# Patient Record
Sex: FEMALE | Race: WHITE | NOT HISPANIC OR LATINO | URBAN - METROPOLITAN AREA
[De-identification: names, ages, dates, MRNs, and addresses within clinical notes are randomized per-mention and may not be internally consistent; named-entity substitution may affect disease eponyms.]

---

## 2018-10-03 PROBLEM — Z00.00 ENCOUNTER FOR PREVENTIVE HEALTH EXAMINATION: Status: ACTIVE | Noted: 2018-10-03

## 2019-07-01 ENCOUNTER — OUTPATIENT (OUTPATIENT)
Dept: OUTPATIENT SERVICES | Facility: HOSPITAL | Age: 79
LOS: 1 days | End: 2019-07-01

## 2020-07-01 ENCOUNTER — RESULT REVIEW (OUTPATIENT)
Age: 80
End: 2020-07-01

## 2020-07-01 ENCOUNTER — APPOINTMENT (OUTPATIENT)
Dept: ULTRASOUND IMAGING | Facility: CLINIC | Age: 80
End: 2020-07-01
Payer: MEDICARE

## 2020-07-01 PROCEDURE — 76700 US EXAM ABDOM COMPLETE: CPT

## 2020-07-23 ENCOUNTER — OUTPATIENT (OUTPATIENT)
Dept: OUTPATIENT SERVICES | Facility: HOSPITAL | Age: 80
LOS: 1 days | End: 2020-07-23

## 2021-01-20 ENCOUNTER — APPOINTMENT (OUTPATIENT)
Dept: RADIOLOGY | Facility: CLINIC | Age: 81
End: 2021-01-20
Payer: MEDICARE

## 2021-01-20 PROCEDURE — 71046 X-RAY EXAM CHEST 2 VIEWS: CPT

## 2021-02-05 ENCOUNTER — APPOINTMENT (OUTPATIENT)
Dept: CT IMAGING | Facility: CLINIC | Age: 81
End: 2021-02-05
Payer: MEDICARE

## 2021-02-05 PROCEDURE — 71250 CT THORAX DX C-: CPT | Mod: MH

## 2021-05-07 ENCOUNTER — INPATIENT (INPATIENT)
Facility: HOSPITAL | Age: 81
LOS: 4 days | Discharge: HOME | End: 2021-05-12
Attending: INTERNAL MEDICINE | Admitting: STUDENT IN AN ORGANIZED HEALTH CARE EDUCATION/TRAINING PROGRAM
Payer: MEDICARE

## 2021-05-07 ENCOUNTER — EMERGENCY (EMERGENCY)
Facility: HOSPITAL | Age: 81
LOS: 1 days | End: 2021-05-07
Admitting: EMERGENCY MEDICINE
Payer: MEDICARE

## 2021-05-07 ENCOUNTER — OUTPATIENT (OUTPATIENT)
Dept: OUTPATIENT SERVICES | Facility: HOSPITAL | Age: 81
LOS: 1 days | End: 2021-05-07

## 2021-05-07 PROCEDURE — 71045 X-RAY EXAM CHEST 1 VIEW: CPT | Mod: 26

## 2021-05-07 PROCEDURE — 93010 ELECTROCARDIOGRAM REPORT: CPT

## 2021-05-07 PROCEDURE — 99285 EMERGENCY DEPT VISIT HI MDM: CPT | Mod: CS

## 2021-05-07 PROCEDURE — 99284 EMERGENCY DEPT VISIT MOD MDM: CPT | Mod: CS

## 2021-05-07 PROCEDURE — 74177 CT ABD & PELVIS W/CONTRAST: CPT | Mod: 26

## 2021-05-08 ENCOUNTER — OUTPATIENT (OUTPATIENT)
Dept: OUTPATIENT SERVICES | Facility: HOSPITAL | Age: 81
LOS: 1 days | End: 2021-05-08

## 2021-05-08 PROCEDURE — 93010 ELECTROCARDIOGRAM REPORT: CPT

## 2021-05-09 ENCOUNTER — OUTPATIENT (OUTPATIENT)
Dept: OUTPATIENT SERVICES | Facility: HOSPITAL | Age: 81
LOS: 1 days | End: 2021-05-09

## 2021-05-10 ENCOUNTER — OUTPATIENT (OUTPATIENT)
Dept: OUTPATIENT SERVICES | Facility: HOSPITAL | Age: 81
LOS: 1 days | End: 2021-05-10

## 2021-05-10 PROCEDURE — 71250 CT THORAX DX C-: CPT | Mod: 26

## 2021-05-11 ENCOUNTER — OUTPATIENT (OUTPATIENT)
Dept: OUTPATIENT SERVICES | Facility: HOSPITAL | Age: 81
LOS: 1 days | End: 2021-05-11

## 2021-05-11 PROCEDURE — 93970 EXTREMITY STUDY: CPT | Mod: 26

## 2021-05-11 PROCEDURE — 71275 CT ANGIOGRAPHY CHEST: CPT | Mod: 26

## 2021-05-12 ENCOUNTER — OUTPATIENT (OUTPATIENT)
Dept: OUTPATIENT SERVICES | Facility: HOSPITAL | Age: 81
LOS: 1 days | End: 2021-05-12

## 2021-05-13 ENCOUNTER — OUTPATIENT (OUTPATIENT)
Dept: OUTPATIENT SERVICES | Facility: HOSPITAL | Age: 81
LOS: 1 days | End: 2021-05-13

## 2021-05-13 ENCOUNTER — INPATIENT (INPATIENT)
Facility: HOSPITAL | Age: 81
LOS: 4 days | Discharge: HOME | End: 2021-05-18
Payer: MEDICARE

## 2021-05-13 PROCEDURE — 71045 X-RAY EXAM CHEST 1 VIEW: CPT | Mod: 26

## 2021-05-13 PROCEDURE — 99285 EMERGENCY DEPT VISIT HI MDM: CPT | Mod: CS

## 2021-05-13 PROCEDURE — 93010 ELECTROCARDIOGRAM REPORT: CPT

## 2021-05-13 PROCEDURE — 71275 CT ANGIOGRAPHY CHEST: CPT | Mod: 26

## 2021-05-14 ENCOUNTER — OUTPATIENT (OUTPATIENT)
Dept: OUTPATIENT SERVICES | Facility: HOSPITAL | Age: 81
LOS: 1 days | End: 2021-05-14

## 2021-05-15 ENCOUNTER — OUTPATIENT (OUTPATIENT)
Dept: OUTPATIENT SERVICES | Facility: HOSPITAL | Age: 81
LOS: 1 days | End: 2021-05-15

## 2021-05-15 PROCEDURE — 71045 X-RAY EXAM CHEST 1 VIEW: CPT | Mod: 26

## 2021-05-16 ENCOUNTER — OUTPATIENT (OUTPATIENT)
Dept: OUTPATIENT SERVICES | Facility: HOSPITAL | Age: 81
LOS: 1 days | End: 2021-05-16

## 2021-05-17 ENCOUNTER — OUTPATIENT (OUTPATIENT)
Dept: OUTPATIENT SERVICES | Facility: HOSPITAL | Age: 81
LOS: 1 days | End: 2021-05-17

## 2021-05-17 PROCEDURE — 71046 X-RAY EXAM CHEST 2 VIEWS: CPT | Mod: 26

## 2021-05-18 ENCOUNTER — OUTPATIENT (OUTPATIENT)
Dept: OUTPATIENT SERVICES | Facility: HOSPITAL | Age: 81
LOS: 1 days | End: 2021-05-18

## 2021-08-10 ENCOUNTER — APPOINTMENT (OUTPATIENT)
Dept: MRI IMAGING | Facility: CLINIC | Age: 81
End: 2021-08-10
Payer: MEDICARE

## 2021-08-10 ENCOUNTER — APPOINTMENT (OUTPATIENT)
Dept: MRI IMAGING | Facility: CLINIC | Age: 81
End: 2021-08-10

## 2021-08-10 PROCEDURE — 70544 MR ANGIOGRAPHY HEAD W/O DYE: CPT | Mod: MH,59

## 2021-08-10 PROCEDURE — 70551 MRI BRAIN STEM W/O DYE: CPT | Mod: MH

## 2021-10-04 ENCOUNTER — EMERGENCY (EMERGENCY)
Facility: HOSPITAL | Age: 81
LOS: 1 days | End: 2021-10-04
Admitting: EMERGENCY MEDICINE
Payer: MEDICARE

## 2021-10-04 PROCEDURE — 99284 EMERGENCY DEPT VISIT MOD MDM: CPT | Mod: 25

## 2021-10-04 PROCEDURE — 99291 CRITICAL CARE FIRST HOUR: CPT | Mod: 25

## 2021-10-04 PROCEDURE — 30903 CONTROL OF NOSEBLEED: CPT

## 2021-10-14 ENCOUNTER — OUTPATIENT (OUTPATIENT)
Dept: OUTPATIENT SERVICES | Facility: HOSPITAL | Age: 81
LOS: 1 days | End: 2021-10-14

## 2021-10-15 ENCOUNTER — OUTPATIENT (OUTPATIENT)
Dept: OUTPATIENT SERVICES | Facility: HOSPITAL | Age: 81
LOS: 1 days | End: 2021-10-15

## 2021-10-19 ENCOUNTER — APPOINTMENT (OUTPATIENT)
Dept: RADIOLOGY | Facility: CLINIC | Age: 81
End: 2021-10-19
Payer: MEDICARE

## 2021-10-19 PROCEDURE — 71046 X-RAY EXAM CHEST 2 VIEWS: CPT

## 2022-03-03 ENCOUNTER — APPOINTMENT (OUTPATIENT)
Dept: CT IMAGING | Facility: CLINIC | Age: 82
End: 2022-03-03
Payer: MEDICARE

## 2022-03-03 ENCOUNTER — RESULT REVIEW (OUTPATIENT)
Age: 82
End: 2022-03-03

## 2022-03-03 PROCEDURE — 74176 CT ABD & PELVIS W/O CONTRAST: CPT | Mod: MH

## 2022-06-30 ENCOUNTER — APPOINTMENT (OUTPATIENT)
Dept: VASCULAR SURGERY | Facility: CLINIC | Age: 82
End: 2022-06-30

## 2022-06-30 VITALS
SYSTOLIC BLOOD PRESSURE: 131 MMHG | BODY MASS INDEX: 24.99 KG/M2 | TEMPERATURE: 96.8 F | DIASTOLIC BLOOD PRESSURE: 78 MMHG | HEART RATE: 75 BPM | HEIGHT: 65 IN | WEIGHT: 150 LBS

## 2022-06-30 PROCEDURE — 93926 LOWER EXTREMITY STUDY: CPT

## 2022-06-30 PROCEDURE — 99214 OFFICE O/P EST MOD 30 MIN: CPT

## 2022-06-30 RX ORDER — ATORVASTATIN CALCIUM 40 MG/1
40 TABLET, FILM COATED ORAL
Qty: 90 | Refills: 0 | Status: ACTIVE | COMMUNITY
Start: 2022-04-20

## 2022-06-30 RX ORDER — ANAGRELIDE HYDROCHLORIDE 0.5 MG/1
0.5 CAPSULE ORAL
Qty: 180 | Refills: 0 | Status: ACTIVE | COMMUNITY
Start: 2022-03-31

## 2022-06-30 RX ORDER — CLOPIDOGREL BISULFATE 75 MG/1
75 TABLET, FILM COATED ORAL
Qty: 90 | Refills: 0 | Status: ACTIVE | COMMUNITY
Start: 2022-03-07

## 2022-06-30 RX ORDER — METOPROLOL SUCCINATE 50 MG/1
50 TABLET, EXTENDED RELEASE ORAL
Qty: 90 | Refills: 0 | Status: ACTIVE | COMMUNITY
Start: 2022-01-28

## 2022-06-30 RX ORDER — HYDROCHLOROTHIAZIDE 12.5 MG/1
12.5 TABLET ORAL
Qty: 90 | Refills: 0 | Status: ACTIVE | COMMUNITY
Start: 2022-05-10

## 2022-06-30 NOTE — ASSESSMENT
[FreeTextEntry1] : 82 yo s/p right ilio-profunda with femo-above knee popliteal bypass\par \par doing well\par \par continue plavix\par \par RTC 6 month with arterial duplex

## 2022-06-30 NOTE — HISTORY OF PRESENT ILLNESS
[FreeTextEntry1] : pt is well known to me 82 yo female with PAD who underwent right ilio-profunda and jump femoro-above knee popliteal bypass in summer 2019. Her PMH is significant for COPD. She in on home O2 currently.\par \par Denies any leg pain, claudication. No rest pain.\par \par Doing well.\par \par

## 2022-06-30 NOTE — PHYSICAL EXAM
[2+] : left 2+ [Ankle Swelling (On Exam)] : not present [Varicose Veins Of Lower Extremities] : present [Varicose Veins Of The Right Leg] : of the right leg [] : not present [Abdomen Tenderness] : ~T ~M No abdominal tenderness [No Rash or Lesion] : No rash or lesion [Alert] : alert [Oriented to Person] : oriented to person [Oriented to Place] : oriented to place [de-identified] : breathing comfortably on room air  [de-identified] : AGUSTINR

## 2023-01-12 ENCOUNTER — APPOINTMENT (OUTPATIENT)
Dept: VASCULAR SURGERY | Facility: CLINIC | Age: 83
End: 2023-01-12
Payer: MEDICARE

## 2023-01-12 VITALS
SYSTOLIC BLOOD PRESSURE: 149 MMHG | WEIGHT: 150 LBS | HEART RATE: 56 BPM | BODY MASS INDEX: 24.99 KG/M2 | HEIGHT: 65 IN | DIASTOLIC BLOOD PRESSURE: 83 MMHG | TEMPERATURE: 98.2 F

## 2023-01-12 PROCEDURE — 99213 OFFICE O/P EST LOW 20 MIN: CPT

## 2023-01-12 PROCEDURE — 93926 LOWER EXTREMITY STUDY: CPT

## 2023-01-12 NOTE — DATA REVIEWED
[FreeTextEntry1] : Arterial duplex examination 1/12/2023 reveals that right iliac to femoral bypass is patent.  Right jump femoral to above-knee popliteal bypass is patent.  There is short segment occlusion in popliteal artery with single-vessel runoff through anterior tibialis.

## 2023-01-12 NOTE — HISTORY OF PRESENT ILLNESS
[FreeTextEntry1] : pt is well known to me 82 yo female with PAD who underwent right ilio-profunda and jump femoro-above knee popliteal bypass in summer 2018. Her PMH is significant for COPD. She in on home O2 currently.\par \par Denies any leg pain, claudication. No rest pain.\par \par Presented to for 6 months routine surveillance.  Denies any claudication.  Patient states that she has lost significant weight and underwent work-up.  This included CT and PET scan and was negative.\par \par

## 2023-01-12 NOTE — PHYSICAL EXAM
[0] : right 0 [2+] : left 2+ [Ankle Swelling (On Exam)] : not present [Varicose Veins Of Lower Extremities] : present [Varicose Veins Of The Right Leg] : of the right leg [] : not present [Abdomen Tenderness] : ~T ~M No abdominal tenderness [No Rash or Lesion] : No rash or lesion [Alert] : alert [Oriented to Person] : oriented to person [Oriented to Place] : oriented to place [de-identified] : Alert and oriented no acute distress [de-identified] : breathing comfortably on room air  [de-identified] : AGUSTINR

## 2023-01-12 NOTE — ASSESSMENT
[FreeTextEntry1] : 80 yo s/p right ilio-profunda with femo-above knee popliteal bypass\par \par There is a short segment occlusion in popliteal artery that developed over the last 6-month.  Question etiology embolic versus thrombotic.\par \par Given that the femoral to popliteal bypass has decreased velocities to 45 I think it is reasonable to perform right lower extremity angiogram to revascularize right popliteal artery.  We will perform this in Cath Lab with minimal sedation given that the patient has significant COPD.\par \par Plan\par \par Right lower extremity angiogram possible pedal approach for revascularization of right popliteal artery

## 2023-01-24 ENCOUNTER — APPOINTMENT (OUTPATIENT)
Dept: CT IMAGING | Facility: CLINIC | Age: 83
End: 2023-01-24
Payer: MEDICARE

## 2023-01-24 ENCOUNTER — RESULT REVIEW (OUTPATIENT)
Age: 83
End: 2023-01-24

## 2023-01-24 PROCEDURE — 75635 CT ANGIO ABDOMINAL ARTERIES: CPT | Mod: MH

## 2023-01-30 ENCOUNTER — APPOINTMENT (OUTPATIENT)
Dept: VASCULAR SURGERY | Facility: HOSPITAL | Age: 83
End: 2023-01-30

## 2023-03-08 ENCOUNTER — APPOINTMENT (OUTPATIENT)
Dept: OPHTHALMOLOGY | Facility: CLINIC | Age: 83
End: 2023-03-08
Payer: MEDICARE

## 2023-03-08 ENCOUNTER — NON-APPOINTMENT (OUTPATIENT)
Age: 83
End: 2023-03-08

## 2023-03-08 PROCEDURE — 92004 COMPRE OPH EXAM NEW PT 1/>: CPT

## 2023-03-08 PROCEDURE — 92134 CPTRZ OPH DX IMG PST SGM RTA: CPT

## 2023-04-06 ENCOUNTER — APPOINTMENT (OUTPATIENT)
Dept: VASCULAR SURGERY | Facility: CLINIC | Age: 83
End: 2023-04-06
Payer: MEDICARE

## 2023-04-06 VITALS
DIASTOLIC BLOOD PRESSURE: 79 MMHG | HEART RATE: 56 BPM | BODY MASS INDEX: 22.33 KG/M2 | HEIGHT: 65 IN | TEMPERATURE: 97.6 F | SYSTOLIC BLOOD PRESSURE: 161 MMHG | WEIGHT: 134 LBS

## 2023-04-06 PROCEDURE — 99213 OFFICE O/P EST LOW 20 MIN: CPT

## 2023-04-06 PROCEDURE — 93926 LOWER EXTREMITY STUDY: CPT

## 2023-04-07 ENCOUNTER — TRANSCRIPTION ENCOUNTER (OUTPATIENT)
Age: 83
End: 2023-04-07

## 2023-04-19 NOTE — HISTORY OF PRESENT ILLNESS
[FreeTextEntry1] : pt is well known to me 80 yo female with PAD who underwent right ilio-profunda and jump femoro-above knee popliteal bypass in summer 2018. Her PMH is significant for COPD. She in on home O2 currently.\par \par Denies any leg pain, claudication. No rest pain.\par \par Presented to for 6 months routine surveillance.  Denies any claudication.  Patient states that she has lost significant weight and underwent work-up.  This included CT and PET scan and was negative.\par \par \par 4/6/23: Patient presented today for follow-up.  She continues to lose weight.  She was found to have a lymph node in her mediastinum not amenable to biopsy due to proximity to aorta.  Denies any claudication.  Denies any pain at rest.\par

## 2023-04-19 NOTE — DATA REVIEWED
[FreeTextEntry1] : Arterial duplex examination 1/12/2023 reveals that right iliac to femoral bypass is patent.  Right jump femoral to above-knee popliteal bypass is patent.  There is short segment occlusion in popliteal artery with single-vessel runoff through anterior tibialis.\par \par 4/6/2023: Duplex examination today revealed that right iliac to femoral bypass patent.  Right femoral to popliteal bypass is patent with no velocities in 30s stable compared to last time.  There was single-vessel runoff via anterior tibialis

## 2023-04-19 NOTE — ASSESSMENT
[FreeTextEntry1] : 80 yo s/p right ilio-profunda with femo-above knee popliteal bypass\par \par There is a short segment occlusion in popliteal artery that developed over the last 6-month.  Question etiology embolic versus thrombotic.\par \par Patient was found not to be a candidate for surgical revascularization due to her complex medical history including recent weight loss, severe COPD.\par \par I discussed the case with the patient primary care.  Patient also has history of intracranial bleeding.  We will continue antiplatelet therapy and low-dose anticoagulation with Eliquis 2.5 twice daily.

## 2023-04-19 NOTE — PHYSICAL EXAM
[2+] : left 2+ [Varicose Veins Of Lower Extremities] : present [Varicose Veins Of The Right Leg] : of the right leg [No Rash or Lesion] : No rash or lesion [Alert] : alert [Oriented to Person] : oriented to person [Oriented to Place] : oriented to place [Ankle Swelling (On Exam)] : not present [] : not present [Abdomen Tenderness] : ~T ~M No abdominal tenderness [de-identified] : Alert and oriented no acute distress [de-identified] : breathing comfortably on room air  [de-identified] : AGUSTINR [FreeTextEntry1] : Bilateral feet bluish demarcation.  Right lower extremity varicosities.\par Diminished pedal pulses.  Doppler signals present in right DP and PT

## 2023-05-11 ENCOUNTER — APPOINTMENT (OUTPATIENT)
Dept: OPHTHALMOLOGY | Facility: CLINIC | Age: 83
End: 2023-05-11
Payer: MEDICARE

## 2023-05-11 ENCOUNTER — NON-APPOINTMENT (OUTPATIENT)
Age: 83
End: 2023-05-11

## 2023-05-11 PROCEDURE — 92083 EXTENDED VISUAL FIELD XM: CPT

## 2023-05-11 PROCEDURE — 92133 CPTRZD OPH DX IMG PST SGM ON: CPT

## 2023-09-07 ENCOUNTER — NON-APPOINTMENT (OUTPATIENT)
Age: 83
End: 2023-09-07

## 2023-09-07 ENCOUNTER — APPOINTMENT (OUTPATIENT)
Dept: OPHTHALMOLOGY | Facility: CLINIC | Age: 83
End: 2023-09-07
Payer: MEDICARE

## 2023-09-07 PROCEDURE — 76514 ECHO EXAM OF EYE THICKNESS: CPT

## 2023-09-07 PROCEDURE — 99213 OFFICE O/P EST LOW 20 MIN: CPT

## 2023-10-12 ENCOUNTER — APPOINTMENT (OUTPATIENT)
Dept: VASCULAR SURGERY | Facility: CLINIC | Age: 83
End: 2023-10-12
Payer: MEDICARE

## 2023-10-12 VITALS
TEMPERATURE: 98.2 F | SYSTOLIC BLOOD PRESSURE: 147 MMHG | WEIGHT: 235 LBS | DIASTOLIC BLOOD PRESSURE: 78 MMHG | HEIGHT: 65 IN | BODY MASS INDEX: 39.15 KG/M2 | HEART RATE: 60 BPM

## 2023-10-12 DIAGNOSIS — Z80.9 FAMILY HISTORY OF MALIGNANT NEOPLASM, UNSPECIFIED: ICD-10-CM

## 2023-10-12 DIAGNOSIS — Z78.9 OTHER SPECIFIED HEALTH STATUS: ICD-10-CM

## 2023-10-12 DIAGNOSIS — Z86.79 PERSONAL HISTORY OF OTHER DISEASES OF THE CIRCULATORY SYSTEM: ICD-10-CM

## 2023-10-12 PROCEDURE — 99214 OFFICE O/P EST MOD 30 MIN: CPT

## 2023-10-12 PROCEDURE — 93926 LOWER EXTREMITY STUDY: CPT

## 2023-10-12 RX ORDER — FUROSEMIDE 80 MG/1
TABLET ORAL
Refills: 0 | Status: ACTIVE | COMMUNITY

## 2023-11-30 ENCOUNTER — APPOINTMENT (OUTPATIENT)
Dept: RADIOLOGY | Facility: CLINIC | Age: 83
End: 2023-11-30
Payer: MEDICARE

## 2023-11-30 PROCEDURE — 71046 X-RAY EXAM CHEST 2 VIEWS: CPT

## 2024-01-18 ENCOUNTER — APPOINTMENT (OUTPATIENT)
Dept: VASCULAR SURGERY | Facility: CLINIC | Age: 84
End: 2024-01-18
Payer: MEDICARE

## 2024-01-18 VITALS
TEMPERATURE: 97.5 F | WEIGHT: 126 LBS | HEART RATE: 58 BPM | BODY MASS INDEX: 20.99 KG/M2 | DIASTOLIC BLOOD PRESSURE: 68 MMHG | HEIGHT: 65 IN | SYSTOLIC BLOOD PRESSURE: 110 MMHG

## 2024-01-18 PROCEDURE — 93926 LOWER EXTREMITY STUDY: CPT

## 2024-01-18 PROCEDURE — 99214 OFFICE O/P EST MOD 30 MIN: CPT

## 2024-01-23 NOTE — PHYSICAL EXAM
[2+] : left 2+ [Ankle Swelling (On Exam)] : not present [Varicose Veins Of Lower Extremities] : present [Varicose Veins Of The Right Leg] : of the right leg [] : not present [Abdomen Tenderness] : ~T ~M No abdominal tenderness [No Rash or Lesion] : No rash or lesion [Alert] : alert [Oriented to Person] : oriented to person [Oriented to Place] : oriented to place [de-identified] : Alert and oriented no acute distress [de-identified] : breathing comfortably on room air  [de-identified] : AGUSTINR [FreeTextEntry1] : Bilateral feet bluish demarcation.  Right lower extremity varicosities.\par  Diminished pedal pulses.  Doppler signals present in right DP and PT

## 2024-01-23 NOTE — HISTORY OF PRESENT ILLNESS
[FreeTextEntry1] : pt is well known to me 84 yo female with PAD who underwent right ilio-profunda and jump femoro-above knee popliteal bypass in summer 2018. Her PMH is significant for COPD. She in on home O2 currently.  Denies any leg pain, claudication. No rest pain.  Presented to for 6 months routine surveillance.  Denies any claudication.  Patient states that she has lost significant weight and underwent work-up.  This included CT and PET scan and was negative.   4/6/23: Patient presented today for follow-up.  She continues to lose weight.  She was found to have a lymph node in her mediastinum not amenable to biopsy due to proximity to aorta.  Denies any claudication.  Denies any pain at rest.  10/19/23: Patient presented for follow-up.  Doing well.  No recent change in health.  Denies claudication.  Complains of varicose vein on the right side with pain.  Denies any pain at rest.    1/18/24: Patient presented for follow-up.  Over [place] and new year she was hospitalized with respiratory issues and also was found to have A-fib and was started on anticoagulation.  No lower extremity complaint at this point.

## 2024-01-23 NOTE — DATA REVIEWED
[FreeTextEntry1] : Arterial duplex examination 1/12/2023 reveals that right iliac to femoral bypass is patent.  Right jump femoral to above-knee popliteal bypass is patent.  There is short segment occlusion in popliteal artery with single-vessel runoff through anterior tibialis.  4/6/2023: Duplex examination today revealed that right iliac to femoral bypass patent.  Right femoral to popliteal bypass is patent with no velocities in 30s stable compared to last time.  There was single-vessel runoff via anterior tibialis  1/18/2024 duplex examination is a stable with patent right femoropopliteal bypass and occluded popliteal artery.  The velocities and femoropopliteal bypass status is stable  7/12/23: Duplex exam right lower extremity showed similar resolved with right iliac to femoral bypass patent and right femoral to popliteal bypass patent with velocities in 30s.  Popliteal artery occluded.  There is a collateral that feed anterior tibialis the only runoff

## 2024-01-23 NOTE — ASSESSMENT
[FreeTextEntry1] : 82 yo s/p right ilio-profunda with femo-above knee popliteal bypass There is a short segment occlusion in popliteal artery Femoropopliteal bypass still patent  Patient is currently on Eliquis 2.5 mg twice daily  Will continue follow-up routinely in 3 months

## 2024-03-07 ENCOUNTER — APPOINTMENT (OUTPATIENT)
Dept: OPHTHALMOLOGY | Facility: CLINIC | Age: 84
End: 2024-03-07
Payer: MEDICARE

## 2024-03-07 ENCOUNTER — NON-APPOINTMENT (OUTPATIENT)
Age: 84
End: 2024-03-07

## 2024-03-07 PROCEDURE — 92134 CPTRZ OPH DX IMG PST SGM RTA: CPT

## 2024-03-07 PROCEDURE — 92014 COMPRE OPH EXAM EST PT 1/>: CPT

## 2024-04-18 ENCOUNTER — APPOINTMENT (OUTPATIENT)
Dept: VASCULAR SURGERY | Facility: CLINIC | Age: 84
End: 2024-04-18
Payer: MEDICARE

## 2024-04-18 VITALS
SYSTOLIC BLOOD PRESSURE: 130 MMHG | WEIGHT: 126 LBS | DIASTOLIC BLOOD PRESSURE: 78 MMHG | HEART RATE: 57 BPM | BODY MASS INDEX: 20.99 KG/M2 | TEMPERATURE: 97.5 F | HEIGHT: 65 IN

## 2024-04-18 DIAGNOSIS — I77.9 DISORDER OF ARTERIES AND ARTERIOLES, UNSPECIFIED: ICD-10-CM

## 2024-04-18 DIAGNOSIS — I87.2 VENOUS INSUFFICIENCY (CHRONIC) (PERIPHERAL): ICD-10-CM

## 2024-04-18 DIAGNOSIS — Z95.828 PRESENCE OF OTHER VASCULAR IMPLANTS AND GRAFTS: ICD-10-CM

## 2024-04-18 PROCEDURE — 99213 OFFICE O/P EST LOW 20 MIN: CPT

## 2024-04-18 PROCEDURE — 93926 LOWER EXTREMITY STUDY: CPT

## 2024-04-18 NOTE — ASSESSMENT
[FreeTextEntry1] : 84 yo s/p right ilio-profunda with femo-above knee popliteal bypass There is a short segment occlusion in popliteal artery Femoropopliteal bypass still patent  Patient is currently on Eliquis 2.5 mg twice daily  Will continue follow-up routinely in 3 months  Patient right lower extremity varicose veins and hyperpigmentation worsening.  Will perform initial duplex examination next visit and potentially stab phlebectomy in future

## 2024-04-18 NOTE — PHYSICAL EXAM
[2+] : left 2+ [Ankle Swelling (On Exam)] : not present [Varicose Veins Of Lower Extremities] : bilaterally [] : not present [Abdomen Tenderness] : ~T ~M No abdominal tenderness [No Rash or Lesion] : No rash or lesion [Alert] : alert [Oriented to Person] : oriented to person [Oriented to Place] : oriented to place [de-identified] : Alert and oriented no acute distress [de-identified] : breathing comfortably on room air  [de-identified] : AGUSTINR [FreeTextEntry1] : Bilateral feet bluish demarcation.  Right lower extremity varicosities. Diminished pedal pulses.  Doppler signals present in right DP and PT  Bilateral lower extremity varicosities present no venous ulcer.

## 2024-04-18 NOTE — HISTORY OF PRESENT ILLNESS
[FreeTextEntry1] : pt is well known to me 84 yo female with PAD who underwent right ilio-profunda and jump femoro-above knee popliteal bypass in summer 2018. Her PMH is significant for COPD. She in on home O2 currently.  Denies any leg pain, claudication. No rest pain.  Presented to for 6 months routine surveillance.  Denies any claudication.  Patient states that she has lost significant weight and underwent work-up.  This included CT and PET scan and was negative.   4/6/23: Patient presented today for follow-up.  She continues to lose weight.  She was found to have a lymph node in her mediastinum not amenable to biopsy due to proximity to aorta.  Denies any claudication.  Denies any pain at rest.  10/19/23: Patient presented for follow-up.  Doing well.  No recent change in health.  Denies claudication.  Complains of varicose vein on the right side with pain.  Denies any pain at rest.    1/18/24: Patient presented for follow-up.  Over [place] and new year she was hospitalized with respiratory issues and also was found to have A-fib and was started on anticoagulation.  No lower extremity complaint at this point.    4/18/24: Patient present for follow-up.  Doing very well.  No recent hospitalization.  No claudication.

## 2024-05-16 ENCOUNTER — INPATIENT (INPATIENT)
Facility: HOSPITAL | Age: 84
LOS: 11 days | Discharge: NURSING, REHAB OR LONG-TERM CARE | DRG: 303 | End: 2024-05-28
Attending: SURGERY | Admitting: SURGERY
Payer: MEDICARE

## 2024-05-16 VITALS
RESPIRATION RATE: 20 BRPM | DIASTOLIC BLOOD PRESSURE: 90 MMHG | HEART RATE: 66 BPM | OXYGEN SATURATION: 92 % | TEMPERATURE: 98 F | SYSTOLIC BLOOD PRESSURE: 169 MMHG

## 2024-05-16 DIAGNOSIS — I99.8 OTHER DISORDER OF CIRCULATORY SYSTEM: ICD-10-CM

## 2024-05-16 LAB
ALBUMIN SERPL ELPH-MCNC: 3.5 G/DL — SIGNIFICANT CHANGE UP (ref 3.3–5)
ALP SERPL-CCNC: 93 U/L — SIGNIFICANT CHANGE UP (ref 40–120)
ALT FLD-CCNC: 9 U/L — LOW (ref 10–45)
ANION GAP SERPL CALC-SCNC: 14 MMOL/L — SIGNIFICANT CHANGE UP (ref 5–17)
APTT BLD: 32.5 SEC — SIGNIFICANT CHANGE UP (ref 24.5–35.6)
AST SERPL-CCNC: 14 U/L — SIGNIFICANT CHANGE UP (ref 10–40)
BASOPHILS # BLD AUTO: 0.06 K/UL — SIGNIFICANT CHANGE UP (ref 0–0.2)
BASOPHILS NFR BLD AUTO: 0.6 % — SIGNIFICANT CHANGE UP (ref 0–2)
BILIRUB SERPL-MCNC: 0.7 MG/DL — SIGNIFICANT CHANGE UP (ref 0.2–1.2)
BLD GP AB SCN SERPL QL: NEGATIVE — SIGNIFICANT CHANGE UP
BUN SERPL-MCNC: 32 MG/DL — HIGH (ref 7–23)
CALCIUM SERPL-MCNC: 8.8 MG/DL — SIGNIFICANT CHANGE UP (ref 8.4–10.5)
CHLORIDE SERPL-SCNC: 104 MMOL/L — SIGNIFICANT CHANGE UP (ref 96–108)
CO2 SERPL-SCNC: 22 MMOL/L — SIGNIFICANT CHANGE UP (ref 22–31)
CREAT SERPL-MCNC: 1.34 MG/DL — HIGH (ref 0.5–1.3)
EGFR: 40 ML/MIN/1.73M2 — LOW
EOSINOPHIL # BLD AUTO: 0.12 K/UL — SIGNIFICANT CHANGE UP (ref 0–0.5)
EOSINOPHIL NFR BLD AUTO: 1.1 % — SIGNIFICANT CHANGE UP (ref 0–6)
GLUCOSE SERPL-MCNC: 97 MG/DL — SIGNIFICANT CHANGE UP (ref 70–99)
HCT VFR BLD CALC: 47.2 % — HIGH (ref 34.5–45)
HGB BLD-MCNC: 14.3 G/DL — SIGNIFICANT CHANGE UP (ref 11.5–15.5)
IMM GRANULOCYTES NFR BLD AUTO: 1.1 % — HIGH (ref 0–0.9)
INR BLD: 1.4 RATIO — HIGH (ref 0.85–1.18)
LYMPHOCYTES # BLD AUTO: 0.8 K/UL — LOW (ref 1–3.3)
LYMPHOCYTES # BLD AUTO: 7.6 % — LOW (ref 13–44)
MCHC RBC-ENTMCNC: 23.1 PG — LOW (ref 27–34)
MCHC RBC-ENTMCNC: 30.3 GM/DL — LOW (ref 32–36)
MCV RBC AUTO: 76.3 FL — LOW (ref 80–100)
MONOCYTES # BLD AUTO: 0.37 K/UL — SIGNIFICANT CHANGE UP (ref 0–0.9)
MONOCYTES NFR BLD AUTO: 3.5 % — SIGNIFICANT CHANGE UP (ref 2–14)
NEUTROPHILS # BLD AUTO: 9.11 K/UL — HIGH (ref 1.8–7.4)
NEUTROPHILS NFR BLD AUTO: 86.1 % — HIGH (ref 43–77)
NRBC # BLD: 0 /100 WBCS — SIGNIFICANT CHANGE UP (ref 0–0)
NT-PROBNP SERPL-SCNC: 1669 PG/ML — HIGH (ref 0–300)
PLATELET # BLD AUTO: 232 K/UL — SIGNIFICANT CHANGE UP (ref 150–400)
POTASSIUM SERPL-MCNC: 3.7 MMOL/L — SIGNIFICANT CHANGE UP (ref 3.5–5.3)
POTASSIUM SERPL-SCNC: 3.7 MMOL/L — SIGNIFICANT CHANGE UP (ref 3.5–5.3)
PROT SERPL-MCNC: 8.5 G/DL — HIGH (ref 6–8.3)
PROTHROM AB SERPL-ACNC: 14.6 SEC — HIGH (ref 9.5–13)
RBC # BLD: 6.19 M/UL — HIGH (ref 3.8–5.2)
RBC # FLD: 21.1 % — HIGH (ref 10.3–14.5)
RH IG SCN BLD-IMP: POSITIVE — SIGNIFICANT CHANGE UP
SODIUM SERPL-SCNC: 140 MMOL/L — SIGNIFICANT CHANGE UP (ref 135–145)
TROPONIN T, HIGH SENSITIVITY RESULT: 34 NG/L — SIGNIFICANT CHANGE UP (ref 0–51)
WBC # BLD: 10.58 K/UL — HIGH (ref 3.8–10.5)
WBC # FLD AUTO: 10.58 K/UL — HIGH (ref 3.8–10.5)

## 2024-05-16 PROCEDURE — 75635 CT ANGIO ABDOMINAL ARTERIES: CPT | Mod: 26,MC

## 2024-05-16 PROCEDURE — 99291 CRITICAL CARE FIRST HOUR: CPT | Mod: GC

## 2024-05-16 PROCEDURE — 99222 1ST HOSP IP/OBS MODERATE 55: CPT | Mod: FS

## 2024-05-16 RX ORDER — HYDROMORPHONE HYDROCHLORIDE 2 MG/ML
0.5 INJECTION INTRAMUSCULAR; INTRAVENOUS; SUBCUTANEOUS EVERY 4 HOURS
Refills: 0 | Status: DISCONTINUED | OUTPATIENT
Start: 2024-05-16 | End: 2024-05-18

## 2024-05-16 RX ORDER — TIOTROPIUM BROMIDE 18 UG/1
2 CAPSULE ORAL; RESPIRATORY (INHALATION) DAILY
Refills: 0 | Status: DISCONTINUED | OUTPATIENT
Start: 2024-05-16 | End: 2024-05-28

## 2024-05-16 RX ORDER — AMIODARONE HYDROCHLORIDE 400 MG/1
200 TABLET ORAL DAILY
Refills: 0 | Status: DISCONTINUED | OUTPATIENT
Start: 2024-05-16 | End: 2024-05-28

## 2024-05-16 RX ORDER — HEPARIN SODIUM 5000 [USP'U]/ML
5000 INJECTION INTRAVENOUS; SUBCUTANEOUS ONCE
Refills: 0 | Status: COMPLETED | OUTPATIENT
Start: 2024-05-16 | End: 2024-05-16

## 2024-05-16 RX ORDER — SODIUM CHLORIDE 9 MG/ML
1000 INJECTION, SOLUTION INTRAVENOUS
Refills: 0 | Status: DISCONTINUED | OUTPATIENT
Start: 2024-05-16 | End: 2024-05-18

## 2024-05-16 RX ORDER — ALBUTEROL 90 UG/1
1 AEROSOL, METERED ORAL
Refills: 0 | Status: DISCONTINUED | OUTPATIENT
Start: 2024-05-16 | End: 2024-05-18

## 2024-05-16 RX ORDER — ACETAMINOPHEN 500 MG
1000 TABLET ORAL EVERY 6 HOURS
Refills: 0 | Status: COMPLETED | OUTPATIENT
Start: 2024-05-16 | End: 2024-05-17

## 2024-05-16 RX ORDER — HEPARIN SODIUM 5000 [USP'U]/ML
5000 INJECTION INTRAVENOUS; SUBCUTANEOUS EVERY 6 HOURS
Refills: 0 | Status: DISCONTINUED | OUTPATIENT
Start: 2024-05-16 | End: 2024-05-18

## 2024-05-16 RX ORDER — CLOPIDOGREL BISULFATE 75 MG/1
75 TABLET, FILM COATED ORAL DAILY
Refills: 0 | Status: DISCONTINUED | OUTPATIENT
Start: 2024-05-16 | End: 2024-05-20

## 2024-05-16 RX ORDER — ANAGRELIDE HCL 0.5 MG
0.5 CAPSULE ORAL DAILY
Refills: 0 | Status: DISCONTINUED | OUTPATIENT
Start: 2024-05-16 | End: 2024-05-18

## 2024-05-16 RX ORDER — ATORVASTATIN CALCIUM 80 MG/1
40 TABLET, FILM COATED ORAL AT BEDTIME
Refills: 0 | Status: DISCONTINUED | OUTPATIENT
Start: 2024-05-16 | End: 2024-05-28

## 2024-05-16 RX ORDER — HYDROMORPHONE HYDROCHLORIDE 2 MG/ML
0.2 INJECTION INTRAMUSCULAR; INTRAVENOUS; SUBCUTANEOUS EVERY 4 HOURS
Refills: 0 | Status: DISCONTINUED | OUTPATIENT
Start: 2024-05-16 | End: 2024-05-18

## 2024-05-16 RX ORDER — AMLODIPINE BESYLATE 2.5 MG/1
5 TABLET ORAL DAILY
Refills: 0 | Status: DISCONTINUED | OUTPATIENT
Start: 2024-05-16 | End: 2024-05-28

## 2024-05-16 RX ORDER — HEPARIN SODIUM 5000 [USP'U]/ML
INJECTION INTRAVENOUS; SUBCUTANEOUS
Qty: 25000 | Refills: 0 | Status: DISCONTINUED | OUTPATIENT
Start: 2024-05-16 | End: 2024-05-18

## 2024-05-16 RX ORDER — HEPARIN SODIUM 5000 [USP'U]/ML
2500 INJECTION INTRAVENOUS; SUBCUTANEOUS EVERY 6 HOURS
Refills: 0 | Status: DISCONTINUED | OUTPATIENT
Start: 2024-05-16 | End: 2024-05-18

## 2024-05-16 RX ORDER — METOPROLOL TARTRATE 50 MG
25 TABLET ORAL DAILY
Refills: 0 | Status: DISCONTINUED | OUTPATIENT
Start: 2024-05-16 | End: 2024-05-28

## 2024-05-16 RX ADMIN — SODIUM CHLORIDE 75 MILLILITER(S): 9 INJECTION, SOLUTION INTRAVENOUS at 21:51

## 2024-05-16 RX ADMIN — HEPARIN SODIUM 1100 UNIT(S)/HR: 5000 INJECTION INTRAVENOUS; SUBCUTANEOUS at 19:33

## 2024-05-16 RX ADMIN — HEPARIN SODIUM 5000 UNIT(S): 5000 INJECTION INTRAVENOUS; SUBCUTANEOUS at 19:31

## 2024-05-16 NOTE — H&P ADULT - ATTENDING COMMENTS
82 yo female with complex PMH including severe PAD, COPD s/p right ileo-femoral and fem-pop in the past presented with thrombosis of right ileofemoral and fem pop graft  patient has severe rest pain and foot discoloration  after a long conversation with the patient and her daughter regarding risks and benefits of redo revascularization (ax-redo prufonda with jump bypass to the AT versus primary amputation) the patient and her family elected to undergo primary above knee amputation    all risks , benefits, and alternatives were discussed

## 2024-05-16 NOTE — H&P ADULT - HISTORY OF PRESENT ILLNESS
Patient is a 83y old  Female who presents with a chief complaint of R leg pain    HPI: 83F h/o CAD (1 stent placed), COPD, HTN, HLD, PAD s/p  right ilio-profunda and jump femoro-above knee popliteal bypass in summer 2018, brain aneurysm p/w 3d of RLE pain, numbness. States sxs started as b/l pain in soles of feet Monday night. Went to [facility] who dc'd with dx of plantar fasciitis. Today, R foot became numb and more painful. Also noted purple discoloration. Endorses rest pain, no wounds.         PAST MEDICAL & SURGICAL HISTORY:  CAD (coronary artery disease)      History of COPD      PVD (peripheral vascular disease)        FAMILY HISTORY:    [X] Family history not pertinent as reviewed with the patient and family    SOCIAL HISTORY:      ALLERGIES: No Known Allergies      ROS: 10-system review is otherwise negative except HPI above.      T(C): 36.9 (05-16-24 @ 17:20), Max: 36.9 (05-16-24 @ 17:20)  HR: 66 (05-16-24 @ 17:20) (66 - 66)  BP: 169/90 (05-16-24 @ 17:20) (169/90 - 169/90)  RR: 20 (05-16-24 @ 17:20) (20 - 20)  SpO2: 92% (05-16-24 @ 17:20) (92% - 92%)    PHYSICAL EXAM  GENERAL: NAD, lying in bed comfortably  CHEST: nonlabored, no increased WOB  ABDOMEN: Soft, Nontender, Nondistended.  EXTREMITIES: RLE discoloration. absent sensation, decreased motor. Rest pain. Nonpalpable pulses in fem, pop, PT, cooler than LLE.   NERVOUS SYSTEM:  Alert & Oriented X3      OBJECTIVE  No Known Allergies    I&O's Summary    I&O's Detail    LABS                        14.3   10.58 )-----------( 232      ( 16 May 2024 18:35 )             47.2     05-16    140  |  104  |  32<H>  ----------------------------<  97  3.7   |  22  |  1.34<H>    Ca    8.8      16 May 2024 18:35    TPro  8.5<H>  /  Alb  3.5  /  TBili  0.7  /  DBili  x   /  AST  14  /  ALT  9<L>  /  AlkPhos  93  05-16    PT/INR - ( 16 May 2024 18:35 )   PT: 14.6 sec;   INR: 1.40 ratio         PTT - ( 16 May 2024 18:35 )  PTT:32.5 sec  Urinalysis Basic - ( 16 May 2024 18:35 )    Color: x / Appearance: x / SG: x / pH: x  Gluc: 97 mg/dL / Ketone: x  / Bili: x / Urobili: x   Blood: x / Protein: x / Nitrite: x   Leuk Esterase: x / RBC: x / WBC x   Sq Epi: x / Non Sq Epi: x / Bacteria: x          IMAGING   Patient is a 83y old  Female who presents with a chief complaint of R leg pain    HPI: 83F h/o CAD (1 stent placed), COPD, HTN, HLD, PAD s/p  right ilio-profunda and jump femoro-above knee popliteal bypass in summer 2018, brain aneurysm p/w 3d of RLE pain, numbness. States sxs started as b/l pain in soles of feet Monday night. Went to [facility] who dc'd with dx of plantar fasciitis. Today, R foot became numb and more painful. Also noted purple discoloration. Endorses rest pain, no wounds.         PAST MEDICAL & SURGICAL HISTORY:  CAD (coronary artery disease)      History of COPD      PVD (peripheral vascular disease)        FAMILY HISTORY:    [X] Family history not pertinent as reviewed with the patient and family    SOCIAL HISTORY:      ALLERGIES: No Known Allergies      ROS: 10-system review is otherwise negative except HPI above.      T(C): 36.9 (05-16-24 @ 17:20), Max: 36.9 (05-16-24 @ 17:20)  HR: 66 (05-16-24 @ 17:20) (66 - 66)  BP: 169/90 (05-16-24 @ 17:20) (169/90 - 169/90)  RR: 20 (05-16-24 @ 17:20) (20 - 20)  SpO2: 92% (05-16-24 @ 17:20) (92% - 92%)    PHYSICAL EXAM  GENERAL: NAD, lying in bed comfortably  CHEST: nonlabored, no increased WOB  ABDOMEN: Soft, Nontender, Nondistended.  EXTREMITIES: RLE discoloration, motor intact, decreased sensation. Dopplerable triphasic femoral signal and monophasic popliteal on RLE.   NERVOUS SYSTEM:  Alert & Oriented X3      OBJECTIVE  No Known Allergies    I&O's Summary    I&O's Detail    LABS                        14.3   10.58 )-----------( 232      ( 16 May 2024 18:35 )             47.2     05-16    140  |  104  |  32<H>  ----------------------------<  97  3.7   |  22  |  1.34<H>    Ca    8.8      16 May 2024 18:35    TPro  8.5<H>  /  Alb  3.5  /  TBili  0.7  /  DBili  x   /  AST  14  /  ALT  9<L>  /  AlkPhos  93  05-16    PT/INR - ( 16 May 2024 18:35 )   PT: 14.6 sec;   INR: 1.40 ratio         PTT - ( 16 May 2024 18:35 )  PTT:32.5 sec  Urinalysis Basic - ( 16 May 2024 18:35 )    Color: x / Appearance: x / SG: x / pH: x  Gluc: 97 mg/dL / Ketone: x  / Bili: x / Urobili: x   Blood: x / Protein: x / Nitrite: x   Leuk Esterase: x / RBC: x / WBC x   Sq Epi: x / Non Sq Epi: x / Bacteria: x          IMAGING

## 2024-05-16 NOTE — ED ADULT NURSE REASSESSMENT NOTE - NS ED NURSE REASSESS COMMENT FT1
Pt explained the function of the purewick device and risks/benefits and pt verbalized understanding and consented to application of purewick. ED RN cleaned pt prior to application, no skin breakdown noted prior to application.

## 2024-05-16 NOTE — ED PROVIDER NOTE - NSICDXPASTMEDICALHX_GEN_ALL_CORE_FT
PAST MEDICAL HISTORY:  CAD (coronary artery disease)     History of COPD     PVD (peripheral vascular disease)

## 2024-05-16 NOTE — ED ADULT NURSE REASSESSMENT NOTE - NS ED NURSE REASSESS COMMENT FT1
Report received from MEHNAZ Santos. Pt received A&Ox4, vitals retaken and documented. Heparin started at 11ml/hr, rate confirmed and flowing. Pt resting in bed denying any pain ot discomfort. Bed locked and in lowest position, side rails raised, call bell within reach. Currently pending CT results.

## 2024-05-16 NOTE — H&P ADULT - ASSESSMENT
ASSESSMENT: 82yo W pmhx of prior RLE bypass graft presenting with Zac 2 acute limb ischemia.     PLAN:   - Admit to Dr. Sarah  - Heparin gtt + bolus  - Pt to be added on, consented for OR for emergent revascularization and embolectomy  - pt to be consented  - pre-op labs  - NPO/IVF    Plan discussed with surgical attending, Dr. Sarah      Vascular Surgery [id number]  ASSESSMENT: 82yo W pmhx of prior RLE bypass graft presenting with Zac 2 acute limb ischemia.     PLAN:   - Admit to Dr. Sarah  - Heparin gtt + bolus  - Reg diet, NPO at midnight  - restart home meds    Pt seen and plan discussed with surgical attending, Dr. Sarah      Vascular Surgery [id number]

## 2024-05-16 NOTE — ED ADULT TRIAGE NOTE - BP NONINVASIVE DIASTOLIC (MM HG)
[FreeTextEntry1] : I DISCUSSED SURGERY-RESECTION OF FIBROMAS WITH FASCIECTOMY. \par ALL QUESTIONS WERE ASKED AND ANSWERED BY ME REGARDING OPERATION AND POST OP CARE.\par NO DRIVING FOR 1 WEEK TO TEN DAYS. \par NO SMOKE.\par 
90

## 2024-05-16 NOTE — ED ADULT TRIAGE NOTE - CHIEF COMPLAINT QUOTE
bypass surgery on right leg 5 years ago, numbness in the right foot and cold for a few days, [facility] yesterday and dc home dx with plantar fascitis, went to chiropractor today and started to have numbness in the right foot at 1pm, right foot toes are cold to touch in triage  on 2L NC at baseline on home bypass surgery on right leg 5 years ago, numbness in the right foot and cold for a few days, [facility] yesterday and dc home dx with plantar fascitis, went to chiropractor today and started to have numbness in the right foot at 1pm, right foot toes are cold to touch in triage and slightly discolored with decreased sensation mobile RN aware and to slot patient in main ER  on 2L NC at baseline on home bypass surgery on right leg 5 years ago, numbness in the right foot and cold. seen at [facility] yesterday for pain in the right foot and dc home dx with plantar fascitis, went to chiropractor today and started to have numbness in the right foot at 1pm, right foot toes are cold to touch in triage and slightly discolored with decreased sensation mobile RN aware and to slot patient in main ER  on 2L NC at baseline on home

## 2024-05-16 NOTE — ED ADULT NURSE NOTE - NSFALLUNIVINTERV_ED_ALL_ED
Bed/Stretcher in lowest position, wheels locked, appropriate side rails in place/Call bell, personal items and telephone in reach/Instruct patient to call for assistance before getting out of bed/chair/stretcher/Non-slip footwear applied when patient is off stretcher/[place] to call system/Physically safe environment - no spills, clutter or unnecessary equipment/Purposeful proactive rounding/Room/bathroom lighting operational, light cord in reach

## 2024-05-16 NOTE — ED ADULT NURSE NOTE - OBJECTIVE STATEMENT
82 y/o F with PMHx CAD X1 STENT, COPD,HDL,  HTN and PVD s/p graft RLE presenting to ED with c/o numbness, swelling and discoloration to RLE. Pt states on monday she started to have pain to bilateral LE however has noticed increased swelling and numbness, discoloration to RLE. Upon assessment pt has edema to bilateral LE more on the right LE also appears purple in color. pt is  A&Ox4 speaking coherently in full sentences. Pt is breathing unlabored and equal BL in no apparent distress. Denies HA, dizziness, blurry vision. Denies SOB, dyspnea, cough, CP, palpitations. Denies abd pain, N/V/D/C. Abd soft NT/ND. Denies urinary symptoms. Denies fever, chills. No sick contacts. Skin intact, warm, dry, normal for race.

## 2024-05-16 NOTE — ED PROVIDER NOTE - PHYSICAL EXAMINATION
PHYSICAL EXAM:  GENERAL: NAD, lying in bed comfortably  HEAD:  Atraumatic, Normocephalic  EYES: EOMI, conjunctiva and sclera clear  ENT: Moist mucous membranes  NECK: Supple  CHEST/LUNG: Clear to auscultation bilaterally; No rales, rhonchi, wheezing, or rubs. Unlabored respirations  HEART: Regular rate and rhythm; No murmurs, rubs, or gallops  ABDOMEN: Bowel sounds present; Soft, Nontender, Nondistended. No hepatomegally  EXTREMITIES:  RLE cold to touch, purplish discoloration, no DP pulse felt  NERVOUS SYSTEM:  Alert & Oriented X3, speech clear. No deficits   MSK: FROM all 4 extremities, strength of R foot limited 2/2 pain  SKIN: No rashes or lesions

## 2024-05-16 NOTE — ED PROVIDER NOTE - OBJECTIVE STATEMENT
83F h/o CAD (1 stent placed), COPD, HTN, HLD, PVD, s/p bypass graft in RLE, brain aneurysm p/w 3d of RLE pain, numbness. States sxs started as b/l pain in soles of feet Monday night. Went to [facility] who dc'd with dx of plantar fasciitis. Today, R foot became numb and more painful. Also noted purple discoloration. Prompted visit to ED.

## 2024-05-16 NOTE — ED ADULT NURSE NOTE - CHIEF COMPLAINT QUOTE
bypass surgery on right leg 5 years ago, numbness in the right foot and cold. seen at [facility] yesterday for pain in the right foot and dc home dx with plantar fascitis, went to chiropractor today and started to have numbness in the right foot at 1pm, right foot toes are cold to touch in triage and slightly discolored with decreased sensation mobile RN aware and to slot patient in main ER  on 2L NC at baseline on home

## 2024-05-16 NOTE — ED PROVIDER NOTE - ATTENDING CONTRIBUTION TO CARE
83yr F hx of prior cardiac stents COPD htn pvd w RLE graft p/w rt foot pain and discoloration. pt was seen in the ED prior 2 days and discharged however pt had worsening pain since 1pm. called her vascular surgeon and was brought in.  on exam rle appears purple and difficult to palpate pulse. discussed with Dr Sarah at bedside w plan to start heparin, CTA w run offs and likely admit for threatened limb.

## 2024-05-17 ENCOUNTER — RESULT REVIEW (OUTPATIENT)
Age: 84
End: 2024-05-17

## 2024-05-17 LAB
ANION GAP SERPL CALC-SCNC: 13 MMOL/L — SIGNIFICANT CHANGE UP (ref 5–17)
APTT BLD: 59.6 SEC — HIGH (ref 24.5–35.6)
APTT BLD: 77.9 SEC — HIGH (ref 24.5–35.6)
BASOPHILS # BLD AUTO: 0.07 K/UL — SIGNIFICANT CHANGE UP (ref 0–0.2)
BASOPHILS NFR BLD AUTO: 0.7 % — SIGNIFICANT CHANGE UP (ref 0–2)
BUN SERPL-MCNC: 24 MG/DL — HIGH (ref 7–23)
CALCIUM SERPL-MCNC: 8.3 MG/DL — LOW (ref 8.4–10.5)
CHLORIDE SERPL-SCNC: 103 MMOL/L — SIGNIFICANT CHANGE UP (ref 96–108)
CO2 SERPL-SCNC: 23 MMOL/L — SIGNIFICANT CHANGE UP (ref 22–31)
CREAT SERPL-MCNC: 1.07 MG/DL — SIGNIFICANT CHANGE UP (ref 0.5–1.3)
EGFR: 52 ML/MIN/1.73M2 — LOW
EOSINOPHIL # BLD AUTO: 0.11 K/UL — SIGNIFICANT CHANGE UP (ref 0–0.5)
EOSINOPHIL NFR BLD AUTO: 1.1 % — SIGNIFICANT CHANGE UP (ref 0–6)
GLUCOSE SERPL-MCNC: 89 MG/DL — SIGNIFICANT CHANGE UP (ref 70–99)
HCT VFR BLD CALC: 42.9 % — SIGNIFICANT CHANGE UP (ref 34.5–45)
HCT VFR BLD CALC: 44.8 % — SIGNIFICANT CHANGE UP (ref 34.5–45)
HGB BLD-MCNC: 13 G/DL — SIGNIFICANT CHANGE UP (ref 11.5–15.5)
HGB BLD-MCNC: 13.6 G/DL — SIGNIFICANT CHANGE UP (ref 11.5–15.5)
IMM GRANULOCYTES NFR BLD AUTO: 0.8 % — SIGNIFICANT CHANGE UP (ref 0–0.9)
LYMPHOCYTES # BLD AUTO: 0.83 K/UL — LOW (ref 1–3.3)
LYMPHOCYTES # BLD AUTO: 8.4 % — LOW (ref 13–44)
MAGNESIUM SERPL-MCNC: 1.8 MG/DL — SIGNIFICANT CHANGE UP (ref 1.6–2.6)
MCHC RBC-ENTMCNC: 23.1 PG — LOW (ref 27–34)
MCHC RBC-ENTMCNC: 23.2 PG — LOW (ref 27–34)
MCHC RBC-ENTMCNC: 30.3 GM/DL — LOW (ref 32–36)
MCHC RBC-ENTMCNC: 30.4 GM/DL — LOW (ref 32–36)
MCV RBC AUTO: 76.2 FL — LOW (ref 80–100)
MCV RBC AUTO: 76.5 FL — LOW (ref 80–100)
MONOCYTES # BLD AUTO: 0.36 K/UL — SIGNIFICANT CHANGE UP (ref 0–0.9)
MONOCYTES NFR BLD AUTO: 3.7 % — SIGNIFICANT CHANGE UP (ref 2–14)
NEUTROPHILS # BLD AUTO: 8.41 K/UL — HIGH (ref 1.8–7.4)
NEUTROPHILS NFR BLD AUTO: 85.3 % — HIGH (ref 43–77)
NRBC # BLD: 0 /100 WBCS — SIGNIFICANT CHANGE UP (ref 0–0)
NRBC # BLD: 0 /100 WBCS — SIGNIFICANT CHANGE UP (ref 0–0)
PHOSPHATE SERPL-MCNC: 3 MG/DL — SIGNIFICANT CHANGE UP (ref 2.5–4.5)
PLATELET # BLD AUTO: 207 K/UL — SIGNIFICANT CHANGE UP (ref 150–400)
PLATELET # BLD AUTO: 218 K/UL — SIGNIFICANT CHANGE UP (ref 150–400)
POTASSIUM SERPL-MCNC: 3.2 MMOL/L — LOW (ref 3.5–5.3)
POTASSIUM SERPL-SCNC: 3.2 MMOL/L — LOW (ref 3.5–5.3)
RBC # BLD: 5.61 M/UL — HIGH (ref 3.8–5.2)
RBC # BLD: 5.88 M/UL — HIGH (ref 3.8–5.2)
RBC # FLD: 20.4 % — HIGH (ref 10.3–14.5)
RBC # FLD: 20.7 % — HIGH (ref 10.3–14.5)
SODIUM SERPL-SCNC: 139 MMOL/L — SIGNIFICANT CHANGE UP (ref 135–145)
WBC # BLD: 9.25 K/UL — SIGNIFICANT CHANGE UP (ref 3.8–10.5)
WBC # BLD: 9.86 K/UL — SIGNIFICANT CHANGE UP (ref 3.8–10.5)
WBC # FLD AUTO: 9.25 K/UL — SIGNIFICANT CHANGE UP (ref 3.8–10.5)
WBC # FLD AUTO: 9.86 K/UL — SIGNIFICANT CHANGE UP (ref 3.8–10.5)

## 2024-05-17 PROCEDURE — 93306 TTE W/DOPPLER COMPLETE: CPT | Mod: 26

## 2024-05-17 PROCEDURE — 93356 MYOCRD STRAIN IMG SPCKL TRCK: CPT

## 2024-05-17 PROCEDURE — 93010 ELECTROCARDIOGRAM REPORT: CPT

## 2024-05-17 PROCEDURE — 99232 SBSQ HOSP IP/OBS MODERATE 35: CPT | Mod: FS

## 2024-05-17 RX ORDER — PANTOPRAZOLE SODIUM 20 MG/1
40 TABLET, DELAYED RELEASE ORAL
Refills: 0 | Status: DISCONTINUED | OUTPATIENT
Start: 2024-05-17 | End: 2024-05-28

## 2024-05-17 RX ADMIN — Medication 20 MILLIGRAM(S): at 05:14

## 2024-05-17 RX ADMIN — Medication 400 MILLIGRAM(S): at 17:32

## 2024-05-17 RX ADMIN — HYDROMORPHONE HYDROCHLORIDE 0.5 MILLIGRAM(S): 2 INJECTION INTRAMUSCULAR; INTRAVENOUS; SUBCUTANEOUS at 22:34

## 2024-05-17 RX ADMIN — Medication 25 MILLIGRAM(S): at 05:13

## 2024-05-17 RX ADMIN — HEPARIN SODIUM 1100 UNIT(S)/HR: 5000 INJECTION INTRAVENOUS; SUBCUTANEOUS at 14:51

## 2024-05-17 RX ADMIN — CLOPIDOGREL BISULFATE 75 MILLIGRAM(S): 75 TABLET, FILM COATED ORAL at 12:14

## 2024-05-17 RX ADMIN — TIOTROPIUM BROMIDE 2 PUFF(S): 18 CAPSULE ORAL; RESPIRATORY (INHALATION) at 12:13

## 2024-05-17 RX ADMIN — Medication 400 MILLIGRAM(S): at 05:13

## 2024-05-17 RX ADMIN — ATORVASTATIN CALCIUM 40 MILLIGRAM(S): 80 TABLET, FILM COATED ORAL at 21:50

## 2024-05-17 RX ADMIN — Medication 0.5 MILLIGRAM(S): at 17:53

## 2024-05-17 RX ADMIN — HYDROMORPHONE HYDROCHLORIDE 0.5 MILLIGRAM(S): 2 INJECTION INTRAMUSCULAR; INTRAVENOUS; SUBCUTANEOUS at 23:00

## 2024-05-17 RX ADMIN — Medication 400 MILLIGRAM(S): at 12:13

## 2024-05-17 RX ADMIN — HEPARIN SODIUM 1100 UNIT(S)/HR: 5000 INJECTION INTRAVENOUS; SUBCUTANEOUS at 07:51

## 2024-05-17 RX ADMIN — Medication 1000 MILLIGRAM(S): at 18:02

## 2024-05-17 RX ADMIN — Medication 1000 MILLIGRAM(S): at 05:43

## 2024-05-17 RX ADMIN — HEPARIN SODIUM 1100 UNIT(S)/HR: 5000 INJECTION INTRAVENOUS; SUBCUTANEOUS at 19:44

## 2024-05-17 RX ADMIN — AMLODIPINE BESYLATE 5 MILLIGRAM(S): 2.5 TABLET ORAL at 05:13

## 2024-05-17 RX ADMIN — Medication 1000 MILLIGRAM(S): at 12:43

## 2024-05-17 RX ADMIN — AMIODARONE HYDROCHLORIDE 200 MILLIGRAM(S): 400 TABLET ORAL at 05:13

## 2024-05-17 NOTE — CONSULT NOTE ADULT - SUBJECTIVE AND OBJECTIVE BOX
CHIEF COMPLAINT:Patient is a 83y old  Female who presents with a chief complaint of     HISTORY OF PRESENT ILLNESS:    83F h/o CAD (1 stent placed), COPD, HTN, HLD, PAF on amio and a/c , PAD s/p  right ilio-profunda and jump femoro-above knee popliteal bypass in summer 2018, brain aneurysm p/w 3d of RLE pain, numbness. States sxs started as b/l pain in soles of feet Monday night. Went to [facility] who dc'd with dx of plantar fasciitis. Today, R foot became numb and more painful. Also noted purple discoloration. Endorses rest pain, no wounds.   now planned for amputation   cardiology is called for Pre-Operative Cardiac Risk Stratification and Optimization   pt denies any cp , dizziness, syncope   has baseline MARIANO due to emphysema / copd on home O2       PAST MEDICAL & SURGICAL HISTORY:  CAD (coronary artery disease)      History of COPD      PVD (peripheral vascular disease)              MEDICATIONS:  aMIOdarone    Tablet 200 milliGRAM(s) Oral daily  amLODIPine   Tablet 5 milliGRAM(s) Oral daily  anagrelide 0.5 milliGRAM(s) Oral daily  clopidogrel Tablet 75 milliGRAM(s) Oral daily  heparin   Injectable 5000 Unit(s) IV Push every 6 hours PRN  heparin   Injectable 2500 Unit(s) IV Push every 6 hours PRN  heparin  Infusion.  Unit(s)/Hr IV Continuous <Continuous>  metoprolol succinate ER 25 milliGRAM(s) Oral daily  torsemide 20 milliGRAM(s) Oral daily      albuterol    90 MICROgram(s) HFA Inhaler 1 Puff(s) Inhalation two times a day PRN  tiotropium 2.5 MICROgram(s) Inhaler 2 Puff(s) Inhalation daily    acetaminophen   IVPB .. 1000 milliGRAM(s) IV Intermittent every 6 hours  HYDROmorphone  Injectable 0.2 milliGRAM(s) IV Push every 4 hours PRN  HYDROmorphone  Injectable 0.5 milliGRAM(s) IV Push every 4 hours PRN    pantoprazole    Tablet 40 milliGRAM(s) Oral before breakfast    atorvastatin 40 milliGRAM(s) Oral at bedtime    lactated ringers. 1000 milliLiter(s) IV Continuous <Continuous>      FAMILY HISTORY:      Non-contributory    SOCIAL HISTORY:    No tobacco, drugs or etoh    Allergies    No Known Allergies    Intolerances    	    REVIEW OF SYSTEMS:  as above  The rest of the 14 points ROS reviewed and except above they are unremarkable.        PHYSICAL EXAM:  T(C): 36.7 (05-17-24 @ 14:33), Max: 36.9 (05-16-24 @ 17:20)  HR: 59 (05-17-24 @ 11:01) (59 - 66)  BP: 134/72 (05-17-24 @ 14:33) (133/69 - 181/90)  RR: 18 (05-17-24 @ 14:33) (18 - 20)  SpO2: 92% (05-17-24 @ 14:33) (92% - 93%)  Wt(kg): --  I&O's Summary    16 May 2024 07:01  -  17 May 2024 07:00  --------------------------------------------------------  IN: 713 mL / OUT: 400 mL / NET: 313 mL    17 May 2024 07:01  -  17 May 2024 14:47  --------------------------------------------------------  IN: 465 mL / OUT: 600 mL / NET: -135 mL        JVP: Normal  Neck: supple  Lung: clear   CV: S1 S2 , Murmur:  Abd: soft  Ext: No edema  neuro: Awake / alert  Psych: flat affect        LABS/DATA:    TELEMETRY: 	    ECG:  	   	  CARDIAC MARKERS:                        34 <<== 05-16-24 @ 18:35                              13.6   9.86  )-----------( 207      ( 17 May 2024 07:08 )             44.8     05-17    139  |  103  |  24<H>  ----------------------------<  89  3.2<L>   |  23  |  1.07    Ca    8.3<L>      17 May 2024 07:08  Phos  3.0     05-17  Mg     1.8     05-17    TPro  8.5<H>  /  Alb  3.5  /  TBili  0.7  /  DBili  x   /  AST  14  /  ALT  9<L>  /  AlkPhos  93  05-16    proBNP:   Lipid Profile:   HgA1c:   TSH:

## 2024-05-17 NOTE — PATIENT PROFILE ADULT - FALL HARM RISK - HARM RISK INTERVENTIONS
Assistance with ambulation/Assistance OOB with selected safe patient handling equipment/Communicate Risk of Fall with Harm to all staff/Discuss with provider need for PT consult/Monitor gait and stability/Reinforce activity limits and safety measures with patient and family/Tailored Fall Risk Interventions/Visual Cue: Yellow wristband and red socks/Bed in lowest position, wheels locked, appropriate side rails in place/Call bell, personal items and telephone in reach/Instruct patient to call for assistance before getting out of bed or chair/Non-slip footwear when patient is out of bed/[place] to call system/Physically safe environment - no spills, clutter or unnecessary equipment/Purposeful Proactive Rounding/Room/bathroom lighting operational, light cord in reach

## 2024-05-17 NOTE — PROGRESS NOTE ADULT - ATTENDING COMMENTS
84 yo female with complex PMH including severe PAD, COPD s/p right ileo-femoral and fem-pop in the past presented with thrombosis of right ileofemoral and fem pop graft  patient has severe rest pain and foot discoloration  after a long conversation with the patient and her daughter regarding risks and benefits of redo revascularization (ax-redo prufonda with jump bypass to the AT versus primary amputation) the patient and her family elected to undergo primary above knee amputation    all risks , benefits, and alternatives were discussed .

## 2024-05-17 NOTE — PROGRESS NOTE ADULT - SUBJECTIVE AND OBJECTIVE BOX
Surgery Progress Note:    OVERNIGHT EVENTS: NAEO    SUBJECTIVE: Pt seen and examined at bedside. Patient comfortable and in no-apparent distress. Pain is controlled.     MEDICATIONS  (STANDING):  acetaminophen   IVPB .. 1000 milliGRAM(s) IV Intermittent every 6 hours  aMIOdarone    Tablet 200 milliGRAM(s) Oral daily  amLODIPine   Tablet 5 milliGRAM(s) Oral daily  anagrelide 0.5 milliGRAM(s) Oral daily  atorvastatin 40 milliGRAM(s) Oral at bedtime  clopidogrel Tablet 75 milliGRAM(s) Oral daily  heparin  Infusion.  Unit(s)/Hr (11 mL/Hr) IV Continuous <Continuous>  lactated ringers. 1000 milliLiter(s) (75 mL/Hr) IV Continuous <Continuous>  metoprolol succinate ER 25 milliGRAM(s) Oral daily  tiotropium 2.5 MICROgram(s) Inhaler 2 Puff(s) Inhalation daily  torsemide 20 milliGRAM(s) Oral daily    MEDICATIONS  (PRN):  albuterol    90 MICROgram(s) HFA Inhaler 1 Puff(s) Inhalation two times a day PRN for shortness of breath and/or wheezing  heparin   Injectable 5000 Unit(s) IV Push every 6 hours PRN For aPTT less than 40  heparin   Injectable 2500 Unit(s) IV Push every 6 hours PRN For aPTT between 40 - 57  HYDROmorphone  Injectable 0.2 milliGRAM(s) IV Push every 4 hours PRN Moderate Pain (4 - 6)  HYDROmorphone  Injectable 0.5 milliGRAM(s) IV Push every 4 hours PRN Severe Pain (7 - 10)    T(C): 36.8 (05-17-24 @ 05:00), Max: 36.9 (05-16-24 @ 17:20)  HR: 63 (05-17-24 @ 05:00) (63 - 66)  BP: 181/90 (05-17-24 @ 05:00) (169/90 - 181/90)  RR: 19 (05-17-24 @ 05:00) (18 - 20)  SpO2: 92% (05-17-24 @ 05:00) (92% - 93%)    05-16-24 @ 07:01  -  05-17-24 @ 07:00  --------------------------------------------------------  IN: 0 mL / OUT: 400 mL / NET: -400 mL      LABS:                        13.6   9.86  )-----------( 207      ( 17 May 2024 07:08 )             44.8     05-17    139  |  103  |  24<H>  ----------------------------<  89  3.2<L>   |  23  |  1.07    Ca    8.3<L>      17 May 2024 07:08  Phos  3.0     05-17  Mg     1.8     05-17    TPro  8.5<H>  /  Alb  3.5  /  TBili  0.7  /  DBili  x   /  AST  14  /  ALT  9<L>  /  AlkPhos  93  05-16    PT/INR - ( 16 May 2024 18:35 )   PT: 14.6 sec;   INR: 1.40 ratio         PTT - ( 17 May 2024 07:10 )  PTT:59.6 sec  Urinalysis Basic - ( 17 May 2024 07:08 )    Color: x / Appearance: x / SG: x / pH: x  Gluc: 89 mg/dL / Ketone: x  / Bili: x / Urobili: x   Blood: x / Protein: x / Nitrite: x   Leuk Esterase: x / RBC: x / WBC x   Sq Epi: x / Non Sq Epi: x / Bacteria: x    PHYSICAL EXAM  GENERAL: NAD, lying in bed comfortably  CHEST: nonlabored, no increased WOB  ABDOMEN: Soft, Nontender, Nondistended.  EXTREMITIES: RLE discoloration, motor intact, decreased sensation. Dopplerable triphasic femoral signal and monophasic popliteal on RLE.   NERVOUS SYSTEM:  Alert & Oriented X3   Surgery Progress Note:    OVERNIGHT EVENTS: NAEO    SUBJECTIVE: Pt seen and examined at bedside. Patient comfortable and in no-apparent distress. Pain is controlled.     MEDICATIONS  (STANDING):  acetaminophen   IVPB .. 1000 milliGRAM(s) IV Intermittent every 6 hours  aMIOdarone    Tablet 200 milliGRAM(s) Oral daily  amLODIPine   Tablet 5 milliGRAM(s) Oral daily  anagrelide 0.5 milliGRAM(s) Oral daily  atorvastatin 40 milliGRAM(s) Oral at bedtime  clopidogrel Tablet 75 milliGRAM(s) Oral daily  heparin  Infusion.  Unit(s)/Hr (11 mL/Hr) IV Continuous <Continuous>  lactated ringers. 1000 milliLiter(s) (75 mL/Hr) IV Continuous <Continuous>  metoprolol succinate ER 25 milliGRAM(s) Oral daily  tiotropium 2.5 MICROgram(s) Inhaler 2 Puff(s) Inhalation daily  torsemide 20 milliGRAM(s) Oral daily    MEDICATIONS  (PRN):  albuterol    90 MICROgram(s) HFA Inhaler 1 Puff(s) Inhalation two times a day PRN for shortness of breath and/or wheezing  heparin   Injectable 5000 Unit(s) IV Push every 6 hours PRN For aPTT less than 40  heparin   Injectable 2500 Unit(s) IV Push every 6 hours PRN For aPTT between 40 - 57  HYDROmorphone  Injectable 0.2 milliGRAM(s) IV Push every 4 hours PRN Moderate Pain (4 - 6)  HYDROmorphone  Injectable 0.5 milliGRAM(s) IV Push every 4 hours PRN Severe Pain (7 - 10)    T(C): 36.8 (05-17-24 @ 05:00), Max: 36.9 (05-16-24 @ 17:20)  HR: 63 (05-17-24 @ 05:00) (63 - 66)  BP: 181/90 (05-17-24 @ 05:00) (169/90 - 181/90)  RR: 19 (05-17-24 @ 05:00) (18 - 20)  SpO2: 92% (05-17-24 @ 05:00) (92% - 93%)    05-16-24 @ 07:01  -  05-17-24 @ 07:00  --------------------------------------------------------  IN: 0 mL / OUT: 400 mL / NET: -400 mL      LABS:                        13.6   9.86  )-----------( 207      ( 17 May 2024 07:08 )             44.8     05-17    139  |  103  |  24<H>  ----------------------------<  89  3.2<L>   |  23  |  1.07    Ca    8.3<L>      17 May 2024 07:08  Phos  3.0     05-17  Mg     1.8     05-17    TPro  8.5<H>  /  Alb  3.5  /  TBili  0.7  /  DBili  x   /  AST  14  /  ALT  9<L>  /  AlkPhos  93  05-16    PT/INR - ( 16 May 2024 18:35 )   PT: 14.6 sec;   INR: 1.40 ratio         PTT - ( 17 May 2024 07:10 )  PTT:59.6 sec  Urinalysis Basic - ( 17 May 2024 07:08 )    Color: x / Appearance: x / SG: x / pH: x  Gluc: 89 mg/dL / Ketone: x  / Bili: x / Urobili: x   Blood: x / Protein: x / Nitrite: x   Leuk Esterase: x / RBC: x / WBC x   Sq Epi: x / Non Sq Epi: x / Bacteria: x    PHYSICAL EXAM  GENERAL: NAD, lying in bed comfortably  CHEST: nonlabored, no increased WOB  ABDOMEN: Soft, Nontender, Nondistended.  EXTREMITIES: RLE discoloration, motor intact, decreased sensation. Dopplerable triphasic femoral signal and monophasic popliteal on RLE.

## 2024-05-17 NOTE — PATIENT PROFILE ADULT - NSPROIMPLANTSMEDDEV_GEN_A_NUR
Update History & Physical    The patient's History and Physical of April 20, 2023 was reviewed with the patient and I examined the patient. There was no change. The surgical site was confirmed by the patient and me. Plan: The risks, benefits, expected outcome, and alternative to the recommended procedure have been discussed with the patient. Patient understands and wants to proceed with the procedure.      Electronically signed by Raul Damon MD on 5/2/2023 at 10:49 AM
None

## 2024-05-17 NOTE — PROGRESS NOTE ADULT - ASSESSMENT
ASSESSMENT: 82yo W pmhx of prior RLE bypass graft presenting with Zac 2 acute limb ischemia.     PLAN:   - Heparin gtt  - Reg diet  - home meds  - ctm vascular exam    Vascular Surgery ASSESSMENT: 83F pmhx of prior RLE bypass graft presenting with Zac 2 acute limb ischemia.     PLAN:   - Heparin gtt  - Reg diet  - home meds  - ctm vascular exam    Vascular Surgery ASSESSMENT: 83F pmhx of prior RLE bypass graft presenting with Zac 2 acute limb ischemia.     PLAN:   - Reg diet  - home meds  - Heparin gtt, plavix, agrylin  - ctm vascular exam    Vascular Surgery

## 2024-05-18 DIAGNOSIS — J44.9 CHRONIC OBSTRUCTIVE PULMONARY DISEASE, UNSPECIFIED: ICD-10-CM

## 2024-05-18 DIAGNOSIS — I73.9 PERIPHERAL VASCULAR DISEASE, UNSPECIFIED: ICD-10-CM

## 2024-05-18 LAB
ANION GAP SERPL CALC-SCNC: 16 MMOL/L — SIGNIFICANT CHANGE UP (ref 5–17)
APTT BLD: 62.9 SEC — HIGH (ref 24.5–35.6)
BASOPHILS # BLD AUTO: 0.06 K/UL — SIGNIFICANT CHANGE UP (ref 0–0.2)
BASOPHILS NFR BLD AUTO: 0.8 % — SIGNIFICANT CHANGE UP (ref 0–2)
BUN SERPL-MCNC: 32 MG/DL — HIGH (ref 7–23)
CALCIUM SERPL-MCNC: 8.8 MG/DL — SIGNIFICANT CHANGE UP (ref 8.4–10.5)
CHLORIDE SERPL-SCNC: 105 MMOL/L — SIGNIFICANT CHANGE UP (ref 96–108)
CO2 SERPL-SCNC: 23 MMOL/L — SIGNIFICANT CHANGE UP (ref 22–31)
CREAT SERPL-MCNC: 1.42 MG/DL — HIGH (ref 0.5–1.3)
EGFR: 37 ML/MIN/1.73M2 — LOW
EOSINOPHIL # BLD AUTO: 0.15 K/UL — SIGNIFICANT CHANGE UP (ref 0–0.5)
EOSINOPHIL NFR BLD AUTO: 1.9 % — SIGNIFICANT CHANGE UP (ref 0–6)
GLUCOSE SERPL-MCNC: 78 MG/DL — SIGNIFICANT CHANGE UP (ref 70–99)
HCT VFR BLD CALC: 44 % — SIGNIFICANT CHANGE UP (ref 34.5–45)
HGB BLD-MCNC: 13.2 G/DL — SIGNIFICANT CHANGE UP (ref 11.5–15.5)
IMM GRANULOCYTES NFR BLD AUTO: 0.8 % — SIGNIFICANT CHANGE UP (ref 0–0.9)
LYMPHOCYTES # BLD AUTO: 1.17 K/UL — SIGNIFICANT CHANGE UP (ref 1–3.3)
LYMPHOCYTES # BLD AUTO: 15.1 % — SIGNIFICANT CHANGE UP (ref 13–44)
MAGNESIUM SERPL-MCNC: 2.2 MG/DL — SIGNIFICANT CHANGE UP (ref 1.6–2.6)
MCHC RBC-ENTMCNC: 23.4 PG — LOW (ref 27–34)
MCHC RBC-ENTMCNC: 30 GM/DL — LOW (ref 32–36)
MCV RBC AUTO: 78 FL — LOW (ref 80–100)
MONOCYTES # BLD AUTO: 0.28 K/UL — SIGNIFICANT CHANGE UP (ref 0–0.9)
MONOCYTES NFR BLD AUTO: 3.6 % — SIGNIFICANT CHANGE UP (ref 2–14)
NEUTROPHILS # BLD AUTO: 6.02 K/UL — SIGNIFICANT CHANGE UP (ref 1.8–7.4)
NEUTROPHILS NFR BLD AUTO: 77.8 % — HIGH (ref 43–77)
NRBC # BLD: 0 /100 WBCS — SIGNIFICANT CHANGE UP (ref 0–0)
PHOSPHATE SERPL-MCNC: 4.3 MG/DL — SIGNIFICANT CHANGE UP (ref 2.5–4.5)
PLATELET # BLD AUTO: 227 K/UL — SIGNIFICANT CHANGE UP (ref 150–400)
POTASSIUM SERPL-MCNC: 3.6 MMOL/L — SIGNIFICANT CHANGE UP (ref 3.5–5.3)
POTASSIUM SERPL-SCNC: 3.6 MMOL/L — SIGNIFICANT CHANGE UP (ref 3.5–5.3)
RBC # BLD: 5.64 M/UL — HIGH (ref 3.8–5.2)
RBC # FLD: 20.6 % — HIGH (ref 10.3–14.5)
SODIUM SERPL-SCNC: 144 MMOL/L — SIGNIFICANT CHANGE UP (ref 135–145)
WBC # BLD: 7.74 K/UL — SIGNIFICANT CHANGE UP (ref 3.8–10.5)
WBC # FLD AUTO: 7.74 K/UL — SIGNIFICANT CHANGE UP (ref 3.8–10.5)

## 2024-05-18 PROCEDURE — 99232 SBSQ HOSP IP/OBS MODERATE 35: CPT

## 2024-05-18 RX ORDER — ACETAMINOPHEN 500 MG
975 TABLET ORAL EVERY 6 HOURS
Refills: 0 | Status: DISCONTINUED | OUTPATIENT
Start: 2024-05-18 | End: 2024-05-24

## 2024-05-18 RX ORDER — ANAGRELIDE HCL 0.5 MG
1 CAPSULE ORAL
Refills: 0 | Status: DISCONTINUED | OUTPATIENT
Start: 2024-05-18 | End: 2024-05-20

## 2024-05-18 RX ORDER — HEPARIN SODIUM 5000 [USP'U]/ML
1100 INJECTION INTRAVENOUS; SUBCUTANEOUS
Qty: 25000 | Refills: 0 | Status: DISCONTINUED | OUTPATIENT
Start: 2024-05-18 | End: 2024-05-20

## 2024-05-18 RX ORDER — HYDROMORPHONE HYDROCHLORIDE 2 MG/ML
0.5 INJECTION INTRAMUSCULAR; INTRAVENOUS; SUBCUTANEOUS ONCE
Refills: 0 | Status: DISCONTINUED | OUTPATIENT
Start: 2024-05-18 | End: 2024-05-18

## 2024-05-18 RX ORDER — ANAGRELIDE HCL 0.5 MG
1 CAPSULE ORAL DAILY
Refills: 0 | Status: DISCONTINUED | OUTPATIENT
Start: 2024-05-18 | End: 2024-05-18

## 2024-05-18 RX ORDER — IPRATROPIUM/ALBUTEROL SULFATE 18-103MCG
3 AEROSOL WITH ADAPTER (GRAM) INHALATION EVERY 6 HOURS
Refills: 0 | Status: DISCONTINUED | OUTPATIENT
Start: 2024-05-18 | End: 2024-05-28

## 2024-05-18 RX ORDER — BUDESONIDE AND FORMOTEROL FUMARATE DIHYDRATE 160; 4.5 UG/1; UG/1
2 AEROSOL RESPIRATORY (INHALATION)
Refills: 0 | Status: DISCONTINUED | OUTPATIENT
Start: 2024-05-18 | End: 2024-05-28

## 2024-05-18 RX ORDER — HYDROMORPHONE HYDROCHLORIDE 2 MG/ML
2 INJECTION INTRAMUSCULAR; INTRAVENOUS; SUBCUTANEOUS ONCE
Refills: 0 | Status: DISCONTINUED | OUTPATIENT
Start: 2024-05-18 | End: 2024-05-18

## 2024-05-18 RX ADMIN — HEPARIN SODIUM 1100 UNIT(S)/HR: 5000 INJECTION INTRAVENOUS; SUBCUTANEOUS at 07:14

## 2024-05-18 RX ADMIN — Medication 975 MILLIGRAM(S): at 12:34

## 2024-05-18 RX ADMIN — HYDROMORPHONE HYDROCHLORIDE 0.5 MILLIGRAM(S): 2 INJECTION INTRAMUSCULAR; INTRAVENOUS; SUBCUTANEOUS at 06:30

## 2024-05-18 RX ADMIN — PANTOPRAZOLE SODIUM 40 MILLIGRAM(S): 20 TABLET, DELAYED RELEASE ORAL at 05:37

## 2024-05-18 RX ADMIN — Medication 25 MILLIGRAM(S): at 05:36

## 2024-05-18 RX ADMIN — CLOPIDOGREL BISULFATE 75 MILLIGRAM(S): 75 TABLET, FILM COATED ORAL at 11:34

## 2024-05-18 RX ADMIN — Medication 975 MILLIGRAM(S): at 11:34

## 2024-05-18 RX ADMIN — HEPARIN SODIUM 11 UNIT(S)/HR: 5000 INJECTION INTRAVENOUS; SUBCUTANEOUS at 20:19

## 2024-05-18 RX ADMIN — Medication 975 MILLIGRAM(S): at 01:28

## 2024-05-18 RX ADMIN — Medication 975 MILLIGRAM(S): at 00:29

## 2024-05-18 RX ADMIN — Medication 3 MILLILITER(S): at 17:17

## 2024-05-18 RX ADMIN — Medication 3 MILLILITER(S): at 23:52

## 2024-05-18 RX ADMIN — Medication 1 MILLIGRAM(S): at 20:05

## 2024-05-18 RX ADMIN — AMLODIPINE BESYLATE 5 MILLIGRAM(S): 2.5 TABLET ORAL at 05:36

## 2024-05-18 RX ADMIN — Medication 20 MILLIGRAM(S): at 05:36

## 2024-05-18 RX ADMIN — HEPARIN SODIUM 11 UNIT(S)/HR: 5000 INJECTION INTRAVENOUS; SUBCUTANEOUS at 11:35

## 2024-05-18 RX ADMIN — TIOTROPIUM BROMIDE 2 PUFF(S): 18 CAPSULE ORAL; RESPIRATORY (INHALATION) at 11:34

## 2024-05-18 RX ADMIN — Medication 975 MILLIGRAM(S): at 23:06

## 2024-05-18 RX ADMIN — Medication 0.5 MILLIGRAM(S): at 11:34

## 2024-05-18 RX ADMIN — HYDROMORPHONE HYDROCHLORIDE 0.5 MILLIGRAM(S): 2 INJECTION INTRAMUSCULAR; INTRAVENOUS; SUBCUTANEOUS at 05:34

## 2024-05-18 RX ADMIN — BUDESONIDE AND FORMOTEROL FUMARATE DIHYDRATE 2 PUFF(S): 160; 4.5 AEROSOL RESPIRATORY (INHALATION) at 17:17

## 2024-05-18 RX ADMIN — AMIODARONE HYDROCHLORIDE 200 MILLIGRAM(S): 400 TABLET ORAL at 05:36

## 2024-05-18 RX ADMIN — ATORVASTATIN CALCIUM 40 MILLIGRAM(S): 80 TABLET, FILM COATED ORAL at 22:07

## 2024-05-18 RX ADMIN — HYDROMORPHONE HYDROCHLORIDE 0.5 MILLIGRAM(S): 2 INJECTION INTRAMUSCULAR; INTRAVENOUS; SUBCUTANEOUS at 23:29

## 2024-05-18 RX ADMIN — Medication 975 MILLIGRAM(S): at 22:06

## 2024-05-18 RX ADMIN — HYDROMORPHONE HYDROCHLORIDE 2 MILLIGRAM(S): 2 INJECTION INTRAMUSCULAR; INTRAVENOUS; SUBCUTANEOUS at 15:52

## 2024-05-18 RX ADMIN — HYDROMORPHONE HYDROCHLORIDE 2 MILLIGRAM(S): 2 INJECTION INTRAMUSCULAR; INTRAVENOUS; SUBCUTANEOUS at 14:52

## 2024-05-18 NOTE — PROGRESS NOTE ADULT - SUBJECTIVE AND OBJECTIVE BOX
Surgery Progress Note:    OVERNIGHT EVENTS: NAEO    SUBJECTIVE: Pt seen and examined at bedside. Patient comfortable and in no-apparent distress. Pain is controlled.     MEDICATIONS  (STANDING):  aMIOdarone    Tablet 200 milliGRAM(s) Oral daily  amLODIPine   Tablet 5 milliGRAM(s) Oral daily  anagrelide 0.5 milliGRAM(s) Oral daily  atorvastatin 40 milliGRAM(s) Oral at bedtime  clopidogrel Tablet 75 milliGRAM(s) Oral daily  heparin  Infusion.  Unit(s)/Hr (11 mL/Hr) IV Continuous <Continuous>  metoprolol succinate ER 25 milliGRAM(s) Oral daily  pantoprazole    Tablet 40 milliGRAM(s) Oral before breakfast  tiotropium 2.5 MICROgram(s) Inhaler 2 Puff(s) Inhalation daily  torsemide 20 milliGRAM(s) Oral daily    MEDICATIONS  (PRN):  acetaminophen     Tablet .. 975 milliGRAM(s) Oral every 6 hours PRN Mild Pain (1 - 3)  albuterol    90 MICROgram(s) HFA Inhaler 1 Puff(s) Inhalation two times a day PRN for shortness of breath and/or wheezing  heparin   Injectable 2500 Unit(s) IV Push every 6 hours PRN For aPTT between 40 - 57  heparin   Injectable 5000 Unit(s) IV Push every 6 hours PRN For aPTT less than 40    T(C): 36.8 (05-18-24 @ 10:07), Max: 37.1 (05-17-24 @ 17:05)  HR: 56 (05-18-24 @ 10:07) (53 - 67)  BP: 165/63 (05-18-24 @ 10:07) (133/69 - 165/63)  RR: 20 (05-18-24 @ 10:07) (18 - 22)  SpO2: 91% (05-18-24 @ 10:07) (91% - 94%)    05-17-24 @ 07:01  -  05-18-24 @ 07:00  --------------------------------------------------------  IN: 785 mL / OUT: 600 mL / NET: 185 mL    05-18-24 @ 07:01  -  05-18-24 @ 10:35  --------------------------------------------------------  IN: 0 mL / OUT: 600 mL / NET: -600 mL      LABS:                        13.2   7.74  )-----------( 227      ( 18 May 2024 07:48 )             44.0     05-18    144  |  105  |  32<H>  ----------------------------<  78  3.6   |  23  |  1.42<H>    Ca    8.8      18 May 2024 07:48  Phos  4.3     05-18  Mg     2.2     05-18    TPro  8.5<H>  /  Alb  3.5  /  TBili  0.7  /  DBili  x   /  AST  14  /  ALT  9<L>  /  AlkPhos  93  05-16    PT/INR - ( 16 May 2024 18:35 )   PT: 14.6 sec;   INR: 1.40 ratio         PTT - ( 18 May 2024 07:48 )  PTT:62.9 sec  Urinalysis Basic - ( 18 May 2024 07:48 )    Color: x / Appearance: x / SG: x / pH: x  Gluc: 78 mg/dL / Ketone: x  / Bili: x / Urobili: x   Blood: x / Protein: x / Nitrite: x   Leuk Esterase: x / RBC: x / WBC x   Sq Epi: x / Non Sq Epi: x / Bacteria: x    PHYSICAL EXAM  GENERAL: NAD, lying in bed comfortably  CHEST: nonlabored, no increased WOB  ABDOMEN: Soft, Nontender, Nondistended.  EXTREMITIES: RLE discoloration, motor intact, decreased sensation. Dopplerable triphasic femoral signal and monophasic popliteal on RLE.

## 2024-05-18 NOTE — CONSULT NOTE ADULT - SUBJECTIVE AND OBJECTIVE BOX
PULMONARY CONSULT    HPI: 84 y/o F with PMH of CAD (s/p stent), COPD, HTN, HLD, PAD s/p  right ilio-profunda and jump femoro-above knee popliteal bypass in summer 2018, brain aneurysm p/w 3d of RLE pain, numbness. States sxs started as b/l pain in soles of feet Monday night. [facility] who dc'd with dx of plantar fasciitis. Today, R foot became numb and more painful. Also noted purple discoloration. Endorses rest pain, no wounds.         PAST MEDICAL & SURGICAL HISTORY:  CAD (coronary artery disease)      History of COPD      PVD (peripheral vascular disease)        FAMILY HISTORY:    [X] Family history not pertinent as reviewed with the patient and family    SOCIAL HISTORY:      ALLERGIES: No Known Allergies      ROS: 10-system review is otherwise negative except HPI above.      T(C): 36.9 (05-16-24 @ 17:20), Max: 36.9 (05-16-24 @ 17:20)  HR: 66 (05-16-24 @ 17:20) (66 - 66)  BP: 169/90 (05-16-24 @ 17:20) (169/90 - 169/90)  RR: 20 (05-16-24 @ 17:20) (20 - 20)  SpO2: 92% (05-16-24 @ 17:20) (92% - 92%)    PHYSICAL EXAM  GENERAL: NAD, lying in bed comfortably  CHEST: nonlabored, no increased WOB  ABDOMEN: Soft, Nontender, Nondistended.  EXTREMITIES: RLE discoloration, motor intact, decreased sensation. Dopplerable triphasic femoral signal and monophasic popliteal on RLE.   NERVOUS SYSTEM:  Alert & Oriented X3      OBJECTIVE  No Known Allergies    I&O's Summary    I&O's Detail    LABS                        14.3   10.58 )-----------( 232      ( 16 May 2024 18:35 )             47.2     05-16    140  |  104  |  32<H>  ----------------------------<  97  3.7   |  22  |  1.34<H>    Ca    8.8      16 May 2024 18:35    TPro  8.5<H>  /  Alb  3.5  /  TBili  0.7  /  DBili  x   /  AST  14  /  ALT  9<L>  /  AlkPhos  93  05-16    PT/INR - ( 16 May 2024 18:35 )   PT: 14.6 sec;   INR: 1.40 ratio         PTT - ( 16 May 2024 18:35 )  PTT:32.5 sec  Urinalysis Basic - ( 16 May 2024 18:35 )    Color: x / Appearance: x / SG: x / pH: x  Gluc: 97 mg/dL / Ketone: x  / Bili: x / Urobili: x   Blood: x / Protein: x / Nitrite: x   Leuk Esterase: x / RBC: x / WBC x   Sq Epi: x / Non Sq Epi: x / Bacteria: x          IMAGING   (16 May 2024 19:56)      BRIEF HOSPITAL COURSE: ***    PAST MEDICAL & SURGICAL HISTORY:  CAD (coronary artery disease)      History of COPD      PVD (peripheral vascular disease)        Allergies    No Known Allergies    Intolerances      FAMILY HISTORY:    Social history:     Review of Systems:  CONSTITUTIONAL: No fever, chills, or fatigue  EYES: No eye pain, visual disturbances, or discharge  ENMT:  No difficulty hearing, tinnitus, vertigo; No sinus or throat pain  NECK: No pain or stiffness  RESPIRATORY: Per above  CARDIOVASCULAR: No chest pain, palpitations, dizziness, or leg swelling  GASTROINTESTINAL: No abdominal or epigastric pain. No nausea, vomiting, or hematemesis; No diarrhea or constipation. No melena or hematochezia.  GENITOURINARY: No dysuria, frequency, hematuria, or incontinence  NEUROLOGICAL: No headaches, memory loss, loss of strength, numbness, or tremors  SKIN: No itching, burning, rashes, or lesions   MUSCULOSKELETAL: No joint pain or swelling; No muscle, back, or extremity pain  PSYCHIATRIC: No depression, anxiety, mood swings, or difficulty sleeping      Medications:  MEDICATIONS  (STANDING):  aMIOdarone    Tablet 200 milliGRAM(s) Oral daily  amLODIPine   Tablet 5 milliGRAM(s) Oral daily  anagrelide 0.5 milliGRAM(s) Oral daily  atorvastatin 40 milliGRAM(s) Oral at bedtime  clopidogrel Tablet 75 milliGRAM(s) Oral daily  heparin  Infusion 1100 Unit(s)/Hr (11 mL/Hr) IV Continuous <Continuous>  metoprolol succinate ER 25 milliGRAM(s) Oral daily  pantoprazole    Tablet 40 milliGRAM(s) Oral before breakfast  tiotropium 2.5 MICROgram(s) Inhaler 2 Puff(s) Inhalation daily  torsemide 20 milliGRAM(s) Oral daily    MEDICATIONS  (PRN):  acetaminophen     Tablet .. 975 milliGRAM(s) Oral every 6 hours PRN Mild Pain (1 - 3)  albuterol    90 MICROgram(s) HFA Inhaler 1 Puff(s) Inhalation two times a day PRN for shortness of breath and/or wheezing            Vital Signs Last 24 Hrs  T(C): 36.8 (18 May 2024 10:07), Max: 37.1 (17 May 2024 17:05)  T(F): 98.2 (18 May 2024 10:07), Max: 98.8 (17 May 2024 17:05)  HR: 56 (18 May 2024 10:07) (53 - 67)  BP: 165/63 (18 May 2024 10:07) (134/72 - 165/63)  BP(mean): --  RR: 20 (18 May 2024 10:07) (18 - 22)  SpO2: 91% (18 May 2024 10:07) (91% - 94%)    Parameters below as of 18 May 2024 10:07  Patient On (Oxygen Delivery Method): nasal cannula  O2 Flow (L/min): 3              05-17 @ 07:01  -  05-18 @ 07:00  --------------------------------------------------------  IN: 785 mL / OUT: 600 mL / NET: 185 mL          LABS:                        13.2   7.74  )-----------( 227      ( 18 May 2024 07:48 )             44.0     05-18    144  |  105  |  32<H>  ----------------------------<  78  3.6   |  23  |  1.42<H>    Ca    8.8      18 May 2024 07:48  Phos  4.3     05-18  Mg     2.2     05-18    TPro  8.5<H>  /  Alb  3.5  /  TBili  0.7  /  DBili  x   /  AST  14  /  ALT  9<L>  /  AlkPhos  93  05-16          CAPILLARY BLOOD GLUCOSE        PT/INR - ( 16 May 2024 18:35 )   PT: 14.6 sec;   INR: 1.40 ratio         PTT - ( 18 May 2024 07:48 )  PTT:62.9 sec  Urinalysis Basic - ( 18 May 2024 07:48 )    Color: x / Appearance: x / SG: x / pH: x  Gluc: 78 mg/dL / Ketone: x  / Bili: x / Urobili: x   Blood: x / Protein: x / Nitrite: x   Leuk Esterase: x / RBC: x / WBC x   Sq Epi: x / Non Sq Epi: x / Bacteria: x                    CULTURES: (if applicable)                    Physical Examination:    General: No acute distress.      HEENT: Pupils equal, reactive to light.  Symmetric.    PULM: Clear to auscultation bilaterally, no significant sputum production    CVS: S1, S2    ABD: Soft, nondistended, nontender, normoactive bowel sounds, no masses    EXT: No edema, nontender    SKIN: Warm and well perfused, no rashes noted.    NEURO: Alert, oriented, interactive, nonfocal    RADIOLOGY REVIEWED  CXR:    CT chest:    TTE:   PULMONARY CONSULT    HPI: 82 y/o F with PMH of CAD (s/p stent), COPD on home o2, HTN, HLD, PAD, brain aneurysm. Presents with RLE pain, numbness. Plan for vascular intervention, possible amputation. Pulmonary called to consult for COPD. Former smoker (at least 40 pack years, quit 7 years ago). Follows with outpt pulm in [place]. On home O2 x few years, baseline 2LNC qHS and PRN. Not on chronic steroids. Home meds Anoro Ellipta. Reports hx of lymphadenopathy had PET/CT as an outpt 1 month ago - reports no change since prior. Denies CP, SOB, cough, sputum production. O2 sats 92% on 3LNC.       PAST MEDICAL & SURGICAL HISTORY:  CAD (coronary artery disease)  History of COPD  PVD (peripheral vascular disease)    No Known Allergies    FAMILY HISTORY: non contributory     Social history: former smoker     Review of Systems:  CONSTITUTIONAL: No fever, chills, or fatigue  EYES: No eye pain, visual disturbances, or discharge  ENMT:  No difficulty hearing, tinnitus, vertigo; No sinus or throat pain  NECK: No pain or stiffness  RESPIRATORY: Per above  CARDIOVASCULAR: No chest pain, palpitations, dizziness, or leg swelling  GASTROINTESTINAL: No abdominal or epigastric pain. No nausea, vomiting, or hematemesis; No diarrhea or constipation. No melena or hematochezia.  GENITOURINARY: No dysuria, frequency, hematuria, or incontinence  NEUROLOGICAL: No headaches, memory loss, loss of strength, numbness, or tremors  SKIN: per above   MUSCULOSKELETAL: per above   PSYCHIATRIC: No depression, anxiety, mood swings, or difficulty sleeping      Medications:  MEDICATIONS  (STANDING):  aMIOdarone    Tablet 200 milliGRAM(s) Oral daily  amLODIPine   Tablet 5 milliGRAM(s) Oral daily  anagrelide 0.5 milliGRAM(s) Oral daily  atorvastatin 40 milliGRAM(s) Oral at bedtime  clopidogrel Tablet 75 milliGRAM(s) Oral daily  heparin  Infusion 1100 Unit(s)/Hr (11 mL/Hr) IV Continuous <Continuous>  metoprolol succinate ER 25 milliGRAM(s) Oral daily  pantoprazole    Tablet 40 milliGRAM(s) Oral before breakfast  tiotropium 2.5 MICROgram(s) Inhaler 2 Puff(s) Inhalation daily  torsemide 20 milliGRAM(s) Oral daily    MEDICATIONS  (PRN):  acetaminophen     Tablet .. 975 milliGRAM(s) Oral every 6 hours PRN Mild Pain (1 - 3)  albuterol    90 MICROgram(s) HFA Inhaler 1 Puff(s) Inhalation two times a day PRN for shortness of breath and/or wheezing    Vital Signs Last 24 Hrs  T(C): 36.8 (18 May 2024 10:07), Max: 37.1 (17 May 2024 17:05)  T(F): 98.2 (18 May 2024 10:07), Max: 98.8 (17 May 2024 17:05)  HR: 56 (18 May 2024 10:07) (53 - 67)  BP: 165/63 (18 May 2024 10:07) (134/72 - 165/63)  BP(mean): --  RR: 20 (18 May 2024 10:07) (18 - 22)  SpO2: 91% (18 May 2024 10:07) (91% - 94%)    Parameters below as of 18 May 2024 10:07  Patient On (Oxygen Delivery Method): nasal cannula  O2 Flow (L/min): 3              05-17 @ 07:01  -  05-18 @ 07:00  --------------------------------------------------------  IN: 785 mL / OUT: 600 mL / NET: 185 mL          LABS:                        13.2   7.74  )-----------( 227      ( 18 May 2024 07:48 )             44.0     05-18    144  |  105  |  32<H>  ----------------------------<  78  3.6   |  23  |  1.42<H>    Ca    8.8      18 May 2024 07:48  Phos  4.3     05-18  Mg     2.2     05-18    TPro  8.5<H>  /  Alb  3.5  /  TBili  0.7  /  DBili  x   /  AST  14  /  ALT  9<L>  /  AlkPhos  93  05-16          CAPILLARY BLOOD GLUCOSE        PT/INR - ( 16 May 2024 18:35 )   PT: 14.6 sec;   INR: 1.40 ratio         PTT - ( 18 May 2024 07:48 )  PTT:62.9 sec  Urinalysis Basic - ( 18 May 2024 07:48 )    Color: x / Appearance: x / SG: x / pH: x  Gluc: 78 mg/dL / Ketone: x  / Bili: x / Urobili: x   Blood: x / Protein: x / Nitrite: x   Leuk Esterase: x / RBC: x / WBC x   Sq Epi: x / Non Sq Epi: x / Bacteria: x    Physical Examination:    General: No acute distress.      HEENT: Pupils equal, reactive to light.  Symmetric.    PULM: Decreased BS     CVS: S1, S2    ABD: Soft, nondistended, nontender, normoactive bowel sounds, no masses    EXT: No edema, nontender    SKIN: Warm and well perfused, no rashes noted.    NEURO: Alert, oriented, interactive, nonfocal

## 2024-05-18 NOTE — PROGRESS NOTE ADULT - ASSESSMENT
ASSESSMENT: 83F pmhx of prior RLE bypass graft presenting with [place] 2 acute limb ischemia.     PLAN:   - Reg diet  - home meds  - Heparin gtt, plavix, agrylin  - ctm vascular exam  - possible amputation vs continue w/ ac    Vascular Surgery ASSESSMENT: 83F pmhx of prior RLE bypass graft presenting with [place] 2 acute limb ischemia.     PLAN:   - Reg diet  - home meds  - Heparin gtt, plavix, agrylin  - ctm vascular exam  - tentative planning for AKA 5/20     Vascular Surgery ASSESSMENT: 83F pmhx of prior RLE bypass graft presenting with [place] 2 acute limb ischemia.     PLAN:   - Reg diet  - home meds  - Heparin gtt, plavix, agrylin    - ctm vascular exam  - tentative planning for AKA 5/20   - consent signed in chart; preop 5/19    Vascular Surgery

## 2024-05-18 NOTE — CONSULT NOTE ADULT - PROBLEM SELECTOR RECOMMENDATION 9
Cpt Code ([address]1992, [address] [place] [address]): 15954 Cpt Code: 80078 -On home O2 baseline 2LNC qHS and PRN  -Suggest Duoneb q6h  -Suggest Symbicort 160/4.5 mcg 2 puffs BID  -Continue Spiriva  -Keep sats >90% with O2  -Reports PET/CT done 1 month ago due to lymphadenopathy in upper chest  -CT chest non-contrast ordered  -VBG ordered. -On home O2 baseline 2LNC qHS and PRN  -Suggest Duoneb q6h  -Suggest Symbicort 160/4.5 mcg 2 puffs BID  -Continue Spiriva  -Keep sats >90% with O2  -Reports PET/CT done 1 month ago due to ?lymphadenopathy in upper chest, states was negative  -CT chest non-contrast ordered  -VBG ordered.

## 2024-05-18 NOTE — PROGRESS NOTE ADULT - SUBJECTIVE AND OBJECTIVE BOX
Subjective: Patient seen and examined. No new events except as noted.     SUBJECTIVE/ROS:  No chest pain, dyspnea, palpitation, or dizziness.       MEDICATIONS:  MEDICATIONS  (STANDING):  albuterol/ipratropium for Nebulization 3 milliLiter(s) Nebulizer every 6 hours  aMIOdarone    Tablet 200 milliGRAM(s) Oral daily  amLODIPine   Tablet 5 milliGRAM(s) Oral daily  anagrelide 0.5 milliGRAM(s) Oral daily  atorvastatin 40 milliGRAM(s) Oral at bedtime  budesonide 160 MICROgram(s)/formoterol 4.5 MICROgram(s) Inhaler 2 Puff(s) Inhalation two times a day  clopidogrel Tablet 75 milliGRAM(s) Oral daily  heparin  Infusion 1100 Unit(s)/Hr (11 mL/Hr) IV Continuous <Continuous>  HYDROmorphone   Tablet 2 milliGRAM(s) Oral once  metoprolol succinate ER 25 milliGRAM(s) Oral daily  pantoprazole    Tablet 40 milliGRAM(s) Oral before breakfast  tiotropium 2.5 MICROgram(s) Inhaler 2 Puff(s) Inhalation daily  torsemide 20 milliGRAM(s) Oral daily      PHYSICAL EXAM:  T(C): 36.7 (05-18-24 @ 13:14), Max: 37.1 (05-17-24 @ 17:05)  HR: 60 (05-18-24 @ 13:14) (54 - 67)  BP: 153/95 (05-18-24 @ 13:14) (145/64 - 165/63)  RR: 20 (05-18-24 @ 13:14) (18 - 22)  SpO2: 91% (05-18-24 @ 13:14) (91% - 94%)  Wt(kg): --  I&O's Summary    17 May 2024 07:01  -  18 May 2024 07:00  --------------------------------------------------------  IN: 785 mL / OUT: 600 mL / NET: 185 mL    18 May 2024 07:01  -  18 May 2024 14:48  --------------------------------------------------------  IN: 600 mL / OUT: 1000 mL / NET: -400 mL            JVP: Normal  Neck: supple  Lung: clear   CV: S1 S2 , Murmur:  Abd: soft  Ext: No edema  neuro: Awake / alert  Psych: flat affect  Skin: normal``    LABS/DATA:    CARDIAC MARKERS:                                13.2   7.74  )-----------( 227      ( 18 May 2024 07:48 )             44.0     05-18    144  |  105  |  32<H>  ----------------------------<  78  3.6   |  23  |  1.42<H>    Ca    8.8      18 May 2024 07:48  Phos  4.3     05-18  Mg     2.2     05-18    TPro  8.5<H>  /  Alb  3.5  /  TBili  0.7  /  DBili  x   /  AST  14  /  ALT  9<L>  /  AlkPhos  93  05-16    proBNP:   Lipid Profile:   HgA1c:   TSH:     TELE:  EKG:        < from: TTE W or WO Ultrasound Enhancing Agent (05.17.24 @ 13:32) >  ___________________________________     CONCLUSIONS:      1. Left ventricular cavity is normal in size. Left ventricular wall thickness is normal. Left ventricular systolic function is normal with an ejection fraction of 63 % by Boyer's method of disks. There are no regional wall motion abnormalities seen.   2. There is moderate (grade 2) left ventricular diastolic dysfunction, with elevated filling pressure.   3. Normal right ventricular cavity size, with normal wall thickness, and normal systolic function.   4. The left atrium is severely dilated.   5. The right atrium is moderately dilated.   6. Normal left and right atrial size.   7. No significant valvular disease.   8. No pericardial effusion seen.   9. Left ventricular global longitudinal strain is -24.0 % which is normal (< -18%). Images were acquired on a [employer] ultrasound system and processed using [employer] strain analysis software with a heart rate of 53 bpm and a blood pressure of 133/69 mmHg.  10. Systolic anterior motion of the mitral valve with an increased left ventricular outflow tract gradient of 23 mmHg indicative of mild left ventricular outflow tract obstruction.    < end of copied text >

## 2024-05-18 NOTE — CONSULT NOTE ADULT - NS ATTEND AMEND GEN_ALL_CORE FT
suggest add symbicort 160/4.5 two puffs bid  add duoneb q6h  continue o2 and keep sat>90%  CT chest wo contrast  ac as per vasc

## 2024-05-18 NOTE — PROGRESS NOTE ADULT - ASSESSMENT
PAD  plan for amputation   fu with vascular surgery     CAD  s/p PCI   recent cath jan 2024 with patent stent  cont plavix  on statin   on BB    history of cardiomyopathy   cont BB  echo     PAF  on amio  avoid qt prolonging drugs  on a/c for now with heparin     Pre-Operative Cardiac Risk Stratification and Optimization   Based on patient history and physical exam, the patient is considered to have high risk   on optimal meds  recent cath jan 2024 without need for intervention   echo shows normal LV /RV function   appreciate pulm input  can proceed to this time sensitive surgery with acceptable elevated risk     Thank you for allowing me to participate in the care of this patient.   Fidencio Roman MD, [facility]  [address] [address]  [phone]        Advanced care planning was discussed with patient and family.  Risks, benefits and alternatives of the cardiac treatments and medical therapy including procedures were discussed in detail and all questions were answered. Importance of compliance with medical therapy and lifestyle modification to improve cardiovascular health were addressed. Appropriate forms and patient educational materials were reviewed. 30 minutes face to face spent.

## 2024-05-19 LAB
ANION GAP SERPL CALC-SCNC: 12 MMOL/L — SIGNIFICANT CHANGE UP (ref 5–17)
APTT BLD: 94.8 SEC — HIGH (ref 24.5–35.6)
BASE EXCESS BLDV CALC-SCNC: 2.4 MMOL/L — SIGNIFICANT CHANGE UP (ref -2–3)
BUN SERPL-MCNC: 38 MG/DL — HIGH (ref 7–23)
CALCIUM SERPL-MCNC: 8.9 MG/DL — SIGNIFICANT CHANGE UP (ref 8.4–10.5)
CHLORIDE SERPL-SCNC: 105 MMOL/L — SIGNIFICANT CHANGE UP (ref 96–108)
CO2 BLDV-SCNC: 30 MMOL/L — HIGH (ref 22–26)
CO2 SERPL-SCNC: 24 MMOL/L — SIGNIFICANT CHANGE UP (ref 22–31)
CREAT SERPL-MCNC: 1.78 MG/DL — HIGH (ref 0.5–1.3)
EGFR: 28 ML/MIN/1.73M2 — LOW
GAS PNL BLDV: SIGNIFICANT CHANGE UP
GLUCOSE SERPL-MCNC: 86 MG/DL — SIGNIFICANT CHANGE UP (ref 70–99)
HCO3 BLDV-SCNC: 29 MMOL/L — SIGNIFICANT CHANGE UP (ref 22–29)
HCT VFR BLD CALC: 43.4 % — SIGNIFICANT CHANGE UP (ref 34.5–45)
HCT VFR BLD CALC: 43.7 % — SIGNIFICANT CHANGE UP (ref 34.5–45)
HGB BLD-MCNC: 12.8 G/DL — SIGNIFICANT CHANGE UP (ref 11.5–15.5)
HGB BLD-MCNC: 12.9 G/DL — SIGNIFICANT CHANGE UP (ref 11.5–15.5)
MAGNESIUM SERPL-MCNC: 2.1 MG/DL — SIGNIFICANT CHANGE UP (ref 1.6–2.6)
MCHC RBC-ENTMCNC: 23.1 PG — LOW (ref 27–34)
MCHC RBC-ENTMCNC: 23.2 PG — LOW (ref 27–34)
MCHC RBC-ENTMCNC: 29.3 GM/DL — LOW (ref 32–36)
MCHC RBC-ENTMCNC: 29.7 GM/DL — LOW (ref 32–36)
MCV RBC AUTO: 78.2 FL — LOW (ref 80–100)
MCV RBC AUTO: 79 FL — LOW (ref 80–100)
NRBC # BLD: 0 /100 WBCS — SIGNIFICANT CHANGE UP (ref 0–0)
NRBC # BLD: 0 /100 WBCS — SIGNIFICANT CHANGE UP (ref 0–0)
PCO2 BLDV: 51 MMHG — HIGH (ref 39–42)
PH BLDV: 7.36 — SIGNIFICANT CHANGE UP (ref 7.32–7.43)
PHOSPHATE SERPL-MCNC: 4.3 MG/DL — SIGNIFICANT CHANGE UP (ref 2.5–4.5)
PLATELET # BLD AUTO: 222 K/UL — SIGNIFICANT CHANGE UP (ref 150–400)
PLATELET # BLD AUTO: 227 K/UL — SIGNIFICANT CHANGE UP (ref 150–400)
PO2 BLDV: 131 MMHG — HIGH (ref 25–45)
POTASSIUM SERPL-MCNC: 3.8 MMOL/L — SIGNIFICANT CHANGE UP (ref 3.5–5.3)
POTASSIUM SERPL-SCNC: 3.8 MMOL/L — SIGNIFICANT CHANGE UP (ref 3.5–5.3)
RBC # BLD: 5.53 M/UL — HIGH (ref 3.8–5.2)
RBC # BLD: 5.55 M/UL — HIGH (ref 3.8–5.2)
RBC # FLD: 20.5 % — HIGH (ref 10.3–14.5)
RBC # FLD: 20.5 % — HIGH (ref 10.3–14.5)
SAO2 % BLDV: 99.1 % — HIGH (ref 67–88)
SODIUM SERPL-SCNC: 141 MMOL/L — SIGNIFICANT CHANGE UP (ref 135–145)
WBC # BLD: 7.8 K/UL — SIGNIFICANT CHANGE UP (ref 3.8–10.5)
WBC # BLD: 7.84 K/UL — SIGNIFICANT CHANGE UP (ref 3.8–10.5)
WBC # FLD AUTO: 7.8 K/UL — SIGNIFICANT CHANGE UP (ref 3.8–10.5)
WBC # FLD AUTO: 7.84 K/UL — SIGNIFICANT CHANGE UP (ref 3.8–10.5)

## 2024-05-19 PROCEDURE — 99232 SBSQ HOSP IP/OBS MODERATE 35: CPT | Mod: 57

## 2024-05-19 PROCEDURE — 71250 CT THORAX DX C-: CPT | Mod: 26

## 2024-05-19 RX ORDER — LANOLIN ALCOHOL/MO/W.PET/CERES
3 CREAM (GRAM) TOPICAL AT BEDTIME
Refills: 0 | Status: DISCONTINUED | OUTPATIENT
Start: 2024-05-19 | End: 2024-05-28

## 2024-05-19 RX ORDER — OXYCODONE HYDROCHLORIDE 5 MG/1
5 TABLET ORAL EVERY 4 HOURS
Refills: 0 | Status: DISCONTINUED | OUTPATIENT
Start: 2024-05-19 | End: 2024-05-20

## 2024-05-19 RX ORDER — OXYCODONE HYDROCHLORIDE 5 MG/1
2.5 TABLET ORAL EVERY 4 HOURS
Refills: 0 | Status: DISCONTINUED | OUTPATIENT
Start: 2024-05-19 | End: 2024-05-20

## 2024-05-19 RX ADMIN — Medication 975 MILLIGRAM(S): at 14:24

## 2024-05-19 RX ADMIN — Medication 975 MILLIGRAM(S): at 13:24

## 2024-05-19 RX ADMIN — OXYCODONE HYDROCHLORIDE 5 MILLIGRAM(S): 5 TABLET ORAL at 23:25

## 2024-05-19 RX ADMIN — OXYCODONE HYDROCHLORIDE 5 MILLIGRAM(S): 5 TABLET ORAL at 19:32

## 2024-05-19 RX ADMIN — Medication 25 MILLIGRAM(S): at 06:16

## 2024-05-19 RX ADMIN — Medication 3 MILLILITER(S): at 12:17

## 2024-05-19 RX ADMIN — AMIODARONE HYDROCHLORIDE 200 MILLIGRAM(S): 400 TABLET ORAL at 06:14

## 2024-05-19 RX ADMIN — OXYCODONE HYDROCHLORIDE 5 MILLIGRAM(S): 5 TABLET ORAL at 15:37

## 2024-05-19 RX ADMIN — Medication 975 MILLIGRAM(S): at 05:14

## 2024-05-19 RX ADMIN — TIOTROPIUM BROMIDE 2 PUFF(S): 18 CAPSULE ORAL; RESPIRATORY (INHALATION) at 12:17

## 2024-05-19 RX ADMIN — Medication 3 MILLIGRAM(S): at 23:22

## 2024-05-19 RX ADMIN — OXYCODONE HYDROCHLORIDE 5 MILLIGRAM(S): 5 TABLET ORAL at 18:32

## 2024-05-19 RX ADMIN — Medication 1 MILLIGRAM(S): at 08:10

## 2024-05-19 RX ADMIN — HEPARIN SODIUM 11 UNIT(S)/HR: 5000 INJECTION INTRAVENOUS; SUBCUTANEOUS at 19:46

## 2024-05-19 RX ADMIN — AMLODIPINE BESYLATE 5 MILLIGRAM(S): 2.5 TABLET ORAL at 06:15

## 2024-05-19 RX ADMIN — Medication 3 MILLILITER(S): at 17:17

## 2024-05-19 RX ADMIN — BUDESONIDE AND FORMOTEROL FUMARATE DIHYDRATE 2 PUFF(S): 160; 4.5 AEROSOL RESPIRATORY (INHALATION) at 06:16

## 2024-05-19 RX ADMIN — Medication 975 MILLIGRAM(S): at 04:14

## 2024-05-19 RX ADMIN — BUDESONIDE AND FORMOTEROL FUMARATE DIHYDRATE 2 PUFF(S): 160; 4.5 AEROSOL RESPIRATORY (INHALATION) at 17:18

## 2024-05-19 RX ADMIN — OXYCODONE HYDROCHLORIDE 5 MILLIGRAM(S): 5 TABLET ORAL at 22:55

## 2024-05-19 RX ADMIN — Medication 20 MILLIGRAM(S): at 06:14

## 2024-05-19 RX ADMIN — HEPARIN SODIUM 11 UNIT(S)/HR: 5000 INJECTION INTRAVENOUS; SUBCUTANEOUS at 07:25

## 2024-05-19 RX ADMIN — HYDROMORPHONE HYDROCHLORIDE 0.5 MILLIGRAM(S): 2 INJECTION INTRAMUSCULAR; INTRAVENOUS; SUBCUTANEOUS at 00:29

## 2024-05-19 RX ADMIN — ATORVASTATIN CALCIUM 40 MILLIGRAM(S): 80 TABLET, FILM COATED ORAL at 21:12

## 2024-05-19 RX ADMIN — CLOPIDOGREL BISULFATE 75 MILLIGRAM(S): 75 TABLET, FILM COATED ORAL at 12:16

## 2024-05-19 RX ADMIN — PANTOPRAZOLE SODIUM 40 MILLIGRAM(S): 20 TABLET, DELAYED RELEASE ORAL at 06:15

## 2024-05-19 RX ADMIN — Medication 3 MILLILITER(S): at 23:22

## 2024-05-19 RX ADMIN — Medication 1 MILLIGRAM(S): at 19:59

## 2024-05-19 RX ADMIN — Medication 3 MILLILITER(S): at 06:14

## 2024-05-19 RX ADMIN — OXYCODONE HYDROCHLORIDE 5 MILLIGRAM(S): 5 TABLET ORAL at 14:37

## 2024-05-19 NOTE — PROVIDER CONTACT NOTE (OTHER) - ASSESSMENT
pt aox4, vs stable, on Heparin drip, and reporting pain moderately controlled. Pt had small bloody nose for around 2 minutes. Pt states she is prone to bloody noses and that the oxygen she is on is making nose dry. Pt showing no other signs/symptoms of bleeding

## 2024-05-19 NOTE — PROGRESS NOTE ADULT - ASSESSMENT
PAD  plan for amputation   fu with vascular surgery     CAD  s/p PCI   recent cath jan 2024 with patent stent  cont plavix  on statin   on BB    PAF  on amio  avoid qt prolonging drugs  on a/c for now with heparin     Pre-Operative Cardiac Risk Stratification and Optimization   Based on patient history and physical exam, the patient is considered to have high risk   on optimal meds  recent cath jan 2024 without need for intervention   echo shows normal LV /RV function   appreciate pulm input  can proceed to this time sensitive surgery with acceptable elevated risk

## 2024-05-19 NOTE — PROGRESS NOTE ADULT - SUBJECTIVE AND OBJECTIVE BOX
Vascular Surgery Progress Note    SUBJECTIVE  The patient was seen and examined. No acute events overnight. Pain controlled, afebrile w/ stable vitals.     OBJECTIVE  ___________________________________________________  VITAL SIGNS / I&O's   Vital Signs Last 24 Hrs  T(C): 36.6 (19 May 2024 10:08), Max: 37.1 (19 May 2024 01:04)  T(F): 97.8 (19 May 2024 10:08), Max: 98.7 (19 May 2024 01:04)  HR: 61 (19 May 2024 10:08) (53 - 65)  BP: 102/49 (19 May 2024 10:08) (102/49 - 161/71)  BP(mean): --  RR: 20 (19 May 2024 10:08) (20 - 20)  SpO2: 92% (19 May 2024 10:08) (89% - 93%)    Parameters below as of 19 May 2024 10:08  Patient On (Oxygen Delivery Method): nasal cannula  O2 Flow (L/min): 3        18 May 2024 07:01  -  19 May 2024 07:00  --------------------------------------------------------  IN:    Heparin: 264 mL    Oral Fluid: 1080 mL  Total IN: 1344 mL    OUT:    Voided (mL): 1400 mL  Total OUT: 1400 mL    Total NET: -56 mL        ___________________________________________________  PHYSICAL EXAM    GENERAL: NAD, lying in bed comfortably  CHEST: nonlabored, no increased WOB  ABDOMEN: Soft, Nontender, Nondistended.  EXTREMITIES: RLE discoloration, motor intact, decreased sensation. No dopplerable DP/PT signals  ___________________________________________________  LABS                        12.8   7.80  )-----------( 222      ( 19 May 2024 07:20 )             43.7     19 May 2024 07:20    141    |  105    |  38     ----------------------------<  86     3.8     |  24     |  1.78     Ca    8.9        19 May 2024 07:20  Phos  4.3       19 May 2024 07:21  Mg     2.1       19 May 2024 07:21      PTT - ( 19 May 2024 07:20 )  PTT:94.8 sec  CAPILLARY BLOOD GLUCOSE            Urinalysis Basic - ( 19 May 2024 07:20 )    Color: x / Appearance: x / SG: x / pH: x  Gluc: 86 mg/dL / Ketone: x  / Bili: x / Urobili: x   Blood: x / Protein: x / Nitrite: x   Leuk Esterase: x / RBC: x / WBC x   Sq Epi: x / Non Sq Epi: x / Bacteria: x      ___________________________________________________  MICRO  Recent Cultures:    ___________________________________________________  MEDICATIONS  (STANDING):  albuterol/ipratropium for Nebulization 3 milliLiter(s) Nebulizer every 6 hours  aMIOdarone    Tablet 200 milliGRAM(s) Oral daily  amLODIPine   Tablet 5 milliGRAM(s) Oral daily  anagrelide 1 milliGRAM(s) Oral <User Schedule>  atorvastatin 40 milliGRAM(s) Oral at bedtime  budesonide 160 MICROgram(s)/formoterol 4.5 MICROgram(s) Inhaler 2 Puff(s) Inhalation two times a day  clopidogrel Tablet 75 milliGRAM(s) Oral daily  heparin  Infusion 1100 Unit(s)/Hr (11 mL/Hr) IV Continuous <Continuous>  metoprolol succinate ER 25 milliGRAM(s) Oral daily  pantoprazole    Tablet 40 milliGRAM(s) Oral before breakfast  tiotropium 2.5 MICROgram(s) Inhaler 2 Puff(s) Inhalation daily  torsemide 20 milliGRAM(s) Oral daily    MEDICATIONS  (PRN):  acetaminophen     Tablet .. 975 milliGRAM(s) Oral every 6 hours PRN Mild Pain (1 - 3)

## 2024-05-19 NOTE — PROGRESS NOTE ADULT - ASSESSMENT
83F h/o CAD (1 stent placed), COPD, HTN, HLD, PAF on amio and a/c , PAD s/p  right ilio-profunda and jump femoro-above knee popliteal bypass in summer 2018, brain aneurysm p/w 3d of RLE pain, numbness. States sxs started as b/l pain in soles of feet Monday night. [facility] to [facility] who [facility]'d with dx of plantar fasciitis. Today, R foot became numb and more painful. Also noted purple discoloration. Endorses rest pain, no wounds.       # PAD  R LE   plan for amputation  vascular care appreciated   on heparin drip  patient is medically optimized for OR, elevated risk     # CAD   Heparin full dose  plavix  statin  BP control   Echo noted for grade 2 diastolic  card follow up     # A fib  on amio  rate control  AC with heparin   Crad follow up     # COPD  O2 supplement  pulm connor up  83F h/o CAD (1 stent placed), COPD, HTN, HLD, PAF on amio and a/c , PAD s/p  right ilio-profunda and jump femoro-above knee popliteal bypass in summer 2018, brain aneurysm p/w 3d of RLE pain, numbness. States sxs started as b/l pain in soles of feet Monday night. Went to [facility] who dc'd with dx of plantar fasciitis. Today, R foot became numb and more painful. Also noted purple discoloration. Endorses rest pain, no wounds.       # PAD  R LE   plan for amputation  vascular care appreciated   on heparin drip  patient is medically optimized for OR, elevated risk     # CAD   Heparin full dose  plavix  statin  BP control   Echo noted for grade 2 diastolic  card follow up     # TOBIAS  likely SCHUYLER  monitor urine output   check bladder scan  avoid nephrotoxic medication  oral hydration     # A fib  on amio  rate control  AC with heparin   Crad follow up     # COPD  O2 supplement  pulm connor up

## 2024-05-19 NOTE — PROGRESS NOTE ADULT - SUBJECTIVE AND OBJECTIVE BOX
Subjective: Patient seen and examined. No new events except as noted.     SUBJECTIVE/ROS:  nad      MEDICATIONS:  MEDICATIONS  (STANDING):  albuterol/ipratropium for Nebulization 3 milliLiter(s) Nebulizer every 6 hours  aMIOdarone    Tablet 200 milliGRAM(s) Oral daily  amLODIPine   Tablet 5 milliGRAM(s) Oral daily  anagrelide 1 milliGRAM(s) Oral <User Schedule>  atorvastatin 40 milliGRAM(s) Oral at bedtime  budesonide 160 MICROgram(s)/formoterol 4.5 MICROgram(s) Inhaler 2 Puff(s) Inhalation two times a day  clopidogrel Tablet 75 milliGRAM(s) Oral daily  heparin  Infusion 1100 Unit(s)/Hr (11 mL/Hr) IV Continuous <Continuous>  metoprolol succinate ER 25 milliGRAM(s) Oral daily  pantoprazole    Tablet 40 milliGRAM(s) Oral before breakfast  tiotropium 2.5 MICROgram(s) Inhaler 2 Puff(s) Inhalation daily  torsemide 20 milliGRAM(s) Oral daily      PHYSICAL EXAM:  T(C): 36.9 (05-19-24 @ 05:00), Max: 37.1 (05-19-24 @ 01:04)  HR: 65 (05-19-24 @ 05:00) (53 - 65)  BP: 129/67 (05-19-24 @ 05:00) (129/67 - 165/63)  RR: 20 (05-19-24 @ 05:00) (20 - 20)  SpO2: 93% (05-19-24 @ 05:00) (89% - 93%)  Wt(kg): --  I&O's Summary    18 May 2024 07:01  -  19 May 2024 07:00  --------------------------------------------------------  IN: 1344 mL / OUT: 1400 mL / NET: -56 mL            JVP: Normal  Neck: supple  Lung: clear   CV: S1 S2 , Murmur:  Abd: soft  Ext: No edema  neuro: Awake / alert  Psych: flat affect  Skin: normal``    LABS/DATA:    CARDIAC MARKERS:                                12.8   7.80  )-----------( 222      ( 19 May 2024 07:20 )             43.7     05-18    144  |  105  |  32<H>  ----------------------------<  78  3.6   |  23  |  1.42<H>    Ca    8.8      18 May 2024 07:48  Phos  4.3     05-18  Mg     2.2     05-18      proBNP:   Lipid Profile:   HgA1c:   TSH:     TELE:  EKG:

## 2024-05-19 NOTE — PROGRESS NOTE ADULT - SUBJECTIVE AND OBJECTIVE BOX
Name of Patient : ANGIE LOPES  MRN: [id number]      Subjective: Patient seen and examined. No new events except as noted.   LE pain  on heparin  pain control     REVIEW OF SYSTEMS:    CONSTITUTIONAL: No weakness, fevers or chills  EYES/ENT: No visual changes;  No vertigo or throat pain   NECK: No pain or stiffness  RESPIRATORY: No cough, wheezing, hemoptysis; No shortness of breath  CARDIOVASCULAR: No chest pain or palpitations  GASTROINTESTINAL: No abdominal or epigastric pain. No nausea, vomiting, or hematemesis; No diarrhea or constipation. No melena or hematochezia.  GENITOURINARY: No dysuria, frequency or hematuria  NEUROLOGICAL: No numbness or weakness  SKIN: No itching, burning, rashes, or lesions   All other review of systems is negative unless indicated above.    MEDICATIONS:  MEDICATIONS  (STANDING):  albuterol/ipratropium for Nebulization 3 milliLiter(s) Nebulizer every 6 hours  aMIOdarone    Tablet 200 milliGRAM(s) Oral daily  amLODIPine   Tablet 5 milliGRAM(s) Oral daily  anagrelide 1 milliGRAM(s) Oral <User Schedule>  atorvastatin 40 milliGRAM(s) Oral at bedtime  budesonide 160 MICROgram(s)/formoterol 4.5 MICROgram(s) Inhaler 2 Puff(s) Inhalation two times a day  clopidogrel Tablet 75 milliGRAM(s) Oral daily  heparin  Infusion 1100 Unit(s)/Hr (11 mL/Hr) IV Continuous <Continuous>  melatonin 3 milliGRAM(s) Oral at bedtime  metoprolol succinate ER 25 milliGRAM(s) Oral daily  pantoprazole    Tablet 40 milliGRAM(s) Oral before breakfast  tiotropium 2.5 MICROgram(s) Inhaler 2 Puff(s) Inhalation daily  torsemide 20 milliGRAM(s) Oral daily      PHYSICAL EXAM:  T(C): 36.9 (05-19-24 @ 21:51), Max: 37.1 (05-19-24 @ 01:04)  HR: 68 (05-19-24 @ 22:16) (56 - 68)  BP: 154/78 (05-19-24 @ 22:16) (102/49 - 167/75)  RR: 19 (05-19-24 @ 22:16) (19 - 20)  SpO2: 91% (05-19-24 @ 22:16) (89% - 96%)  Wt(kg): --  I&O's Summary    18 May 2024 07:01  -  19 May 2024 07:00  --------------------------------------------------------  IN: 1344 mL / OUT: 1400 mL / NET: -56 mL    19 May 2024 07:01  -  20 May 2024 00:03  --------------------------------------------------------  IN: 852 mL / OUT: 0 mL / NET: 852 mL          Appearance: Normal	  HEENT:  PERRLA   Lymphatic: No lymphadenopathy   Cardiovascular: Normal S1 S2, no JVD  Respiratory: normal effort , clear  Gastrointestinal:  Soft, Non-tender  Skin: No rashes,  warm to touch  Psychiatry:  Mood & affect appropriate  Musculuskeletal: R STEVEN capellan     recent labs, Imaging and EKGs personally reviewed             05-18-24 @ 07:01  -  05-19-24 @ 07:00  --------------------------------------------------------  IN: 1344 mL / OUT: 1400 mL / NET: -56 mL    05-19-24 @ 07:01  -  05-20-24 @ 00:03  --------------------------------------------------------  IN: 852 mL / OUT: 0 mL / NET: 852 mL                            12.8   7.80  )-----------( 222      ( 19 May 2024 07:20 )             43.7               05-19    141  |  105  |  38<H>  ----------------------------<  86  3.8   |  24  |  1.78<H>    Ca    8.9      19 May 2024 07:20  Phos  4.3     05-19  Mg     2.1     05-19      PTT - ( 19 May 2024 07:20 )  PTT:94.8 sec                   Urinalysis Basic - ( 19 May 2024 07:20 )    Color: x / Appearance: x / SG: x / pH: x  Gluc: 86 mg/dL / Ketone: x  / Bili: x / Urobili: x   Blood: x / Protein: x / Nitrite: x   Leuk Esterase: x / RBC: x / WBC x   Sq Epi: x / Non Sq Epi: x / Bacteria: x

## 2024-05-19 NOTE — PROGRESS NOTE ADULT - ASSESSMENT
ASSESSMENT: 83F pmhx of prior RLE bypass graft presenting with [place] 2 acute limb ischemia.     PLAN:   - NPO at midnight for R AKA  - 4AM labs  - home meds restarted  - Heparin gtt, plavix, agrylin    - ctm vascular exam  - planning for AKA 5/20   - consent signed in chart; preop 5/19    Vascular Surgery

## 2024-05-20 LAB
ALBUMIN SERPL ELPH-MCNC: 3.4 G/DL — SIGNIFICANT CHANGE UP (ref 3.3–5)
ALP SERPL-CCNC: 83 U/L — SIGNIFICANT CHANGE UP (ref 40–120)
ALT FLD-CCNC: 8 U/L — LOW (ref 10–45)
ANION GAP SERPL CALC-SCNC: 11 MMOL/L — SIGNIFICANT CHANGE UP (ref 5–17)
APTT BLD: 72.7 SEC — HIGH (ref 24.5–35.6)
APTT BLD: 80.9 SEC — HIGH (ref 24.5–35.6)
AST SERPL-CCNC: 11 U/L — SIGNIFICANT CHANGE UP (ref 10–40)
BILIRUB SERPL-MCNC: 0.5 MG/DL — SIGNIFICANT CHANGE UP (ref 0.2–1.2)
BLD GP AB SCN SERPL QL: NEGATIVE — SIGNIFICANT CHANGE UP
BUN SERPL-MCNC: 33 MG/DL — HIGH (ref 7–23)
CALCIUM SERPL-MCNC: 8.7 MG/DL — SIGNIFICANT CHANGE UP (ref 8.4–10.5)
CHLORIDE SERPL-SCNC: 103 MMOL/L — SIGNIFICANT CHANGE UP (ref 96–108)
CO2 SERPL-SCNC: 26 MMOL/L — SIGNIFICANT CHANGE UP (ref 22–31)
CREAT SERPL-MCNC: 1.38 MG/DL — HIGH (ref 0.5–1.3)
EGFR: 38 ML/MIN/1.73M2 — LOW
GLUCOSE SERPL-MCNC: 106 MG/DL — HIGH (ref 70–99)
HCT VFR BLD CALC: 42.2 % — SIGNIFICANT CHANGE UP (ref 34.5–45)
HCT VFR BLD CALC: 45.8 % — HIGH (ref 34.5–45)
HGB BLD-MCNC: 12.9 G/DL — SIGNIFICANT CHANGE UP (ref 11.5–15.5)
HGB BLD-MCNC: 14 G/DL — SIGNIFICANT CHANGE UP (ref 11.5–15.5)
INR BLD: 1.12 RATIO — SIGNIFICANT CHANGE UP (ref 0.85–1.18)
MAGNESIUM SERPL-MCNC: 2 MG/DL — SIGNIFICANT CHANGE UP (ref 1.6–2.6)
MCHC RBC-ENTMCNC: 23.5 PG — LOW (ref 27–34)
MCHC RBC-ENTMCNC: 23.7 PG — LOW (ref 27–34)
MCHC RBC-ENTMCNC: 30.6 GM/DL — LOW (ref 32–36)
MCHC RBC-ENTMCNC: 30.6 GM/DL — LOW (ref 32–36)
MCV RBC AUTO: 77 FL — LOW (ref 80–100)
MCV RBC AUTO: 77.6 FL — LOW (ref 80–100)
NRBC # BLD: 0 /100 WBCS — SIGNIFICANT CHANGE UP (ref 0–0)
NRBC # BLD: 0 /100 WBCS — SIGNIFICANT CHANGE UP (ref 0–0)
PHOSPHATE SERPL-MCNC: 3.6 MG/DL — SIGNIFICANT CHANGE UP (ref 2.5–4.5)
PLATELET # BLD AUTO: 190 K/UL — SIGNIFICANT CHANGE UP (ref 150–400)
PLATELET # BLD AUTO: 205 K/UL — SIGNIFICANT CHANGE UP (ref 150–400)
POTASSIUM SERPL-MCNC: 3.5 MMOL/L — SIGNIFICANT CHANGE UP (ref 3.5–5.3)
POTASSIUM SERPL-SCNC: 3.5 MMOL/L — SIGNIFICANT CHANGE UP (ref 3.5–5.3)
PROT SERPL-MCNC: 7.6 G/DL — SIGNIFICANT CHANGE UP (ref 6–8.3)
PROTHROM AB SERPL-ACNC: 12.3 SEC — SIGNIFICANT CHANGE UP (ref 9.5–13)
RBC # BLD: 5.48 M/UL — HIGH (ref 3.8–5.2)
RBC # BLD: 5.9 M/UL — HIGH (ref 3.8–5.2)
RBC # FLD: 20.2 % — HIGH (ref 10.3–14.5)
RBC # FLD: 20.9 % — HIGH (ref 10.3–14.5)
RH IG SCN BLD-IMP: POSITIVE — SIGNIFICANT CHANGE UP
SODIUM SERPL-SCNC: 140 MMOL/L — SIGNIFICANT CHANGE UP (ref 135–145)
WBC # BLD: 8.18 K/UL — SIGNIFICANT CHANGE UP (ref 3.8–10.5)
WBC # BLD: 9.75 K/UL — SIGNIFICANT CHANGE UP (ref 3.8–10.5)
WBC # FLD AUTO: 8.18 K/UL — SIGNIFICANT CHANGE UP (ref 3.8–10.5)
WBC # FLD AUTO: 9.75 K/UL — SIGNIFICANT CHANGE UP (ref 3.8–10.5)

## 2024-05-20 PROCEDURE — 99232 SBSQ HOSP IP/OBS MODERATE 35: CPT | Mod: FS

## 2024-05-20 RX ORDER — HEPARIN SODIUM 5000 [USP'U]/ML
5000 INJECTION INTRAVENOUS; SUBCUTANEOUS ONCE
Refills: 0 | Status: COMPLETED | OUTPATIENT
Start: 2024-05-20 | End: 2024-05-20

## 2024-05-20 RX ORDER — POLYETHYLENE GLYCOL 3350 17 G/17G
17 POWDER, FOR SOLUTION ORAL DAILY
Refills: 0 | Status: DISCONTINUED | OUTPATIENT
Start: 2024-05-20 | End: 2024-05-23

## 2024-05-20 RX ORDER — SENNA PLUS 8.6 MG/1
2 TABLET ORAL AT BEDTIME
Refills: 0 | Status: DISCONTINUED | OUTPATIENT
Start: 2024-05-20 | End: 2024-05-28

## 2024-05-20 RX ORDER — OXYCODONE HYDROCHLORIDE 5 MG/1
10 TABLET ORAL EVERY 4 HOURS
Refills: 0 | Status: DISCONTINUED | OUTPATIENT
Start: 2024-05-20 | End: 2024-05-21

## 2024-05-20 RX ORDER — OXYCODONE HYDROCHLORIDE 5 MG/1
5 TABLET ORAL EVERY 4 HOURS
Refills: 0 | Status: DISCONTINUED | OUTPATIENT
Start: 2024-05-20 | End: 2024-05-26

## 2024-05-20 RX ORDER — HYDROMORPHONE HYDROCHLORIDE 2 MG/ML
0.5 INJECTION INTRAMUSCULAR; INTRAVENOUS; SUBCUTANEOUS EVERY 4 HOURS
Refills: 0 | Status: DISCONTINUED | OUTPATIENT
Start: 2024-05-20 | End: 2024-05-24

## 2024-05-20 RX ORDER — HEPARIN SODIUM 5000 [USP'U]/ML
INJECTION INTRAVENOUS; SUBCUTANEOUS
Qty: 25000 | Refills: 0 | Status: DISCONTINUED | OUTPATIENT
Start: 2024-05-20 | End: 2024-05-21

## 2024-05-20 RX ORDER — OXYCODONE HYDROCHLORIDE 5 MG/1
10 TABLET ORAL EVERY 4 HOURS
Refills: 0 | Status: DISCONTINUED | OUTPATIENT
Start: 2024-05-20 | End: 2024-05-24

## 2024-05-20 RX ORDER — ASPIRIN/CALCIUM CARB/MAGNESIUM 324 MG
81 TABLET ORAL DAILY
Refills: 0 | Status: DISCONTINUED | OUTPATIENT
Start: 2024-05-20 | End: 2024-05-24

## 2024-05-20 RX ADMIN — BUDESONIDE AND FORMOTEROL FUMARATE DIHYDRATE 2 PUFF(S): 160; 4.5 AEROSOL RESPIRATORY (INHALATION) at 05:11

## 2024-05-20 RX ADMIN — Medication 20 MILLIGRAM(S): at 05:12

## 2024-05-20 RX ADMIN — HYDROMORPHONE HYDROCHLORIDE 0.5 MILLIGRAM(S): 2 INJECTION INTRAMUSCULAR; INTRAVENOUS; SUBCUTANEOUS at 18:00

## 2024-05-20 RX ADMIN — ATORVASTATIN CALCIUM 40 MILLIGRAM(S): 80 TABLET, FILM COATED ORAL at 21:14

## 2024-05-20 RX ADMIN — PANTOPRAZOLE SODIUM 40 MILLIGRAM(S): 20 TABLET, DELAYED RELEASE ORAL at 05:12

## 2024-05-20 RX ADMIN — OXYCODONE HYDROCHLORIDE 10 MILLIGRAM(S): 5 TABLET ORAL at 19:23

## 2024-05-20 RX ADMIN — Medication 3 MILLILITER(S): at 05:11

## 2024-05-20 RX ADMIN — Medication 1 MILLIGRAM(S): at 10:20

## 2024-05-20 RX ADMIN — Medication 3 MILLILITER(S): at 23:06

## 2024-05-20 RX ADMIN — Medication 25 MILLIGRAM(S): at 05:12

## 2024-05-20 RX ADMIN — SENNA PLUS 2 TABLET(S): 8.6 TABLET ORAL at 21:14

## 2024-05-20 RX ADMIN — OXYCODONE HYDROCHLORIDE 5 MILLIGRAM(S): 5 TABLET ORAL at 15:20

## 2024-05-20 RX ADMIN — Medication 3 MILLILITER(S): at 14:43

## 2024-05-20 RX ADMIN — TIOTROPIUM BROMIDE 2 PUFF(S): 18 CAPSULE ORAL; RESPIRATORY (INHALATION) at 14:43

## 2024-05-20 RX ADMIN — OXYCODONE HYDROCHLORIDE 10 MILLIGRAM(S): 5 TABLET ORAL at 19:53

## 2024-05-20 RX ADMIN — AMLODIPINE BESYLATE 5 MILLIGRAM(S): 2.5 TABLET ORAL at 05:12

## 2024-05-20 RX ADMIN — OXYCODONE HYDROCHLORIDE 10 MILLIGRAM(S): 5 TABLET ORAL at 23:21

## 2024-05-20 RX ADMIN — POLYETHYLENE GLYCOL 3350 17 GRAM(S): 17 POWDER, FOR SOLUTION ORAL at 17:23

## 2024-05-20 RX ADMIN — HYDROMORPHONE HYDROCHLORIDE 0.5 MILLIGRAM(S): 2 INJECTION INTRAMUSCULAR; INTRAVENOUS; SUBCUTANEOUS at 17:18

## 2024-05-20 RX ADMIN — Medication 3 MILLIGRAM(S): at 23:07

## 2024-05-20 RX ADMIN — Medication 3 MILLILITER(S): at 17:19

## 2024-05-20 RX ADMIN — HEPARIN SODIUM 1100 UNIT(S)/HR: 5000 INJECTION INTRAVENOUS; SUBCUTANEOUS at 16:13

## 2024-05-20 RX ADMIN — HEPARIN SODIUM 11 UNIT(S)/HR: 5000 INJECTION INTRAVENOUS; SUBCUTANEOUS at 07:39

## 2024-05-20 RX ADMIN — HEPARIN SODIUM 5000 UNIT(S): 5000 INJECTION INTRAVENOUS; SUBCUTANEOUS at 16:29

## 2024-05-20 RX ADMIN — HEPARIN SODIUM 1100 UNIT(S)/HR: 5000 INJECTION INTRAVENOUS; SUBCUTANEOUS at 19:43

## 2024-05-20 RX ADMIN — BUDESONIDE AND FORMOTEROL FUMARATE DIHYDRATE 2 PUFF(S): 160; 4.5 AEROSOL RESPIRATORY (INHALATION) at 17:19

## 2024-05-20 RX ADMIN — CLOPIDOGREL BISULFATE 75 MILLIGRAM(S): 75 TABLET, FILM COATED ORAL at 14:44

## 2024-05-20 RX ADMIN — OXYCODONE HYDROCHLORIDE 5 MILLIGRAM(S): 5 TABLET ORAL at 14:42

## 2024-05-20 RX ADMIN — AMIODARONE HYDROCHLORIDE 200 MILLIGRAM(S): 400 TABLET ORAL at 05:12

## 2024-05-20 RX ADMIN — OXYCODONE HYDROCHLORIDE 10 MILLIGRAM(S): 5 TABLET ORAL at 23:51

## 2024-05-20 NOTE — PROGRESS NOTE ADULT - ATTENDING COMMENTS
given pt respiratory status will attempt to perform the procedure with spinal  will reschedule amputation  stop plavix in preparation for regional anesthesia  all above was discussed with the patient and her family

## 2024-05-20 NOTE — PRE-ANESTHESIA EVALUATION ADULT - SPO2 (%)
96
96
This is an established visit conducted via telemedicine. The patient has been instructed that this meets HIPAA criteria and acknowledges and agrees to this method of visitation. [place], [place] 
22/30/14 
7:18 AM 
Chief Complaint Patient presents with  Sleep Problem  
  requesting refill for [employer]  Urinary Odor  
  cloudy urine x 1 month  Cholesterol Problem 6 month follow up
92

## 2024-05-20 NOTE — PROGRESS NOTE ADULT - ASSESSMENT
82 y/o F with PMH of CAD (s/p stent), COPD on home o2, HTN, HLD, PAD, brain aneurysm. Presents with RLE pain, numbness. Plan for vascular intervention, possible amputation. Pulmonary called to consult for COPD.

## 2024-05-20 NOTE — PROGRESS NOTE ADULT - ASSESSMENT
83F h/o CAD (1 stent placed), COPD, HTN, HLD, PAF on amio and a/c , PAD s/p  right ilio-profunda and jump femoro-above knee popliteal bypass in summer 2018, brain aneurysm p/w 3d of RLE pain, numbness. States sxs started as b/l pain in soles of feet Monday night. Went to [facility] who dc'd with dx of plantar fasciitis. Today, R foot became numb and more painful. Also noted purple discoloration. Endorses rest pain, no wounds.       # PAD  - Pt with PAD to her RLE  - Planned for R AKA  - Pain control   - On Heparin gtt; Monitor Ptt and adjust as per normogram, monitor H/H  - Patient is medically optimized for OR. Patient is deemed to be an elevated risk candidate for surgical intervention.   - Per Vascular surgery     # CAD   - On Heparin gtt; Monitor Ptt and adjust as per normogram, monitor H/H  - On Plavix and Statin therapies  - BP control  - TTE as noted with Grade II DD   - Monitor on tele  - Cardio follow up     # TOBIAS  - Likely SCHUYLER  - Monitor urine output   - Check bladder scan  - Avoid nephrotoxic medication  - PO/Oral hydration   - Cr down-trending, continue to monitor and trend    # A fib  - On Amiodarone  - Rate control - monitor on tele; monitor electrolytes    - On AC with heparin gtt  - Cardio follow up     # COPD  - On Bronchodilators  - On O2 supplementation; Maintain O2 > 88%    - Pulm eval appreciated; F/u recs    Abnormal CT Chest  - Pt with reported PET/CT outpatient due to enlarged lymphadenopathy  - CT C here with severe upper lobe centrilobar emphysema, enlarged Mediastinal LN, subcarinal node, 1cm supraclavicular LN, Splenomegaly   - Consider in versus outpatient biopsy to rule out lymphoma.  Follow up closely with Pulmonology      PPX      Discussed with Attending and Pulm

## 2024-05-20 NOTE — PROGRESS NOTE ADULT - ASSESSMENT
83F pmhx of prior RLE bypass graft presenting with [place] 2 acute limb ischemia.     PLAN:   - NPO since midnight for R AKA  - 4AM labs labs including coags, T&S  - home meds restarted  - Hold hep gtt at 10am  - ctm vascular exam  - planned for AKA today  - consent signed in chart; preop 5/19    Vascular Surgery [id number]

## 2024-05-20 NOTE — PROGRESS NOTE ADULT - SUBJECTIVE AND OBJECTIVE BOX
Follow-up Pulm Progress Note    No new respiratory events overnight.  Denies SOB/CP.   Seen on 4LNC - sats 96% (pulse ox probe changed to earlobe)  o2 lowered to 3LNC     Medications:  MEDICATIONS  (STANDING):  albuterol/ipratropium for Nebulization 3 milliLiter(s) Nebulizer every 6 hours  aMIOdarone    Tablet 200 milliGRAM(s) Oral daily  amLODIPine   Tablet 5 milliGRAM(s) Oral daily  anagrelide 1 milliGRAM(s) Oral <User Schedule>  atorvastatin 40 milliGRAM(s) Oral at bedtime  budesonide 160 MICROgram(s)/formoterol 4.5 MICROgram(s) Inhaler 2 Puff(s) Inhalation two times a day  clopidogrel Tablet 75 milliGRAM(s) Oral daily  heparin  Infusion 1100 Unit(s)/Hr (11 mL/Hr) IV Continuous <Continuous>  melatonin 3 milliGRAM(s) Oral at bedtime  metoprolol succinate ER 25 milliGRAM(s) Oral daily  pantoprazole    Tablet 40 milliGRAM(s) Oral before breakfast  tiotropium 2.5 MICROgram(s) Inhaler 2 Puff(s) Inhalation daily  torsemide 20 milliGRAM(s) Oral daily    MEDICATIONS  (PRN):  acetaminophen     Tablet .. 975 milliGRAM(s) Oral every 6 hours PRN Mild Pain (1 - 3)  oxyCODONE    IR 2.5 milliGRAM(s) Oral every 4 hours PRN Moderate Pain (4 - 6)  oxyCODONE    IR 5 milliGRAM(s) Oral every 4 hours PRN Severe Pain (7 - 10)          Vital Signs Last 24 Hrs  T(C): 37.1 (20 May 2024 09:35), Max: 37.2 (20 May 2024 01:27)  T(F): 98.7 (20 May 2024 09:21), Max: 98.9 (20 May 2024 01:27)  HR: 60 (20 May 2024 09:35) (56 - 97)  BP: 156/70 (20 May 2024 09:35) (102/49 - 167/75)  BP(mean): 111 (20 May 2024 09:35) (111 - 111)  RR: 18 (20 May 2024 09:35) (18 - 20)  SpO2: 96% (20 May 2024 09:35) (89% - 98%)    Parameters below as of 20 May 2024 09:35    O2 Flow (L/min): 3        VBG pH 7.36 05-19 @ 07:25    VBG pCO2 51 05-19 @ 07:25    VBG O2 sat 99.1 05-19 @ 07:25    VBG lactate -- 05-19 @ 07:25      05-19 @ 07:01  -  05-20 @ 07:00  --------------------------------------------------------  IN: 984 mL / OUT: 350 mL / NET: 634 mL          LABS:                        12.9   8.18  )-----------( 205      ( 20 May 2024 04:47 )             42.2     05-20    140  |  103  |  33<H>  ----------------------------<  106<H>  3.5   |  26  |  1.38<H>    Ca    8.7      20 May 2024 04:47  Phos  3.6     05-20  Mg     2.0     05-20    TPro  7.6  /  Alb  3.4  /  TBili  0.5  /  DBili  x   /  AST  11  /  ALT  8<L>  /  AlkPhos  83  05-20            PT/INR - ( 20 May 2024 04:47 )   PT: 12.3 sec;   INR: 1.12 ratio         PTT - ( 20 May 2024 04:47 )  PTT:72.7 sec  Urinalysis Basic - ( 20 May 2024 04:47 )    Color: x / Appearance: x / SG: x / pH: x  Gluc: 106 mg/dL / Ketone: x  / Bili: x / Urobili: x   Blood: x / Protein: x / Nitrite: x   Leuk Esterase: x / RBC: x / WBC x   Sq Epi: x / Non Sq Epi: x / Bacteria: x              Physical Examination:  PULM: decreased BS no wheezing   CVS: S1, S2 heard    RADIOLOGY REVIEWED    CT chest:  < from: CT Chest No Cont (05.19.24 @ 12:08) >  FINDINGS:    LUNGS AND AIRWAYS: Patent central airways.  Severe upper lobe predominant   centrilobular emphysema. Bibasilar dependent atelectasis.  PLEURA: Small left pleural effusion.  MEDIASTINUM AND MAIN: Enlarged mediastinal lymph nodes including a   subcarinal lymph node measures 2.5 cm short axis in AP window lymph node   measures up to 1.3 cm short axis.  VESSELS: Coronary artery and aortic atherosclerosis.4 cm pulmonary artery   calcium seen in pulmonary hypertension.  HEART: Heart is enlarged No pericardial effusion. Aortic valve   calcifications.  CHEST WALL AND LOWER NECK: 1 cm left supraclavicular lymph node.  VISUALIZED UPPER ABDOMEN: Similar splenomegaly. Cholelithiasis.  BONES: Within normal limits.    IMPRESSION:  Indeterminate mediastinal and supraclavicular adenopathy    < end of copied text >

## 2024-05-20 NOTE — PROGRESS NOTE ADULT - SUBJECTIVE AND OBJECTIVE BOX
Subjective: Patient seen and examined. No new events except as noted.     SUBJECTIVE/ROS:  no new events       MEDICATIONS:  MEDICATIONS  (STANDING):  albuterol/ipratropium for Nebulization 3 milliLiter(s) Nebulizer every 6 hours  aMIOdarone    Tablet 200 milliGRAM(s) Oral daily  amLODIPine   Tablet 5 milliGRAM(s) Oral daily  anagrelide 1 milliGRAM(s) Oral <User Schedule>  atorvastatin 40 milliGRAM(s) Oral at bedtime  budesonide 160 MICROgram(s)/formoterol 4.5 MICROgram(s) Inhaler 2 Puff(s) Inhalation two times a day  clopidogrel Tablet 75 milliGRAM(s) Oral daily  heparin  Infusion 1100 Unit(s)/Hr (11 mL/Hr) IV Continuous <Continuous>  melatonin 3 milliGRAM(s) Oral at bedtime  metoprolol succinate ER 25 milliGRAM(s) Oral daily  pantoprazole    Tablet 40 milliGRAM(s) Oral before breakfast  tiotropium 2.5 MICROgram(s) Inhaler 2 Puff(s) Inhalation daily  torsemide 20 milliGRAM(s) Oral daily      PHYSICAL EXAM:  T(C): 37.1 (05-20-24 @ 05:10), Max: 37.2 (05-20-24 @ 01:27)  HR: 66 (05-20-24 @ 05:10) (56 - 97)  BP: 154/81 (05-20-24 @ 05:10) (102/49 - 167/75)  RR: 18 (05-20-24 @ 05:10) (18 - 20)  SpO2: 91% (05-20-24 @ 05:10) (89% - 98%)  Wt(kg): --  I&O's Summary    19 May 2024 07:01  -  20 May 2024 07:00  --------------------------------------------------------  IN: 984 mL / OUT: 350 mL / NET: 634 mL            JVP: Normal  Neck: supple  Lung: clear   CV: S1 S2 , Murmur:  Abd: soft  Ext: No edema  neuro: Awake / alert  Psych: flat affect  Skin: normal``    LABS/DATA:    CARDIAC MARKERS:                                12.9   8.18  )-----------( 205      ( 20 May 2024 04:47 )             42.2     05-20    140  |  103  |  33<H>  ----------------------------<  106<H>  3.5   |  26  |  1.38<H>    Ca    8.7      20 May 2024 04:47  Phos  3.6     05-20  Mg     2.0     05-20    TPro  7.6  /  Alb  3.4  /  TBili  0.5  /  DBili  x   /  AST  11  /  ALT  8<L>  /  AlkPhos  83  05-20    proBNP:   Lipid Profile:   HgA1c:   TSH:     TELE:  EKG:

## 2024-05-20 NOTE — PROGRESS NOTE ADULT - SUBJECTIVE AND OBJECTIVE BOX
SURGERY DAILY PROGRESS NOTE    SUBJECTIVE:  Patient seen and examined at bedside during morning rounds. No overnight events. Patient feeling well. NPO for OR today.    Vital Signs Last 24 Hrs  T(C): 37.1 (20 May 2024 05:10), Max: 37.2 (20 May 2024 01:27)  T(F): 98.7 (20 May 2024 05:10), Max: 98.9 (20 May 2024 01:27)  HR: 66 (20 May 2024 05:10) (56 - 97)  BP: 154/81 (20 May 2024 05:10) (102/49 - 167/75)  BP(mean): --  RR: 18 (20 May 2024 05:10) (18 - 20)  SpO2: 91% (20 May 2024 05:10) (89% - 98%)    Parameters below as of 20 May 2024 05:10  Patient On (Oxygen Delivery Method): nasal cannula  O2 Flow (L/min): 3      I&Os    05-19-24 @ 07:01  -  05-20-24 @ 07:00  --------------------------------------------------------  IN: 984 mL / OUT: 350 mL / NET: 634 mL        PHYSICAL EXAM:  GENERAL: NAD, lying in bed comfortably   CHEST: nonlabored, no increased WOB  ABDOMEN: Soft, Nontender, Nondistended.  EXTREMITIES: RLE discoloration, motor intact, decreased sensation. No dopplerable DP/PT signals    MEDICATIONS:  acetaminophen     Tablet .. 975 milliGRAM(s) Oral every 6 hours PRN  albuterol/ipratropium for Nebulization 3 milliLiter(s) Nebulizer every 6 hours  aMIOdarone    Tablet 200 milliGRAM(s) Oral daily  amLODIPine   Tablet 5 milliGRAM(s) Oral daily  anagrelide 1 milliGRAM(s) Oral <User Schedule>  atorvastatin 40 milliGRAM(s) Oral at bedtime  budesonide 160 MICROgram(s)/formoterol 4.5 MICROgram(s) Inhaler 2 Puff(s) Inhalation two times a day  clopidogrel Tablet 75 milliGRAM(s) Oral daily  heparin  Infusion 1100 Unit(s)/Hr IV Continuous <Continuous>  melatonin 3 milliGRAM(s) Oral at bedtime  metoprolol succinate ER 25 milliGRAM(s) Oral daily  oxyCODONE    IR 2.5 milliGRAM(s) Oral every 4 hours PRN  oxyCODONE    IR 5 milliGRAM(s) Oral every 4 hours PRN  pantoprazole    Tablet 40 milliGRAM(s) Oral before breakfast  tiotropium 2.5 MICROgram(s) Inhaler 2 Puff(s) Inhalation daily  torsemide 20 milliGRAM(s) Oral daily      LABS:                        12.9   8.18  )-----------( 205      ( 20 May 2024 04:47 )             42.2     05-20    140  |  103  |  33<H>  ----------------------------<  106<H>  3.5   |  26  |  1.38<H>    Ca    8.7      20 May 2024 04:47  Phos  3.6     05-20  Mg     2.0     05-20    TPro  7.6  /  Alb  3.4  /  TBili  0.5  /  DBili  x   /  AST  11  /  ALT  8<L>  /  AlkPhos  83  05-20    PT/INR - ( 20 May 2024 04:47 )   PT: 12.3 sec;   INR: 1.12 ratio         PTT - ( 20 May 2024 04:47 )  PTT:72.7 sec  Urinalysis Basic - ( 20 May 2024 04:47 )    Color: x / Appearance: x / SG: x / pH: x  Gluc: 106 mg/dL / Ketone: x  / Bili: x / Urobili: x   Blood: x / Protein: x / Nitrite: x   Leuk Esterase: x / RBC: x / WBC x   Sq Epi: x / Non Sq Epi: x / Bacteria: x        RADIOLOGY & ADDITIONAL STUDIES:

## 2024-05-20 NOTE — PROGRESS NOTE ADULT - SUBJECTIVE AND OBJECTIVE BOX
Name of Patient : ANGIE LOPES  MRN: [id number]  Date of visit: 05-20-24        Subjective: Patient seen and examined. No new events except as noted.   Patient seen earlier this AM. For OR today. On 3 L NC. With LE discomfort   Denies chest pain or palpitations    REVIEW OF SYSTEMS:    CONSTITUTIONAL: Generalized weakness; LE pain   EYES/ENT: No visual changes;  No vertigo or throat pain   NECK: No pain or stiffness  RESPIRATORY: No cough, wheezing, hemoptysis; No shortness of breath  CARDIOVASCULAR: No chest pain or palpitations  GASTROINTESTINAL: No abdominal or epigastric pain. No nausea, vomiting, or hematemesis; No diarrhea or constipation. No melena or hematochezia.  GENITOURINARY: No dysuria, frequency or hematuria  NEUROLOGICAL: No numbness or weakness  SKIN: No itching, burning, rashes, or lesions   All other review of systems is negative unless indicated above.    MEDICATIONS:  MEDICATIONS  (STANDING):  albuterol/ipratropium for Nebulization 3 milliLiter(s) Nebulizer every 6 hours  aMIOdarone    Tablet 200 milliGRAM(s) Oral daily  amLODIPine   Tablet 5 milliGRAM(s) Oral daily  aspirin enteric coated 81 milliGRAM(s) Oral daily  atorvastatin 40 milliGRAM(s) Oral at bedtime  budesonide 160 MICROgram(s)/formoterol 4.5 MICROgram(s) Inhaler 2 Puff(s) Inhalation two times a day  heparin  Infusion.  Unit(s)/Hr (11 mL/Hr) IV Continuous <Continuous>  melatonin 3 milliGRAM(s) Oral at bedtime  metoprolol succinate ER 25 milliGRAM(s) Oral daily  pantoprazole    Tablet 40 milliGRAM(s) Oral before breakfast  tiotropium 2.5 MICROgram(s) Inhaler 2 Puff(s) Inhalation daily  torsemide 20 milliGRAM(s) Oral daily      PHYSICAL EXAM:  T(C): 36.7 (05-20-24 @ 15:19), Max: 37.2 (05-20-24 @ 01:27)  HR: 72 (05-20-24 @ 15:19) (60 - 97)  BP: 149/78 (05-20-24 @ 15:19) (111/74 - 167/75)  RR: 18 (05-20-24 @ 15:19) (18 - 20)  SpO2: 93% (05-20-24 @ 15:19) (89% - 98%)  Wt(kg): --  I&O's Summary    19 May 2024 07:01  -  20 May 2024 07:00  --------------------------------------------------------  IN: 984 mL / OUT: 350 mL / NET: 634 mL    20 May 2024 07:01  -  20 May 2024 17:01  --------------------------------------------------------  IN: 0 mL / OUT: 450 mL / NET: -450 mL      Height (cm): 162.6 (05-20 @ 14:40)  Weight (kg): 61.371 (05-20 @ 14:40)  BMI (kg/m2): 23.2 (05-20 @ 14:40)  BSA (m2): 1.66 (05-20 @ 14:40)    Appearance: Awake, Sitting up in bed 	  HEENT:  Eyes are open   Lymphatic: No lymphadenopathy grossly   Cardiovascular: Normal    Respiratory: 3L NC: Decreased BS   Gastrointestinal:  Soft, Non-tender  Skin: No rashes,  warm to touch  Psychiatry:  Mood & affect appropriate  Musculoskeletal: No edema        05-19-24 @ 07:01  -  05-20-24 @ 07:00  --------------------------------------------------------  IN: 984 mL / OUT: 350 mL / NET: 634 mL    05-20-24 @ 07:01  -  05-20-24 @ 17:01  --------------------------------------------------------  IN: 0 mL / OUT: 450 mL / NET: -450 mL                              12.9   8.18  )-----------( 205      ( 20 May 2024 04:47 )             42.2               05-20    140  |  103  |  33<H>  ----------------------------<  106<H>  3.5   |  26  |  1.38<H>    Ca    8.7      20 May 2024 04:47  Phos  3.6     05-20  Mg     2.0     05-20    TPro  7.6  /  Alb  3.4  /  TBili  0.5  /  DBili  x   /  AST  11  /  ALT  8<L>  /  AlkPhos  83  05-20    PT/INR - ( 20 May 2024 04:47 )   PT: 12.3 sec;   INR: 1.12 ratio         PTT - ( 20 May 2024 04:47 )  PTT:72.7 sec                   Urinalysis Basic - ( 20 May 2024 04:47 )    Color: x / Appearance: x / SG: x / pH: x  Gluc: 106 mg/dL / Ketone: x  / Bili: x / Urobili: x   Blood: x / Protein: x / Nitrite: x   Leuk Esterase: x / RBC: x / WBC x   Sq Epi: x / Non Sq Epi: x / Bacteria: x          < from: CT Chest No Cont (05.19.24 @ 12:08) >    FINDINGS:    LUNGS AND AIRWAYS: Patent central airways.  Severe upper lobe predominant   centrilobular emphysema. Bibasilar dependent atelectasis.  PLEURA: Small left pleural effusion.  MEDIASTINUM AND MAIN: Enlarged mediastinal lymph nodes including a   subcarinal lymph node measures 2.5 cm short axis in AP window lymph node   measures up to 1.3 cm short axis.  VESSELS: Coronary artery and aortic atherosclerosis.4 cm pulmonary artery   calcium seen in pulmonary hypertension.  HEART: Heart is enlarged No pericardial effusion. Aortic valve   calcifications.  CHEST WALL AND LOWER NECK: 1 cm left supraclavicular lymph node.  VISUALIZED UPPER ABDOMEN: Similar splenomegaly. Cholelithiasis.  BONES: Within normal limits.    IMPRESSION:  Indeterminate mediastinal and supraclavicular adenopathy    < end of copied text >

## 2024-05-21 ENCOUNTER — TRANSCRIPTION ENCOUNTER (OUTPATIENT)
Age: 84
End: 2024-05-21

## 2024-05-21 DIAGNOSIS — J96.01 ACUTE RESPIRATORY FAILURE WITH HYPOXIA: ICD-10-CM

## 2024-05-21 LAB
ALBUMIN SERPL ELPH-MCNC: 3.5 G/DL — SIGNIFICANT CHANGE UP (ref 3.3–5)
ALP SERPL-CCNC: 83 U/L — SIGNIFICANT CHANGE UP (ref 40–120)
ALT FLD-CCNC: 8 U/L — LOW (ref 10–45)
ANION GAP SERPL CALC-SCNC: 14 MMOL/L — SIGNIFICANT CHANGE UP (ref 5–17)
ANION GAP SERPL CALC-SCNC: 14 MMOL/L — SIGNIFICANT CHANGE UP (ref 5–17)
APPEARANCE UR: CLEAR — SIGNIFICANT CHANGE UP
APTT BLD: 73.3 SEC — HIGH (ref 24.5–35.6)
AST SERPL-CCNC: 15 U/L — SIGNIFICANT CHANGE UP (ref 10–40)
BACTERIA # UR AUTO: NEGATIVE /HPF — SIGNIFICANT CHANGE UP
BASE EXCESS BLDV CALC-SCNC: 2.4 MMOL/L — SIGNIFICANT CHANGE UP (ref -2–3)
BILIRUB SERPL-MCNC: 0.7 MG/DL — SIGNIFICANT CHANGE UP (ref 0.2–1.2)
BILIRUB UR-MCNC: NEGATIVE — SIGNIFICANT CHANGE UP
BUN SERPL-MCNC: 30 MG/DL — HIGH (ref 7–23)
BUN SERPL-MCNC: 30 MG/DL — HIGH (ref 7–23)
CA-I SERPL-SCNC: 1.16 MMOL/L — SIGNIFICANT CHANGE UP (ref 1.15–1.33)
CALCIUM SERPL-MCNC: 8.9 MG/DL — SIGNIFICANT CHANGE UP (ref 8.4–10.5)
CALCIUM SERPL-MCNC: 9.2 MG/DL — SIGNIFICANT CHANGE UP (ref 8.4–10.5)
CAST: 4 /LPF — SIGNIFICANT CHANGE UP (ref 0–4)
CHLORIDE BLDV-SCNC: 99 MMOL/L — SIGNIFICANT CHANGE UP (ref 96–108)
CHLORIDE SERPL-SCNC: 101 MMOL/L — SIGNIFICANT CHANGE UP (ref 96–108)
CHLORIDE SERPL-SCNC: 97 MMOL/L — SIGNIFICANT CHANGE UP (ref 96–108)
CO2 BLDV-SCNC: 30 MMOL/L — HIGH (ref 22–26)
CO2 SERPL-SCNC: 24 MMOL/L — SIGNIFICANT CHANGE UP (ref 22–31)
CO2 SERPL-SCNC: 25 MMOL/L — SIGNIFICANT CHANGE UP (ref 22–31)
COLOR SPEC: YELLOW — SIGNIFICANT CHANGE UP
CREAT SERPL-MCNC: 1.37 MG/DL — HIGH (ref 0.5–1.3)
CREAT SERPL-MCNC: 1.44 MG/DL — HIGH (ref 0.5–1.3)
DIFF PNL FLD: NEGATIVE — SIGNIFICANT CHANGE UP
EGFR: 37 ML/MIN/1.73M2 — LOW
EGFR: 39 ML/MIN/1.73M2 — LOW
FLUAV AG NPH QL: SIGNIFICANT CHANGE UP
FLUBV AG NPH QL: SIGNIFICANT CHANGE UP
GAS PNL BLDA: SIGNIFICANT CHANGE UP
GAS PNL BLDV: 133 MMOL/L — LOW (ref 136–145)
GAS PNL BLDV: SIGNIFICANT CHANGE UP
GAS PNL BLDV: SIGNIFICANT CHANGE UP
GLUCOSE BLDV-MCNC: 102 MG/DL — HIGH (ref 70–99)
GLUCOSE SERPL-MCNC: 104 MG/DL — HIGH (ref 70–99)
GLUCOSE SERPL-MCNC: 106 MG/DL — HIGH (ref 70–99)
GLUCOSE UR QL: NEGATIVE MG/DL — SIGNIFICANT CHANGE UP
HCO3 BLDV-SCNC: 29 MMOL/L — SIGNIFICANT CHANGE UP (ref 22–29)
HCT VFR BLD CALC: 46.3 % — HIGH (ref 34.5–45)
HCT VFR BLD CALC: 46.5 % — HIGH (ref 34.5–45)
HCT VFR BLDA CALC: 40 % — SIGNIFICANT CHANGE UP (ref 34.5–46.5)
HGB BLD CALC-MCNC: 13.2 G/DL — SIGNIFICANT CHANGE UP (ref 11.7–16.1)
HGB BLD-MCNC: 13.4 G/DL — SIGNIFICANT CHANGE UP (ref 11.5–15.5)
HGB BLD-MCNC: 13.7 G/DL — SIGNIFICANT CHANGE UP (ref 11.5–15.5)
HOROWITZ INDEX BLDV+IHG-RTO: 100 — SIGNIFICANT CHANGE UP
KETONES UR-MCNC: NEGATIVE MG/DL — SIGNIFICANT CHANGE UP
LACTATE BLDV-MCNC: 1.3 MMOL/L — SIGNIFICANT CHANGE UP (ref 0.5–2)
LEUKOCYTE ESTERASE UR-ACNC: ABNORMAL
MAGNESIUM SERPL-MCNC: 2 MG/DL — SIGNIFICANT CHANGE UP (ref 1.6–2.6)
MAGNESIUM SERPL-MCNC: 2.1 MG/DL — SIGNIFICANT CHANGE UP (ref 1.6–2.6)
MCHC RBC-ENTMCNC: 23.2 PG — LOW (ref 27–34)
MCHC RBC-ENTMCNC: 23.2 PG — LOW (ref 27–34)
MCHC RBC-ENTMCNC: 28.9 GM/DL — LOW (ref 32–36)
MCHC RBC-ENTMCNC: 29.5 GM/DL — LOW (ref 32–36)
MCV RBC AUTO: 78.8 FL — LOW (ref 80–100)
MCV RBC AUTO: 80.2 FL — SIGNIFICANT CHANGE UP (ref 80–100)
NITRITE UR-MCNC: NEGATIVE — SIGNIFICANT CHANGE UP
NRBC # BLD: 0 /100 WBCS — SIGNIFICANT CHANGE UP (ref 0–0)
NRBC # BLD: 0 /100 WBCS — SIGNIFICANT CHANGE UP (ref 0–0)
PCO2 BLDV: 51 MMHG — HIGH (ref 39–42)
PH BLDV: 7.36 — SIGNIFICANT CHANGE UP (ref 7.32–7.43)
PH UR: 5.5 — SIGNIFICANT CHANGE UP (ref 5–8)
PHOSPHATE SERPL-MCNC: 4.2 MG/DL — SIGNIFICANT CHANGE UP (ref 2.5–4.5)
PHOSPHATE SERPL-MCNC: 4.3 MG/DL — SIGNIFICANT CHANGE UP (ref 2.5–4.5)
PLATELET # BLD AUTO: 226 K/UL — SIGNIFICANT CHANGE UP (ref 150–400)
PLATELET # BLD AUTO: 237 K/UL — SIGNIFICANT CHANGE UP (ref 150–400)
PO2 BLDV: 48 MMHG — HIGH (ref 25–45)
POTASSIUM BLDV-SCNC: 4.3 MMOL/L — SIGNIFICANT CHANGE UP (ref 3.5–5.1)
POTASSIUM SERPL-MCNC: 3.9 MMOL/L — SIGNIFICANT CHANGE UP (ref 3.5–5.3)
POTASSIUM SERPL-MCNC: 4.8 MMOL/L — SIGNIFICANT CHANGE UP (ref 3.5–5.3)
POTASSIUM SERPL-SCNC: 3.9 MMOL/L — SIGNIFICANT CHANGE UP (ref 3.5–5.3)
POTASSIUM SERPL-SCNC: 4.8 MMOL/L — SIGNIFICANT CHANGE UP (ref 3.5–5.3)
PROT SERPL-MCNC: 8 G/DL — SIGNIFICANT CHANGE UP (ref 6–8.3)
PROT UR-MCNC: 100 MG/DL
RBC # BLD: 5.77 M/UL — HIGH (ref 3.8–5.2)
RBC # BLD: 5.9 M/UL — HIGH (ref 3.8–5.2)
RBC # FLD: 21.1 % — HIGH (ref 10.3–14.5)
RBC # FLD: 21.2 % — HIGH (ref 10.3–14.5)
RBC CASTS # UR COMP ASSIST: 1 /HPF — SIGNIFICANT CHANGE UP (ref 0–4)
REVIEW: SIGNIFICANT CHANGE UP
RSV RNA NPH QL NAA+NON-PROBE: SIGNIFICANT CHANGE UP
SAO2 % BLDV: 77.1 % — SIGNIFICANT CHANGE UP (ref 67–88)
SARS-COV-2 RNA SPEC QL NAA+PROBE: SIGNIFICANT CHANGE UP
SODIUM SERPL-SCNC: 136 MMOL/L — SIGNIFICANT CHANGE UP (ref 135–145)
SODIUM SERPL-SCNC: 139 MMOL/L — SIGNIFICANT CHANGE UP (ref 135–145)
SP GR SPEC: 1.01 — SIGNIFICANT CHANGE UP (ref 1–1.03)
SQUAMOUS # UR AUTO: 2 /HPF — SIGNIFICANT CHANGE UP (ref 0–5)
UROBILINOGEN FLD QL: 0.2 MG/DL — SIGNIFICANT CHANGE UP (ref 0.2–1)
WBC # BLD: 9.35 K/UL — SIGNIFICANT CHANGE UP (ref 3.8–10.5)
WBC # BLD: 9.96 K/UL — SIGNIFICANT CHANGE UP (ref 3.8–10.5)
WBC # FLD AUTO: 9.35 K/UL — SIGNIFICANT CHANGE UP (ref 3.8–10.5)
WBC # FLD AUTO: 9.96 K/UL — SIGNIFICANT CHANGE UP (ref 3.8–10.5)
WBC UR QL: 6 /HPF — HIGH (ref 0–5)

## 2024-05-21 PROCEDURE — 99232 SBSQ HOSP IP/OBS MODERATE 35: CPT

## 2024-05-21 PROCEDURE — 93010 ELECTROCARDIOGRAM REPORT: CPT

## 2024-05-21 PROCEDURE — 71045 X-RAY EXAM CHEST 1 VIEW: CPT | Mod: 26

## 2024-05-21 PROCEDURE — 99223 1ST HOSP IP/OBS HIGH 75: CPT | Mod: GC

## 2024-05-21 RX ORDER — IPRATROPIUM/ALBUTEROL SULFATE 18-103MCG
3 AEROSOL WITH ADAPTER (GRAM) INHALATION ONCE
Refills: 0 | Status: DISCONTINUED | OUTPATIENT
Start: 2024-05-21 | End: 2024-05-21

## 2024-05-21 RX ORDER — MAGNESIUM SULFATE 500 MG/ML
2 VIAL (ML) INJECTION ONCE
Refills: 0 | Status: COMPLETED | OUTPATIENT
Start: 2024-05-21 | End: 2024-05-21

## 2024-05-21 RX ORDER — CHLORHEXIDINE GLUCONATE 213 G/1000ML
1 SOLUTION TOPICAL
Refills: 0 | Status: DISCONTINUED | OUTPATIENT
Start: 2024-05-21 | End: 2024-05-25

## 2024-05-21 RX ORDER — HEPARIN SODIUM 5000 [USP'U]/ML
11 INJECTION INTRAVENOUS; SUBCUTANEOUS
Qty: 25000 | Refills: 0 | Status: DISCONTINUED | OUTPATIENT
Start: 2024-05-21 | End: 2024-05-22

## 2024-05-21 RX ORDER — ONDANSETRON 8 MG/1
4 TABLET, FILM COATED ORAL ONCE
Refills: 0 | Status: COMPLETED | OUTPATIENT
Start: 2024-05-21 | End: 2024-05-21

## 2024-05-21 RX ADMIN — ATORVASTATIN CALCIUM 40 MILLIGRAM(S): 80 TABLET, FILM COATED ORAL at 21:06

## 2024-05-21 RX ADMIN — Medication 20 MILLIGRAM(S): at 05:12

## 2024-05-21 RX ADMIN — OXYCODONE HYDROCHLORIDE 5 MILLIGRAM(S): 5 TABLET ORAL at 21:07

## 2024-05-21 RX ADMIN — PANTOPRAZOLE SODIUM 40 MILLIGRAM(S): 20 TABLET, DELAYED RELEASE ORAL at 05:12

## 2024-05-21 RX ADMIN — Medication 975 MILLIGRAM(S): at 13:00

## 2024-05-21 RX ADMIN — HEPARIN SODIUM 1100 UNIT(S)/HR: 5000 INJECTION INTRAVENOUS; SUBCUTANEOUS at 13:30

## 2024-05-21 RX ADMIN — OXYCODONE HYDROCHLORIDE 10 MILLIGRAM(S): 5 TABLET ORAL at 12:00

## 2024-05-21 RX ADMIN — ONDANSETRON 4 MILLIGRAM(S): 8 TABLET, FILM COATED ORAL at 02:26

## 2024-05-21 RX ADMIN — TIOTROPIUM BROMIDE 2 PUFF(S): 18 CAPSULE ORAL; RESPIRATORY (INHALATION) at 11:39

## 2024-05-21 RX ADMIN — AMLODIPINE BESYLATE 5 MILLIGRAM(S): 2.5 TABLET ORAL at 05:13

## 2024-05-21 RX ADMIN — Medication 975 MILLIGRAM(S): at 12:13

## 2024-05-21 RX ADMIN — OXYCODONE HYDROCHLORIDE 10 MILLIGRAM(S): 5 TABLET ORAL at 11:35

## 2024-05-21 RX ADMIN — Medication 3 MILLILITER(S): at 17:01

## 2024-05-21 RX ADMIN — Medication 3 MILLILITER(S): at 05:12

## 2024-05-21 RX ADMIN — Medication 25 MILLIGRAM(S): at 05:12

## 2024-05-21 RX ADMIN — Medication 3 MILLILITER(S): at 23:19

## 2024-05-21 RX ADMIN — SENNA PLUS 2 TABLET(S): 8.6 TABLET ORAL at 21:06

## 2024-05-21 RX ADMIN — AMIODARONE HYDROCHLORIDE 200 MILLIGRAM(S): 400 TABLET ORAL at 05:13

## 2024-05-21 RX ADMIN — BUDESONIDE AND FORMOTEROL FUMARATE DIHYDRATE 2 PUFF(S): 160; 4.5 AEROSOL RESPIRATORY (INHALATION) at 17:23

## 2024-05-21 RX ADMIN — OXYCODONE HYDROCHLORIDE 5 MILLIGRAM(S): 5 TABLET ORAL at 22:07

## 2024-05-21 RX ADMIN — OXYCODONE HYDROCHLORIDE 10 MILLIGRAM(S): 5 TABLET ORAL at 03:04

## 2024-05-21 RX ADMIN — Medication 81 MILLIGRAM(S): at 11:39

## 2024-05-21 RX ADMIN — Medication 3 MILLIGRAM(S): at 21:07

## 2024-05-21 RX ADMIN — OXYCODONE HYDROCHLORIDE 10 MILLIGRAM(S): 5 TABLET ORAL at 02:34

## 2024-05-21 RX ADMIN — Medication 3 MILLILITER(S): at 12:13

## 2024-05-21 RX ADMIN — HEPARIN SODIUM 11 UNIT(S)/HR: 5000 INJECTION INTRAVENOUS; SUBCUTANEOUS at 20:02

## 2024-05-21 RX ADMIN — Medication 125 MILLIGRAM(S): at 12:29

## 2024-05-21 RX ADMIN — Medication 20 MILLIGRAM(S): at 20:02

## 2024-05-21 RX ADMIN — Medication 150 GRAM(S): at 12:27

## 2024-05-21 RX ADMIN — BUDESONIDE AND FORMOTEROL FUMARATE DIHYDRATE 2 PUFF(S): 160; 4.5 AEROSOL RESPIRATORY (INHALATION) at 05:12

## 2024-05-21 NOTE — CONSULT NOTE ADULT - SUBJECTIVE AND OBJECTIVE BOX
SICU Consultation Note  =====================================================  Patient is a 83y old  Female who presents with a chief complaint of R leg pain    HPI: 83F h/o CAD (1 stent placed), COPD, HTN, HLD, PAD s/p  right ilio-profunda and jump femoro-above knee popliteal bypass in summer 2018, brain aneurysm p/w 3d of RLE pain, numbness. States sxs started as b/l pain in soles of feet Monday night. Went to [facility] who dc'd with dx of plantar fasciitis. Today, R foot became numb and more painful. Also noted purple discoloration. Endorses rest pain, no wounds.       PAST MEDICAL & SURGICAL HISTORY:  CAD (coronary artery disease)      History of COPD      PVD (peripheral vascular disease)        FAMILY HISTORY:    [X] Family history not pertinent as reviewed with the patient and family    SOCIAL HISTORY:      ALLERGIES: No Known Allergies      ROS: 10-system review is otherwise negative except HPI above.      T(C): 36.9 (05-16-24 @ 17:20), Max: 36.9 (05-16-24 @ 17:20)  HR: 66 (05-16-24 @ 17:20) (66 - 66)  BP: 169/90 (05-16-24 @ 17:20) (169/90 - 169/90)  RR: 20 (05-16-24 @ 17:20) (20 - 20)  SpO2: 92% (05-16-24 @ 17:20) (92% - 92%)    PHYSICAL EXAM:  Constitutional: Well developed, well nourished, NAD  Pulmonary: Symmetric chest rise bilaterally, no increased WOB  Gastrointestinal: Abdomen soft, nontender, nondistended  Extremities: RLE discoloration, motor intact, decreased sensation. No dopplerable DP/PT signals    OBJECTIVE  No Known Allergies    I&O's Summary    I&O's Detail    LABS                        14.3   10.58 )-----------( 232      ( 16 May 2024 18:35 )             47.2     05-16    140  |  104  |  32<H>  ----------------------------<  97  3.7   |  22  |  1.34<H>    Ca    8.8      16 May 2024 18:35    TPro  8.5<H>  /  Alb  3.5  /  TBili  0.7  /  DBili  x   /  AST  14  /  ALT  9<L>  /  AlkPhos  93  05-16    PT/INR - ( 16 May 2024 18:35 )   PT: 14.6 sec;   INR: 1.40 ratio         PTT - ( 16 May 2024 18:35 )  PTT:32.5 sec  Urinalysis Basic - ( 16 May 2024 18:35 )    Color: x / Appearance: x / SG: x / pH: x  Gluc: 97 mg/dL / Ketone: x  / Bili: x / Urobili: x   Blood: x / Protein: x / Nitrite: x   Leuk Esterase: x / RBC: x / WBC x   Sq Epi: x / Non Sq Epi: x / Bacteria: x          IMAGING   (16 May 2024 19:56)      Surgery Information  OR time:      EBL:          IV Fluids:       Blood Products:   UOP:          PAST MEDICAL & SURGICAL HISTORY:  CAD (coronary artery disease)      History of COPD      PVD (peripheral vascular disease)        Home Meds: Home Medications:  Albuterol (Eqv-Ventolin HFA) 90 mcg/inh inhalation aerosol: 2 puff(s) inhaled 2 times a day as needed for  shortness of breath and/or wheezing (16 May 2024 20:30)  amiodarone 100 mg oral tablet: 1 tab(s) orally once a day (16 May 2024 20:29)  amLODIPine 5 mg oral tablet: 1 tab(s) orally once a day (16 May 2024 20:30)  anagrelide 0.5 mg oral capsule: 1 cap(s) orally 2 times a day (16 May 2024 20:30)  atorvastatin 40 mg oral tablet: 1 tab(s) orally once a day (16 May 2024 20:30)  Eliquis 2.5 mg oral tablet: 1 tab(s) orally 2 times a day (16 May 2024 20:30)  Plavix 75 mg oral tablet: 1 tab(s) orally (16 May 2024 20:30)  Toprol-XL 25 mg oral tablet, extended release: 1 tab(s) orally once a day (16 May 2024 20:30)  torsemide 20 mg oral tablet: 1 tab(s) orally once a day (16 May 2024 20:30)  umeclidinium-vilanterol 62.5 mcg-25 mcg/inh inhalation powder: 1 puff(s) inhaled once a day (16 May 2024 20:30)    Allergies: Allergies    No Known Allergies    Intolerances      Soc:   Advanced Directives: Presumed Full Code     ROS:    REVIEW OF SYSTEMS    [ ] A ten-point review of systems was otherwise negative except as noted.  [ ] Due to altered mental status/intubation, subjective information were not able to be obtained from the patient. History was obtained, to the extent possible, from review of the chart and collateral sources of information.      CURRENT MEDICATIONS:   --------------------------------------------------------------------------------------  Neurologic Medications  acetaminophen     Tablet .. 975 milliGRAM(s) Oral every 6 hours PRN Mild Pain (1 - 3)  HYDROmorphone  Injectable 0.5 milliGRAM(s) IV Push every 4 hours PRN Severe Pain (7 - 10)  melatonin 3 milliGRAM(s) Oral at bedtime  oxyCODONE    IR 5 milliGRAM(s) Oral every 4 hours PRN Moderate Pain (4 - 6)  oxyCODONE    IR 10 milliGRAM(s) Oral every 4 hours PRN Severe Pain (7 - 10)    Respiratory Medications  albuterol/ipratropium for Nebulization 3 milliLiter(s) Nebulizer every 6 hours  albuterol/ipratropium for Nebulization. 3 milliLiter(s) Nebulizer once  albuterol/ipratropium for Nebulization. 3 milliLiter(s) Nebulizer once  albuterol/ipratropium for Nebulization. 3 milliLiter(s) Nebulizer once  budesonide 160 MICROgram(s)/formoterol 4.5 MICROgram(s) Inhaler 2 Puff(s) Inhalation two times a day  tiotropium 2.5 MICROgram(s) Inhaler 2 Puff(s) Inhalation daily    Cardiovascular Medications  aMIOdarone    Tablet 200 milliGRAM(s) Oral daily  amLODIPine   Tablet 5 milliGRAM(s) Oral daily  metoprolol succinate ER 25 milliGRAM(s) Oral daily  torsemide 20 milliGRAM(s) Oral daily    Gastrointestinal Medications  magnesium sulfate  IVPB 2 Gram(s) IV Intermittent once  pantoprazole    Tablet 40 milliGRAM(s) Oral before breakfast  polyethylene glycol 3350 17 Gram(s) Oral daily  senna 2 Tablet(s) Oral at bedtime    Genitourinary Medications    Hematologic/Oncologic Medications  aspirin enteric coated 81 milliGRAM(s) Oral daily  heparin  Infusion.  Unit(s)/Hr IV Continuous <Continuous>    Antimicrobial/Immunologic Medications    Endocrine/Metabolic Medications  atorvastatin 40 milliGRAM(s) Oral at bedtime  methylPREDNISolone sodium succinate Injectable 125 milliGRAM(s) IV Push once  methylPREDNISolone sodium succinate Injectable 20 milliGRAM(s) IV Push every 8 hours    Topical/Other Medications    --------------------------------------------------------------------------------------    VITAL SIGNS, INS/OUTS (last 24 hours):  --------------------------------------------------------------------------------------  ICU Vital Signs Last 24 Hrs  T(C): 38 (21 May 2024 12:00), Max: 38.2 (21 May 2024 09:51)  T(F): 100.4 (21 May 2024 12:00), Max: 100.7 (21 May 2024 09:51)  HR: 56 (21 May 2024 12:00) (56 - 72)  BP: 128/68 (21 May 2024 12:00) (128/68 - 163/73)  BP(mean): 92 (21 May 2024 12:00) (92 - 92)  ABP: --  ABP(mean): --  RR: 22 (21 May 2024 12:00) (18 - 22)  SpO2: 89% (21 May 2024 12:00) (85% - 98%)    O2 Parameters below as of 21 May 2024 12:00  Patient On (Oxygen Delivery Method): mask, nonrebreather          I&O's Summary    20 May 2024 07:01  -  21 May 2024 07:00  --------------------------------------------------------  IN: 712 mL / OUT: 1300 mL / NET: -588 mL    21 May 2024 07:01  -  21 May 2024 12:11  --------------------------------------------------------  IN: 180 mL / OUT: 0 mL / NET: 180 mL      --------------------------------------------------------------------------------------    EXAM:  General/Neuro  RASS: 0  GCS: 15  Exam: Normal, NAD, alert, oriented x 3, no focal deficits. PERRLA      Respiratory  Exam: Lungs clear to auscultation, Normal expansion/effort.      Cardiovascular  Exam: S1, S2.  Regular rate and rhythm.  Peripheral edema      GI  Exam: Abdomen soft, Non-tender, Non-distended.    Current Diet:  Regular     Tubes/Lines/Drains  ***  [x] Peripheral IV  [] Central Venous Line     	[] R	[] L	[] IJ	[] Fem	[] SC        Type:	    Date Placed:   [] Arterial Line		[] R	[] L	[] Fem	[] Rad	[] Ax	Date Placed:   [] PICC:         	[] Midline		[] Mediport           [] Urinary Catheter		Date Placed:     Extremities  RLE discoloration, motor intact, decreased sensation. No dopplerable DP/PT signals      LABS  --------------------------------------------------------------------------------------  Labs:  CAPILLARY BLOOD GLUCOSE                              13.7   9.96  )-----------( 237      ( 21 May 2024 11:01 )             46.5         05-21    136  |  97  |  30<H>  ----------------------------<  106<H>  3.9   |  25  |  1.37<H>      Calcium: 8.9 mg/dL (05-21-24 @ 11:01)      LFTs:             8.0  | 0.7  | 15       ------------------[83      ( 21 May 2024 07:24 )  3.5  | x    | 8           Lipase:x      Amylase:x         Blood Gas Arterial, Lactate: 1.2 mmol/L (05-21-24 @ 09:40)    ABG - ( 21 May 2024 09:40 )  pH: 7.37  /  pCO2: 48    /  pO2: 74    / HCO3: 28    / Base Excess: 1.7   /  SaO2: 94.8              Coags:     x      ----< x       ( 21 May 2024 07:35 )     73.3                Urinalysis Basic - ( 21 May 2024 11:01 )    Color: x / Appearance: x / SG: x / pH: x  Gluc: 106 mg/dL / Ketone: x  / Bili: x / Urobili: x   Blood: x / Protein: x / Nitrite: x   Leuk Esterase: x / RBC: x / WBC x   Sq Epi: x / Non Sq Epi: x / Bacteria: x          --------------------------------------------------------------------------------------

## 2024-05-21 NOTE — PROVIDER CONTACT NOTE (OTHER) - ASSESSMENT
Pt c/o 8/10 pain in the R leg reporting being woken up suddenly by pain and feeling nauseous. Pt threw up about 50cc. Pt c/o 8/10 pain in the R leg reporting being woken up suddenly by pain and feeling nauseous. Pt threw up about 50cc. VSS.

## 2024-05-21 NOTE — PROGRESS NOTE ADULT - NSPROGADDITIONALINFOA_GEN_ALL_CORE
discussed with Dr. Villalobos
discussed with vascular surgery PA, internal medicine PA and Dr. Villalobos.

## 2024-05-21 NOTE — PRE PROCEDURE NOTE - PRE PROCEDURE EVALUATION
Pre-Procedure Note    This is a 83y Female    Procedure:    Diagnosis/Indication: Patient is a 83y old  Female who presents with a chief complaint of     PAST MEDICAL & SURGICAL HISTORY:  CAD (coronary artery disease)      History of COPD      PVD (peripheral vascular disease)           Female    Allergies: No Known Allergies      LABS:  CBC Full  -  ( 19 May 2024 07:20 )  WBC Count : 7.80 K/uL  RBC Count : 5.53 M/uL  Hemoglobin : 12.8 g/dL  Hematocrit : 43.7 %  Platelet Count - Automated : 222 K/uL  Mean Cell Volume : 79.0 fl  Mean Cell Hemoglobin : 23.1 pg  Mean Cell Hemoglobin Concentration : 29.3 gm/dL  Auto Neutrophil # : x  Auto Lymphocyte # : x  Auto Monocyte # : x  Auto Eosinophil # : x  Auto Basophil # : x  Auto Neutrophil % : x  Auto Lymphocyte % : x  Auto Monocyte % : x  Auto Eosinophil % : x  Auto Basophil % : x    05-19    141  |  105  |  38<H>  ----------------------------<  86  3.8   |  24  |  1.78<H>    Ca    8.9      19 May 2024 07:20  Phos  4.3     05-19  Mg     2.1     05-19      PTT - ( 19 May 2024 07:20 )  PTT:94.8 sec    Plan:  - NPO @ midnight  - 4am labs including coags, T&S  - Hold hep gtt at 10am  - consented  - pending medical clearance by Dr. Stephens
Vascular Team Surgery Preop Note    Patient is a 83y old  Female who presents with a chief complaint of   Diagnosis: Acute limb ischemia  Procedure: R DANNA  Surgeon: Dr. Sarah                          13.7   9.96  )-----------( 237      ( 21 May 2024 11:01 )             46.5     05-21    136  |  97  |  30<H>  ----------------------------<  106<H>  3.9   |  25  |  1.37<H>    Ca    8.9      21 May 2024 11:01  Phos  4.2     05-21  Mg     2.0     05-21    TPro  8.0  /  Alb  3.5  /  TBili  0.7  /  DBili  x   /  AST  15  /  ALT  8<L>  /  AlkPhos  83  05-21    PT/INR - ( 20 May 2024 04:47 )   PT: 12.3 sec;   INR: 1.12 ratio         PTT - ( 21 May 2024 07:35 )  PTT:73.3 sec  Urinalysis Basic - ( 21 May 2024 11:01 )    Color: x / Appearance: x / SG: x / pH: x  Gluc: 106 mg/dL / Ketone: x  / Bili: x / Urobili: x   Blood: x / Protein: x / Nitrite: x   Leuk Esterase: x / RBC: x / WBC x   Sq Epi: x / Non Sq Epi: x / Bacteria: x        [x] Type & Screen  [x] CBC  [x] BMP  [x] PT/PTT/INR  [x] Urinalysis  [x] Chest X-ray  [x] NPO @ MN  [x] Consent  [x] Clearance

## 2024-05-21 NOTE — PROGRESS NOTE ADULT - SUBJECTIVE AND OBJECTIVE BOX
Name of Patient : ANGIE LOPES  MRN: [id number]  Date of visit: 05-21-24 @ 12:27      Subjective: Patient seen and examined. No new events except as noted.   Patient seen earlier this Am around 9:30 AM alongside Pulmonology team and RN. Patient did not undergo surgical intervention yesterday due to concern from general anesthesia.    Per pulm, patient with reports hypoxia to 60s on 4L NC. Pulm placed patient on NRB and O2 improved to 92-98%, ABG was obtain. Patient febrile to 100.7. Reports dry cough. Denies chest pain, palpitations, shortness of breath or dyspnea. Vascular surgery notified and patient pending SICU consult    REVIEW OF SYSTEMS:    CONSTITUTIONAL: Generalized weakness; Febrile   EYES/ENT: No visual changes;  No vertigo or throat pain   NECK: No pain or stiffness  RESPIRATORY: + Hypoxia; + Dry cough; No shortness of breath  CARDIOVASCULAR: No chest pain or palpitations  GASTROINTESTINAL: No abdominal or epigastric pain. No nausea, vomiting, or hematemesis; No diarrhea or constipation. No melena or hematochezia.  GENITOURINARY: No dysuria, frequency or hematuria  NEUROLOGICAL: No numbness or weakness  SKIN/ MSK: No itching, burning, rashes, or lesions   All other review of systems is negative unless indicated above.    MEDICATIONS:  MEDICATIONS  (STANDING):  albuterol/ipratropium for Nebulization 3 milliLiter(s) Nebulizer every 6 hours  albuterol/ipratropium for Nebulization. 3 milliLiter(s) Nebulizer once  albuterol/ipratropium for Nebulization. 3 milliLiter(s) Nebulizer once  albuterol/ipratropium for Nebulization. 3 milliLiter(s) Nebulizer once  aMIOdarone    Tablet 200 milliGRAM(s) Oral daily  amLODIPine   Tablet 5 milliGRAM(s) Oral daily  aspirin enteric coated 81 milliGRAM(s) Oral daily  atorvastatin 40 milliGRAM(s) Oral at bedtime  budesonide 160 MICROgram(s)/formoterol 4.5 MICROgram(s) Inhaler 2 Puff(s) Inhalation two times a day  heparin  Infusion.  Unit(s)/Hr (11 mL/Hr) IV Continuous <Continuous>  magnesium sulfate  IVPB 2 Gram(s) IV Intermittent once  melatonin 3 milliGRAM(s) Oral at bedtime  methylPREDNISolone sodium succinate Injectable 125 milliGRAM(s) IV Push once  methylPREDNISolone sodium succinate Injectable 20 milliGRAM(s) IV Push every 8 hours  metoprolol succinate ER 25 milliGRAM(s) Oral daily  pantoprazole    Tablet 40 milliGRAM(s) Oral before breakfast  polyethylene glycol 3350 17 Gram(s) Oral daily  senna 2 Tablet(s) Oral at bedtime  tiotropium 2.5 MICROgram(s) Inhaler 2 Puff(s) Inhalation daily  torsemide 20 milliGRAM(s) Oral daily      PHYSICAL EXAM:  T(C): 38 (05-21-24 @ 12:00), Max: 38.2 (05-21-24 @ 09:51)  HR: 56 (05-21-24 @ 12:00) (56 - 72)  BP: 128/68 (05-21-24 @ 12:00) (128/68 - 163/73)  RR: 22 (05-21-24 @ 12:00) (18 - 22)  SpO2: 89% (05-21-24 @ 12:00) (85% - 98%)  Wt(kg): --  I&O's Summary    20 May 2024 07:01  -  21 May 2024 07:00  --------------------------------------------------------  IN: 712 mL / OUT: 1300 mL / NET: -588 mL    21 May 2024 07:01  -  21 May 2024 12:27  --------------------------------------------------------  IN: 180 mL / OUT: 0 mL / NET: 180 mL      Height (cm): 162.6 (05-20 @ 14:40)  Weight (kg): 61.371 (05-20 @ 14:40)  BMI (kg/m2): 23.2 (05-20 @ 14:40)  BSA (m2): 1.66 (05-20 @ 14:40)    Appearance: Awake, Sitting up in bed, AxO X 4   HEENT:  Eyes are open; NRB   Lymphatic: No lymphadenopathy grossly   Cardiovascular: Normal   Respiratory: Decreased BS; No wheezing; On NRB   Gastrointestinal:  Soft, Non-tender  Skin: No rashes,  warm to touch  Psychiatry:  Mood & affect appropriate  Musculoskeletal: No edema          05-20-24 @ 07:01  -  05-21-24 @ 07:00  --------------------------------------------------------  IN: 712 mL / OUT: 1300 mL / NET: -588 mL    05-21-24 @ 07:01  -  05-21-24 @ 12:27  --------------------------------------------------------  IN: 180 mL / OUT: 0 mL / NET: 180 mL                                  13.7   9.96  )-----------( 237      ( 21 May 2024 11:01 )             46.5               05-21    136  |  97  |  30<H>  ----------------------------<  106<H>  3.9   |  25  |  1.37<H>    Ca    8.9      21 May 2024 11:01  Phos  4.2     05-21  Mg     2.0     05-21    TPro  8.0  /  Alb  3.5  /  TBili  0.7  /  DBili  x   /  AST  15  /  ALT  8<L>  /  AlkPhos  83  05-21    PT/INR - ( 20 May 2024 04:47 )   PT: 12.3 sec;   INR: 1.12 ratio         PTT - ( 21 May 2024 07:35 )  PTT:73.3 sec                 ABG - ( 21 May 2024 09:40 )  pH, Arterial: 7.37  pH, Blood: x     /  pCO2: 48    /  pO2: 74    / HCO3: 28    / Base Excess: 1.7   /  SaO2: 94.8              Urinalysis Basic - ( 21 May 2024 11:01 )    Color: x / Appearance: x / SG: x / pH: x  Gluc: 106 mg/dL / Ketone: x  / Bili: x / Urobili: x   Blood: x / Protein: x / Nitrite: x   Leuk Esterase: x / RBC: x / WBC x   Sq Epi: x / Non Sq Epi: x / Bacteria: x

## 2024-05-21 NOTE — PROGRESS NOTE ADULT - SUBJECTIVE AND OBJECTIVE BOX
VASCULAR SURGERY DAILY PROGRESS NOTE    24 Hour/Overnight Events: No acute events overnight    SUBJECTIVE: Patient seen and evaluated on AM rounds. Pt reports appropriate unchanged RLE pain, which is adequately controlled on current regimen  Pt passing gas, and having bowel movements. Tolerating diet. Ambulating well.   Denies fevers/chills, chest pain, dyspnea, abdominal pain, nausea, vomiting, and diarrhea    ------------------------------------------------------------------------------------------------------------  OBJECTIVE:  Vital Signs Last 24 Hrs  T(C): 36.8 (21 May 2024 04:48), Max: 37.1 (20 May 2024 09:21)  T(F): 98.2 (21 May 2024 04:48), Max: 98.8 (20 May 2024 13:18)  HR: 63 (21 May 2024 05:05) (57 - 72)  BP: 157/73 (21 May 2024 05:05) (134/62 - 163/73)  BP(mean): 111 (20 May 2024 12:09) (111 - 111)  RR: 18 (21 May 2024 04:48) (18 - 19)  SpO2: 85% (21 May 2024 04:48) (85% - 98%)    Parameters below as of 21 May 2024 04:48  Patient On (Oxygen Delivery Method): nasal cannula  O2 Flow (L/min): 4    I&O's Detail    20 May 2024 07:01  -  21 May 2024 07:00  --------------------------------------------------------  IN:    Heparin Infusion: 132 mL    Oral Fluid: 580 mL  Total IN: 712 mL    OUT:    Voided (mL): 1300 mL  Total OUT: 1300 mL    Total NET: -588 mL          LABS:                        13.4   9.35  )-----------( 226      ( 21 May 2024 07:30 )             46.3     05-21    139  |  101  |  30<H>  ----------------------------<  104<H>  4.8   |  24  |  1.44<H>    Ca    9.2      21 May 2024 07:24  Phos  4.3     05-21  Mg     2.1     05-21    TPro  8.0  /  Alb  3.5  /  TBili  0.7  /  DBili  x   /  AST  15  /  ALT  8<L>  /  AlkPhos  83  05-21    LIVER FUNCTIONS - ( 21 May 2024 07:24 )  Alb: 3.5 g/dL / Pro: 8.0 g/dL / ALK PHOS: 83 U/L / ALT: 8 U/L / AST: 15 U/L / GGT: x           PT/INR - ( 20 May 2024 04:47 )   PT: 12.3 sec;   INR: 1.12 ratio         PTT - ( 21 May 2024 07:35 )  PTT:73.3 sec  Urinalysis Basic - ( 21 May 2024 07:24 )    Color: x / Appearance: x / SG: x / pH: x  Gluc: 104 mg/dL / Ketone: x  / Bili: x / Urobili: x   Blood: x / Protein: x / Nitrite: x   Leuk Esterase: x / RBC: x / WBC x   Sq Epi: x / Non Sq Epi: x / Bacteria: x        PHYSICAL EXAM:  Constitutional: Well developed, well nourished, NAD  Pulmonary: Symmetric chest rise bilaterally, no increased WOB  Gastrointestinal: Abdomen soft, nontender, nondistended, appropriate sx incisional tenderness with dressings c/d/i. No rebound or guarding. NGT with bilious output. HERMAN drain w/ serosanguineous output. Ostomy pink with gas and stool in ostomy bag. Gravity bag with minimal bilious drainage. Midline wound vac holding suction.   Groin: Soft, nontender, no ecchymosis/hematoma, no erythema, no edema.  Extremities: (+) DP/PT pulses. Warm to touch, soft, normal strength, sensory and motor intact. No cyanosis/erythema/edema/hematoma VASCULAR SURGERY DAILY PROGRESS NOTE    24 Hour/Overnight Events: No acute events overnight    SUBJECTIVE: Patient seen and evaluated on AM rounds. Pt reports unchanged RLE pain, which is adequately controlled on current regimen. Denies fevers/chills, chest pain, dyspnea, abdominal pain, nausea, vomiting, and diarrhea    ------------------------------------------------------------------------------------------------------------  OBJECTIVE:  Vital Signs Last 24 Hrs  T(C): 36.8 (21 May 2024 04:48), Max: 37.1 (20 May 2024 09:21)  T(F): 98.2 (21 May 2024 04:48), Max: 98.8 (20 May 2024 13:18)  HR: 63 (21 May 2024 05:05) (57 - 72)  BP: 157/73 (21 May 2024 05:05) (134/62 - 163/73)  BP(mean): 111 (20 May 2024 12:09) (111 - 111)  RR: 18 (21 May 2024 04:48) (18 - 19)  SpO2: 85% (21 May 2024 04:48) (85% - 98%)    Parameters below as of 21 May 2024 04:48  Patient On (Oxygen Delivery Method): nasal cannula  O2 Flow (L/min): 4      I&O's Detail    20 May 2024 07:01  -  21 May 2024 07:00  --------------------------------------------------------  IN:    Heparin Infusion: 132 mL    Oral Fluid: 580 mL  Total IN: 712 mL    OUT:    Voided (mL): 1300 mL  Total OUT: 1300 mL    Total NET: -588 mL      LABS:                        13.4   9.35  )-----------( 226      ( 21 May 2024 07:30 )             46.3     05-21    139  |  101  |  30<H>  ----------------------------<  104<H>  4.8   |  24  |  1.44<H>    Ca    9.2      21 May 2024 07:24  Phos  4.3     05-21  Mg     2.1     05-21    TPro  8.0  /  Alb  3.5  /  TBili  0.7  /  DBili  x   /  AST  15  /  ALT  8<L>  /  AlkPhos  83  05-21    LIVER FUNCTIONS - ( 21 May 2024 07:24 )  Alb: 3.5 g/dL / Pro: 8.0 g/dL / ALK PHOS: 83 U/L / ALT: 8 U/L / AST: 15 U/L / GGT: x           PT/INR - ( 20 May 2024 04:47 )   PT: 12.3 sec;   INR: 1.12 ratio    PTT - ( 21 May 2024 07:35 )  PTT:73.3 sec    Urinalysis Basic - ( 21 May 2024 07:24 )  Color: x / Appearance: x / SG: x / pH: x  Gluc: 104 mg/dL / Ketone: x  / Bili: x / Urobili: x   Blood: x / Protein: x / Nitrite: x   Leuk Esterase: x / RBC: x / WBC x   Sq Epi: x / Non Sq Epi: x / Bacteria: x      PHYSICAL EXAM:  Constitutional: Well developed, well nourished, NAD  Pulmonary: Symmetric chest rise bilaterally, no increased WOB  Gastrointestinal: Abdomen soft, nontender, nondistended  Extremities: RLE discoloration, motor intact, decreased sensation. No dopplerable DP/PT signals

## 2024-05-21 NOTE — PROGRESS NOTE ADULT - ASSESSMENT
PAD  plan for amputation   fu with vascular surgery     CAD  s/p PCI   recent cath jan 2024 with patent stent  on asa  plavix on hold for surgery   on statin   on BB    PAF  on amio  avoid qt prolonging drugs  on a/c for now with heparin     Pre-Operative Cardiac Risk Stratification and Optimization   Based on patient history and physical exam, the patient is considered to have high risk   on optimal meds  recent cath jan 2024 without need for intervention   echo shows normal LV /RV function   appreciate pulm input , fu for optimization   can proceed to this time sensitive surgery with acceptable elevated risk   plan for spinal anesthesia as per primary team

## 2024-05-21 NOTE — PROGRESS NOTE ADULT - ASSESSMENT
PLAN:   - ERP Protocol  - Parisi removed; TOV at  - Diet:  - Monitor bowel function  - Serial abd exams   - Antibiotics  - Pain medications PRN  - Monitor NGT  - Monitor ostomy output - ostomy teaching  - Monitor HERMAN drain output- HERMAN drain teaching  - Monitor wound vac  - Monitor Parisi  - OOB as tolerated  - VTE ppx:  - PT recs:   - Discharge planning today    83F pmhx of prior RLE bypass graft presenting with Zac 2 acute limb ischemia. Right AKA cancelled on 5/20 due to desaturation    PLAN:   - Plan for Right AKA on Tues, 5/28     - Consent in chart      - Patient will receive spinal anesthesia; no general anesthesia  - Continue to monitor vascular exam  - Pain control with PO Tylenol/Oxy PRN  - Diet: Reg/DASH diet  - VTE ppx: Hep [facility]      Vascular Surgery   [id number]

## 2024-05-21 NOTE — CONSULT NOTE ADULT - ATTENDING COMMENTS
admitted to SICU for hypoxia. pt is asymptomatic and has COPD chronically    on exam lungs clear no resp distress  CXR non focal  no e/o volume overload  concern for sepsis induced resp dysfunction vs COPD flare vs less likely PE. discussed with primary team and plan for empiric COPD flare treatment

## 2024-05-21 NOTE — CONSULT NOTE ADULT - ASSESSMENT
83F h/o CAD (1 stent placed), COPD, HTN, HLD, PAD s/p  right ilio-profunda and jump femoro-above knee popliteal bypass in summer 2018, brain aneurysm p/w 3d of RLE pain, numbness. States sxs started as b/l pain in soles of feet Monday night. Went to [facility] who dc'd with dx of plantar fasciitis. Today, R foot became numb and more painful. Also noted purple discoloration. Endorses rest pain, no wounds.       PLAN:   Neurologic:   - AOx4  - Pain management Dilaudid + Oxycodone + Tylenol    Respiratory:   Cardiovascular:   - MAP goal > 65   - Duoneb  - Solumedrol IV  - Budesonide  - Tiotropoium 2.5MICROgm    Gastrointestinal/Nutrition:   - Regular diet  - Bowel regimen (Miralax, senna)  - Protonix 40 mg QD    Renal/Genitourinary:   - Monitor urinary output  - Trend electrolytes and replete    Hematologic:   - Heparin gtt   - Monitor aPTT (60-90)  - ASA 81mg    Infectious Disease:   Lines/Tubes:    Endocrine:   - Monitor glucose levels     Disposition: SICU     
84 y/o F with PMH of CAD (s/p stent), COPD on home o2, HTN, HLD, PAD, brain aneurysm. Presents with RLE pain, numbness. Plan for vascular intervention, possible amputation. Pulmonary called to consult for COPD.   
PAD  plan for amputation   fu with vascular surgery     CAD  s/p PCI   recent cath jan 2024 with patent stent  cont plavix  on statin   on BB    history of cardiomyopathy   cont BB  echo     PAF  on amio  avoid qt prolonging drugs  on a/c for now with heparin   obtain EKG     Pre-Operative Cardiac Risk Stratification and Optimization   Based on patient history and physical exam, the patient is considered to have high risk   on optimal meds  recent cath jan 2024 without need for intervention   obtain echo   obtain EKG   pulm consult ( called )     Thank you for allowing me to participate in the care of this patient.   Fidencio Roman MD, [place] [address]  [phone]        Advanced care planning was discussed with patient and family.  Risks, benefits and alternatives of the cardiac treatments and medical therapy including procedures were discussed in detail and all questions were answered. Importance of compliance with medical therapy and lifestyle modification to improve cardiovascular health were addressed. Appropriate forms and patient educational materials were reviewed. 30 minutes face to face spent.

## 2024-05-21 NOTE — PROGRESS NOTE ADULT - SUBJECTIVE AND OBJECTIVE BOX
Follow-up Pulm Progress Note    events noted - pt did not go to OR yesterday due to concerns with general anesthesia       pt seen on 4LNC with sats in 60s   placed on 100% NRB with o2 sats maintaining 92-98%   ABG drawn  rectal temp 100.7F  mild dry cough  denies SOB, CP, pleuritic CP        Medications:  MEDICATIONS  (STANDING):  albuterol/ipratropium for Nebulization 3 milliLiter(s) Nebulizer every 6 hours  aMIOdarone    Tablet 200 milliGRAM(s) Oral daily  amLODIPine   Tablet 5 milliGRAM(s) Oral daily  aspirin enteric coated 81 milliGRAM(s) Oral daily  atorvastatin 40 milliGRAM(s) Oral at bedtime  budesonide 160 MICROgram(s)/formoterol 4.5 MICROgram(s) Inhaler 2 Puff(s) Inhalation two times a day  heparin  Infusion.  Unit(s)/Hr (11 mL/Hr) IV Continuous <Continuous>  melatonin 3 milliGRAM(s) Oral at bedtime  metoprolol succinate ER 25 milliGRAM(s) Oral daily  pantoprazole    Tablet 40 milliGRAM(s) Oral before breakfast  polyethylene glycol 3350 17 Gram(s) Oral daily  senna 2 Tablet(s) Oral at bedtime  tiotropium 2.5 MICROgram(s) Inhaler 2 Puff(s) Inhalation daily  torsemide 20 milliGRAM(s) Oral daily    MEDICATIONS  (PRN):  acetaminophen     Tablet .. 975 milliGRAM(s) Oral every 6 hours PRN Mild Pain (1 - 3)  HYDROmorphone  Injectable 0.5 milliGRAM(s) IV Push every 4 hours PRN Severe Pain (7 - 10)  oxyCODONE    IR 5 milliGRAM(s) Oral every 4 hours PRN Moderate Pain (4 - 6)  oxyCODONE    IR 10 milliGRAM(s) Oral every 4 hours PRN Severe Pain (7 - 10)  oxyCODONE    IR 10 milliGRAM(s) Oral every 4 hours PRN Severe Pain (7 - 10)          Vital Signs Last 24 Hrs  T(C): 38.2 (21 May 2024 09:51), Max: 38.2 (21 May 2024 09:51)  T(F): 100.7 (21 May 2024 09:51), Max: 100.7 (21 May 2024 09:51)  HR: 59 (21 May 2024 09:18) (57 - 72)  BP: 160/68 (21 May 2024 09:18) (134/62 - 163/73)  BP(mean): 111 (20 May 2024 12:09) (111 - 111)  RR: 18 (21 May 2024 09:51) (18 - 19)  SpO2: 92% (21 May 2024 09:51) (85% - 98%)    Parameters below as of 21 May 2024 09:51  Patient On (Oxygen Delivery Method): mask, nonrebreather  O2 Flow (L/min): 15      ABG - ( 21 May 2024 09:40 )  pH, Arterial: 7.37  pH, Blood: x     /  pCO2: 48    /  pO2: 74    / HCO3: 28    / Base Excess: 1.7   /  SaO2: 94.8                  05-20 @ 07:01  -  05-21 @ 07:00  --------------------------------------------------------  IN: 712 mL / OUT: 1300 mL / NET: -588 mL          LABS:                        13.4   9.35  )-----------( 226      ( 21 May 2024 07:30 )             46.3     05-21    139  |  101  |  30<H>  ----------------------------<  104<H>  4.8   |  24  |  1.44<H>    Ca    9.2      21 May 2024 07:24  Phos  4.3     05-21  Mg     2.1     05-21    TPro  8.0  /  Alb  3.5  /  TBili  0.7  /  DBili  x   /  AST  15  /  ALT  8<L>  /  AlkPhos  83  05-21            PT/INR - ( 20 May 2024 04:47 )   PT: 12.3 sec;   INR: 1.12 ratio         PTT - ( 21 May 2024 07:35 )  PTT:73.3 sec  Urinalysis Basic - ( 21 May 2024 07:24 )    Color: x / Appearance: x / SG: x / pH: x  Gluc: 104 mg/dL / Ketone: x  / Bili: x / Urobili: x   Blood: x / Protein: x / Nitrite: x   Leuk Esterase: x / RBC: x / WBC x   Sq Epi: x / Non Sq Epi: x / Bacteria: x                Physical Examination:  PULM: decreased BS, no wheezing   CVS: S1, S2 heard    RADIOLOGY REVIEWED      CT chest:  < from: CT Chest No Cont (05.19.24 @ 12:08) >  FINDINGS:    LUNGS AND AIRWAYS: Patent central airways.  Severe upper lobe predominant   centrilobular emphysema. Bibasilar dependent atelectasis.  PLEURA: Small left pleural effusion.  MEDIASTINUM AND MAIN: Enlarged mediastinal lymph nodes including a   subcarinal lymph node measures 2.5 cm short axis in AP window lymph node   measures up to 1.3 cm short axis.  VESSELS: Coronary artery and aortic atherosclerosis.4 cm pulmonary artery   calcium seen in pulmonary hypertension.  HEART: Heart is enlarged No pericardial effusion. Aortic valve   calcifications.  CHEST WALL AND LOWER NECK: 1 cm left supraclavicular lymph node.  VISUALIZED UPPER ABDOMEN: Similar splenomegaly. Cholelithiasis.  BONES: Within normal limits.    IMPRESSION:  Indeterminate mediastinal and supraclavicular adenopathy    --- End of Report ---    < end of copied text >

## 2024-05-21 NOTE — PROGRESS NOTE ADULT - ATTENDING COMMENTS
pt respiratory status worsened   on non rebreather mask  transfer to SICU  will perform AKA tomorrow given patient increasing pain medication requirement  risk of respiratory failure was discussed with the patient and her family

## 2024-05-21 NOTE — PROGRESS NOTE ADULT - SUBJECTIVE AND OBJECTIVE BOX
Subjective: Patient seen and examined. No new events except as noted.     SUBJECTIVE/ROS:  nad  MEDICATIONS:  MEDICATIONS  (STANDING):  albuterol/ipratropium for Nebulization 3 milliLiter(s) Nebulizer every 6 hours  aMIOdarone    Tablet 200 milliGRAM(s) Oral daily  amLODIPine   Tablet 5 milliGRAM(s) Oral daily  aspirin enteric coated 81 milliGRAM(s) Oral daily  atorvastatin 40 milliGRAM(s) Oral at bedtime  budesonide 160 MICROgram(s)/formoterol 4.5 MICROgram(s) Inhaler 2 Puff(s) Inhalation two times a day  heparin  Infusion.  Unit(s)/Hr (11 mL/Hr) IV Continuous <Continuous>  melatonin 3 milliGRAM(s) Oral at bedtime  metoprolol succinate ER 25 milliGRAM(s) Oral daily  pantoprazole    Tablet 40 milliGRAM(s) Oral before breakfast  polyethylene glycol 3350 17 Gram(s) Oral daily  senna 2 Tablet(s) Oral at bedtime  tiotropium 2.5 MICROgram(s) Inhaler 2 Puff(s) Inhalation daily  torsemide 20 milliGRAM(s) Oral daily      PHYSICAL EXAM:  T(C): 36.8 (05-21-24 @ 04:48), Max: 37.1 (05-20-24 @ 09:21)  HR: 63 (05-21-24 @ 05:05) (57 - 72)  BP: 157/73 (05-21-24 @ 05:05) (134/62 - 163/73)  RR: 18 (05-21-24 @ 04:48) (18 - 19)  SpO2: 85% (05-21-24 @ 04:48) (85% - 98%)  Wt(kg): --  I&O's Summary    20 May 2024 07:01  -  21 May 2024 07:00  --------------------------------------------------------  IN: 712 mL / OUT: 1300 mL / NET: -588 mL      Height (cm): 162.6 (05-20 @ 14:40)  Weight (kg): 61.371 (05-20 @ 14:40)  BMI (kg/m2): 23.2 (05-20 @ 14:40)  BSA (m2): 1.66 (05-20 @ 14:40)      JVP: Normal  Neck: supple  Lung: clear   CV: S1 S2 , Murmur:  Abd: soft  Ext: No edema  neuro: Awake / alert  Psych: flat affect  Skin: normal``    LABS/DATA:    CARDIAC MARKERS:                                14.0   9.75  )-----------( 190      ( 20 May 2024 22:23 )             45.8     05-20    140  |  103  |  33<H>  ----------------------------<  106<H>  3.5   |  26  |  1.38<H>    Ca    8.7      20 May 2024 04:47  Phos  3.6     05-20  Mg     2.0     05-20    TPro  7.6  /  Alb  3.4  /  TBili  0.5  /  DBili  x   /  AST  11  /  ALT  8<L>  /  AlkPhos  83  05-20    proBNP:   Lipid Profile:   HgA1c:   TSH:     TELE:  EKG:

## 2024-05-21 NOTE — PROGRESS NOTE ADULT - ASSESSMENT
83F h/o CAD (1 stent placed), COPD, HTN, HLD, PAF on amio and a/c , PAD s/p  right ilio-profunda and jump femoro-above knee popliteal bypass in summer 2018, brain aneurysm p/w 3d of RLE pain, numbness. States sxs started as b/l pain in soles of feet Monday night. Went to [facility] who dc'd with dx of plantar fasciitis. Today, R foot became numb and more painful. Also noted purple discoloration. Endorses rest pain, no wounds.       # PAD  - Pt with PAD to her RLE  - Planned for R AKA --> Unable to undergo surgery yesterday 5/20 due to concern with anesthesia and pulmonary status   - Pain control   - On Heparin gtt; Monitor Ptt and adjust as per normogram, monitor H/H  - Patient is medically optimized for OR. Patient is deemed to be an elevated risk candidate for surgical intervention.   - Per Vascular surgery     # Fever  - Pt febrile to 100.7 rectally  - F/u BCx2 --> In lab  - Suggest to start patient on empiric ABX with Zosyn   - F/u RSV/Flu/Covid --> Add on full RVP   - Trend CBC, Temp curve, VS and patient     # Acute Hypoxic Respiratory failure  - Per pulm, patient on 4L NC saturating 60%, placed on NRB  - Pt already on AC with heparin gtt - however in agreement with Pulmonary to check CT Angio to R/O PE and evaluate lung parenchyma  - F/u CXR   - Started on Solumedrol 125 IV X 1, followed by 20 Q8 per Pulm   - Discussed with Vascular - patient pending SICU consult  - Monitor on continues pulse ox    # CAD   - On Heparin gtt; Monitor Ptt and adjust as per normogram, monitor H/H  - On Plavix and Statin therapies  - BP control  - TTE as noted with Grade II DD   - Monitor on tele  - Cardio follow up     # TOBIAS  - Likely SCHUYLER  - Monitor urine output   - Check bladder scan  - Avoid nephrotoxic medication  - PO/Oral hydration   - Cr down-trending, continue to monitor and trend. Can give gentle hydration for CT Angio     # A fib  - On Amiodarone  - Rate control - monitor on tele; monitor electrolytes    - On AC with heparin gtt  - Cardio follow up     # COPD  - On Bronchodilators  - On O2 supplementation; Maintain O2 > 88%    - Pulm eval appreciated; F/u recs    Abnormal CT Chest  - Pt with reported PET/CT outpatient due to enlarged lymphadenopathy  - CT C here with severe upper lobe centrilobar emphysema, enlarged Mediastinal LN, subcarinal node, 1cm supraclavicular LN, Splenomegaly   - Consider in versus outpatient biopsy to rule out lymphoma.  Follow up closely with Pulmonology      PPX      Discussed with Attending, Vascular surgery, Pulmonology, RN

## 2024-05-22 ENCOUNTER — APPOINTMENT (OUTPATIENT)
Dept: VASCULAR SURGERY | Facility: HOSPITAL | Age: 84
End: 2024-05-22

## 2024-05-22 ENCOUNTER — RESULT REVIEW (OUTPATIENT)
Age: 84
End: 2024-05-22

## 2024-05-22 LAB
ADD ON TEST-SPECIMEN IN LAB: SIGNIFICANT CHANGE UP
ANION GAP SERPL CALC-SCNC: 15 MMOL/L — SIGNIFICANT CHANGE UP (ref 5–17)
ANION GAP SERPL CALC-SCNC: 16 MMOL/L — SIGNIFICANT CHANGE UP (ref 5–17)
ANION GAP SERPL CALC-SCNC: 19 MMOL/L — HIGH (ref 5–17)
APTT BLD: 25.4 SEC — SIGNIFICANT CHANGE UP (ref 24.5–35.6)
APTT BLD: 60 SEC — HIGH (ref 24.5–35.6)
BASE EXCESS BLDV CALC-SCNC: -0.8 MMOL/L — SIGNIFICANT CHANGE UP (ref -2–3)
BASE EXCESS BLDV CALC-SCNC: -1.4 MMOL/L — SIGNIFICANT CHANGE UP (ref -2–3)
BASE EXCESS BLDV CALC-SCNC: -2.8 MMOL/L — LOW (ref -2–3)
BUN SERPL-MCNC: 43 MG/DL — HIGH (ref 7–23)
BUN SERPL-MCNC: 43 MG/DL — HIGH (ref 7–23)
BUN SERPL-MCNC: 46 MG/DL — HIGH (ref 7–23)
CA-I SERPL-SCNC: 1.12 MMOL/L — LOW (ref 1.15–1.33)
CA-I SERPL-SCNC: 1.23 MMOL/L — SIGNIFICANT CHANGE UP (ref 1.15–1.33)
CA-I SERPL-SCNC: 1.32 MMOL/L — SIGNIFICANT CHANGE UP (ref 1.15–1.33)
CALCIUM SERPL-MCNC: 8.6 MG/DL — SIGNIFICANT CHANGE UP (ref 8.4–10.5)
CALCIUM SERPL-MCNC: 8.7 MG/DL — SIGNIFICANT CHANGE UP (ref 8.4–10.5)
CALCIUM SERPL-MCNC: 9.6 MG/DL — SIGNIFICANT CHANGE UP (ref 8.4–10.5)
CHLORIDE BLDV-SCNC: 100 MMOL/L — SIGNIFICANT CHANGE UP (ref 96–108)
CHLORIDE BLDV-SCNC: 97 MMOL/L — SIGNIFICANT CHANGE UP (ref 96–108)
CHLORIDE BLDV-SCNC: 99 MMOL/L — SIGNIFICANT CHANGE UP (ref 96–108)
CHLORIDE SERPL-SCNC: 100 MMOL/L — SIGNIFICANT CHANGE UP (ref 96–108)
CHLORIDE SERPL-SCNC: 97 MMOL/L — SIGNIFICANT CHANGE UP (ref 96–108)
CHLORIDE SERPL-SCNC: 98 MMOL/L — SIGNIFICANT CHANGE UP (ref 96–108)
CK SERPL-CCNC: 49 U/L — SIGNIFICANT CHANGE UP (ref 25–170)
CO2 BLDV-SCNC: 25 MMOL/L — SIGNIFICANT CHANGE UP (ref 22–26)
CO2 BLDV-SCNC: 27 MMOL/L — HIGH (ref 22–26)
CO2 BLDV-SCNC: 27 MMOL/L — HIGH (ref 22–26)
CO2 SERPL-SCNC: 21 MMOL/L — LOW (ref 22–31)
CO2 SERPL-SCNC: 21 MMOL/L — LOW (ref 22–31)
CO2 SERPL-SCNC: 22 MMOL/L — SIGNIFICANT CHANGE UP (ref 22–31)
CREAT ?TM UR-MCNC: 112 MG/DL — SIGNIFICANT CHANGE UP
CREAT SERPL-MCNC: 1.82 MG/DL — HIGH (ref 0.5–1.3)
CREAT SERPL-MCNC: 2.23 MG/DL — HIGH (ref 0.5–1.3)
CREAT SERPL-MCNC: 2.37 MG/DL — HIGH (ref 0.5–1.3)
EGFR: 20 ML/MIN/1.73M2 — LOW
EGFR: 22 ML/MIN/1.73M2 — LOW
EGFR: 28 ML/MIN/1.73M2 — LOW
GAS PNL BLDV: 132 MMOL/L — LOW (ref 136–145)
GAS PNL BLDV: 132 MMOL/L — LOW (ref 136–145)
GAS PNL BLDV: 134 MMOL/L — LOW (ref 136–145)
GAS PNL BLDV: SIGNIFICANT CHANGE UP
GLUCOSE BLDC GLUCOMTR-MCNC: 150 MG/DL — HIGH (ref 70–99)
GLUCOSE BLDC GLUCOMTR-MCNC: 201 MG/DL — HIGH (ref 70–99)
GLUCOSE BLDV-MCNC: 150 MG/DL — HIGH (ref 70–99)
GLUCOSE BLDV-MCNC: 157 MG/DL — HIGH (ref 70–99)
GLUCOSE BLDV-MCNC: 226 MG/DL — HIGH (ref 70–99)
GLUCOSE SERPL-MCNC: 147 MG/DL — HIGH (ref 70–99)
GLUCOSE SERPL-MCNC: 157 MG/DL — HIGH (ref 70–99)
GLUCOSE SERPL-MCNC: 210 MG/DL — HIGH (ref 70–99)
HCO3 BLDV-SCNC: 24 MMOL/L — SIGNIFICANT CHANGE UP (ref 22–29)
HCO3 BLDV-SCNC: 25 MMOL/L — SIGNIFICANT CHANGE UP (ref 22–29)
HCO3 BLDV-SCNC: 26 MMOL/L — SIGNIFICANT CHANGE UP (ref 22–29)
HCT VFR BLD CALC: 40.7 % — SIGNIFICANT CHANGE UP (ref 34.5–45)
HCT VFR BLD CALC: 42.4 % — SIGNIFICANT CHANGE UP (ref 34.5–45)
HCT VFR BLDA CALC: 38 % — SIGNIFICANT CHANGE UP (ref 34.5–46.5)
HCT VFR BLDA CALC: 38 % — SIGNIFICANT CHANGE UP (ref 34.5–46.5)
HCT VFR BLDA CALC: 40 % — SIGNIFICANT CHANGE UP (ref 34.5–46.5)
HGB BLD CALC-MCNC: 12.5 G/DL — SIGNIFICANT CHANGE UP (ref 11.7–16.1)
HGB BLD CALC-MCNC: 12.6 G/DL — SIGNIFICANT CHANGE UP (ref 11.7–16.1)
HGB BLD CALC-MCNC: 13.2 G/DL — SIGNIFICANT CHANGE UP (ref 11.7–16.1)
HGB BLD-MCNC: 12.1 G/DL — SIGNIFICANT CHANGE UP (ref 11.5–15.5)
HGB BLD-MCNC: 12.6 G/DL — SIGNIFICANT CHANGE UP (ref 11.5–15.5)
HOROWITZ INDEX BLDV+IHG-RTO: 32 — SIGNIFICANT CHANGE UP
HOROWITZ INDEX BLDV+IHG-RTO: 36 — SIGNIFICANT CHANGE UP
HOROWITZ INDEX BLDV+IHG-RTO: 36 — SIGNIFICANT CHANGE UP
INR BLD: 1.07 RATIO — SIGNIFICANT CHANGE UP (ref 0.85–1.18)
INR BLD: 1.07 RATIO — SIGNIFICANT CHANGE UP (ref 0.85–1.18)
LACTATE BLDV-MCNC: 2.1 MMOL/L — HIGH (ref 0.5–2)
LACTATE BLDV-MCNC: 2.3 MMOL/L — HIGH (ref 0.5–2)
LACTATE BLDV-MCNC: 3.9 MMOL/L — HIGH (ref 0.5–2)
MAGNESIUM SERPL-MCNC: 2.5 MG/DL — SIGNIFICANT CHANGE UP (ref 1.6–2.6)
MAGNESIUM SERPL-MCNC: 2.6 MG/DL — SIGNIFICANT CHANGE UP (ref 1.6–2.6)
MAGNESIUM SERPL-MCNC: 2.9 MG/DL — HIGH (ref 1.6–2.6)
MCHC RBC-ENTMCNC: 23 PG — LOW (ref 27–34)
MCHC RBC-ENTMCNC: 23.3 PG — LOW (ref 27–34)
MCHC RBC-ENTMCNC: 29.7 GM/DL — LOW (ref 32–36)
MCHC RBC-ENTMCNC: 29.7 GM/DL — LOW (ref 32–36)
MCV RBC AUTO: 77.4 FL — LOW (ref 80–100)
MCV RBC AUTO: 78.4 FL — LOW (ref 80–100)
NRBC # BLD: 0 /100 WBCS — SIGNIFICANT CHANGE UP (ref 0–0)
NRBC # BLD: 0 /100 WBCS — SIGNIFICANT CHANGE UP (ref 0–0)
PCO2 BLDV: 47 MMHG — HIGH (ref 39–42)
PCO2 BLDV: 47 MMHG — HIGH (ref 39–42)
PCO2 BLDV: 52 MMHG — HIGH (ref 39–42)
PH BLDV: 7.3 — LOW (ref 7.32–7.43)
PH BLDV: 7.31 — LOW (ref 7.32–7.43)
PH BLDV: 7.34 — SIGNIFICANT CHANGE UP (ref 7.32–7.43)
PHOSPHATE SERPL-MCNC: 4.9 MG/DL — HIGH (ref 2.5–4.5)
PHOSPHATE SERPL-MCNC: 5.5 MG/DL — HIGH (ref 2.5–4.5)
PHOSPHATE SERPL-MCNC: 5.7 MG/DL — HIGH (ref 2.5–4.5)
PLATELET # BLD AUTO: 172 K/UL — SIGNIFICANT CHANGE UP (ref 150–400)
PLATELET # BLD AUTO: 191 K/UL — SIGNIFICANT CHANGE UP (ref 150–400)
PO2 BLDV: 31 MMHG — SIGNIFICANT CHANGE UP (ref 25–45)
PO2 BLDV: 39 MMHG — SIGNIFICANT CHANGE UP (ref 25–45)
PO2 BLDV: 47 MMHG — HIGH (ref 25–45)
POTASSIUM BLDV-SCNC: 4.2 MMOL/L — SIGNIFICANT CHANGE UP (ref 3.5–5.1)
POTASSIUM BLDV-SCNC: 4.3 MMOL/L — SIGNIFICANT CHANGE UP (ref 3.5–5.1)
POTASSIUM BLDV-SCNC: 4.4 MMOL/L — SIGNIFICANT CHANGE UP (ref 3.5–5.1)
POTASSIUM SERPL-MCNC: 4.1 MMOL/L — SIGNIFICANT CHANGE UP (ref 3.5–5.3)
POTASSIUM SERPL-MCNC: 4.2 MMOL/L — SIGNIFICANT CHANGE UP (ref 3.5–5.3)
POTASSIUM SERPL-MCNC: 4.4 MMOL/L — SIGNIFICANT CHANGE UP (ref 3.5–5.3)
POTASSIUM SERPL-SCNC: 4.1 MMOL/L — SIGNIFICANT CHANGE UP (ref 3.5–5.3)
POTASSIUM SERPL-SCNC: 4.2 MMOL/L — SIGNIFICANT CHANGE UP (ref 3.5–5.3)
POTASSIUM SERPL-SCNC: 4.4 MMOL/L — SIGNIFICANT CHANGE UP (ref 3.5–5.3)
PROTHROM AB SERPL-ACNC: 11.2 SEC — SIGNIFICANT CHANGE UP (ref 9.5–13)
PROTHROM AB SERPL-ACNC: 11.8 SEC — SIGNIFICANT CHANGE UP (ref 9.5–13)
RBC # BLD: 5.26 M/UL — HIGH (ref 3.8–5.2)
RBC # BLD: 5.41 M/UL — HIGH (ref 3.8–5.2)
RBC # FLD: 20.2 % — HIGH (ref 10.3–14.5)
RBC # FLD: 20.8 % — HIGH (ref 10.3–14.5)
SAO2 % BLDV: 48.2 % — LOW (ref 67–88)
SAO2 % BLDV: 68.5 % — SIGNIFICANT CHANGE UP (ref 67–88)
SAO2 % BLDV: 70.7 % — SIGNIFICANT CHANGE UP (ref 67–88)
SODIUM SERPL-SCNC: 135 MMOL/L — SIGNIFICANT CHANGE UP (ref 135–145)
SODIUM SERPL-SCNC: 137 MMOL/L — SIGNIFICANT CHANGE UP (ref 135–145)
SODIUM SERPL-SCNC: 137 MMOL/L — SIGNIFICANT CHANGE UP (ref 135–145)
SODIUM UR-SCNC: 60 MMOL/L — SIGNIFICANT CHANGE UP
WBC # BLD: 10 K/UL — SIGNIFICANT CHANGE UP (ref 3.8–10.5)
WBC # BLD: 11.11 K/UL — HIGH (ref 3.8–10.5)
WBC # FLD AUTO: 10 K/UL — SIGNIFICANT CHANGE UP (ref 3.8–10.5)
WBC # FLD AUTO: 11.11 K/UL — HIGH (ref 3.8–10.5)

## 2024-05-22 PROCEDURE — 27590 AMPUTATE LEG AT THIGH: CPT | Mod: RT

## 2024-05-22 PROCEDURE — 88311 DECALCIFY TISSUE: CPT | Mod: 26

## 2024-05-22 PROCEDURE — 88307 TISSUE EXAM BY PATHOLOGIST: CPT | Mod: 26

## 2024-05-22 PROCEDURE — 93308 TTE F-UP OR LMTD: CPT | Mod: 26

## 2024-05-22 PROCEDURE — 99233 SBSQ HOSP IP/OBS HIGH 50: CPT | Mod: GC

## 2024-05-22 PROCEDURE — 71045 X-RAY EXAM CHEST 1 VIEW: CPT | Mod: 26

## 2024-05-22 DEVICE — KIT A-LINE 1LUM 20G X 12CM SAFE KIT: Type: IMPLANTABLE DEVICE | Site: RIGHT | Status: FUNCTIONAL

## 2024-05-22 DEVICE — SURGICEL 2 X 14": Type: IMPLANTABLE DEVICE | Site: RIGHT | Status: FUNCTIONAL

## 2024-05-22 DEVICE — BONE WAX 2.5GM: Type: IMPLANTABLE DEVICE | Site: RIGHT | Status: FUNCTIONAL

## 2024-05-22 RX ORDER — HYDRALAZINE HCL 50 MG
10 TABLET ORAL ONCE
Refills: 0 | Status: COMPLETED | OUTPATIENT
Start: 2024-05-22 | End: 2024-05-22

## 2024-05-22 RX ORDER — CEFAZOLIN SODIUM 1 G
500 VIAL (EA) INJECTION EVERY 8 HOURS
Refills: 0 | Status: COMPLETED | OUTPATIENT
Start: 2024-05-22 | End: 2024-05-23

## 2024-05-22 RX ORDER — INSULIN LISPRO 100/ML
VIAL (ML) SUBCUTANEOUS EVERY 6 HOURS
Refills: 0 | Status: DISCONTINUED | OUTPATIENT
Start: 2024-05-22 | End: 2024-05-24

## 2024-05-22 RX ORDER — CALCIUM GLUCONATE 100 MG/ML
2 VIAL (ML) INTRAVENOUS ONCE
Refills: 0 | Status: COMPLETED | OUTPATIENT
Start: 2024-05-22 | End: 2024-05-22

## 2024-05-22 RX ORDER — SODIUM CHLORIDE 9 MG/ML
500 INJECTION, SOLUTION INTRAVENOUS ONCE
Refills: 0 | Status: COMPLETED | OUTPATIENT
Start: 2024-05-22 | End: 2024-05-22

## 2024-05-22 RX ADMIN — HYDROMORPHONE HYDROCHLORIDE 0.5 MILLIGRAM(S): 2 INJECTION INTRAMUSCULAR; INTRAVENOUS; SUBCUTANEOUS at 01:46

## 2024-05-22 RX ADMIN — Medication 3 MILLILITER(S): at 05:04

## 2024-05-22 RX ADMIN — Medication 2: at 17:03

## 2024-05-22 RX ADMIN — OXYCODONE HYDROCHLORIDE 5 MILLIGRAM(S): 5 TABLET ORAL at 22:58

## 2024-05-22 RX ADMIN — Medication 200 GRAM(S): at 01:46

## 2024-05-22 RX ADMIN — Medication 100 MILLIGRAM(S): at 22:03

## 2024-05-22 RX ADMIN — Medication 3 MILLILITER(S): at 23:08

## 2024-05-22 RX ADMIN — HYDROMORPHONE HYDROCHLORIDE 0.5 MILLIGRAM(S): 2 INJECTION INTRAMUSCULAR; INTRAVENOUS; SUBCUTANEOUS at 05:49

## 2024-05-22 RX ADMIN — BUDESONIDE AND FORMOTEROL FUMARATE DIHYDRATE 2 PUFF(S): 160; 4.5 AEROSOL RESPIRATORY (INHALATION) at 17:11

## 2024-05-22 RX ADMIN — HYDROMORPHONE HYDROCHLORIDE 0.5 MILLIGRAM(S): 2 INJECTION INTRAMUSCULAR; INTRAVENOUS; SUBCUTANEOUS at 00:46

## 2024-05-22 RX ADMIN — BUDESONIDE AND FORMOTEROL FUMARATE DIHYDRATE 2 PUFF(S): 160; 4.5 AEROSOL RESPIRATORY (INHALATION) at 05:04

## 2024-05-22 RX ADMIN — HYDROMORPHONE HYDROCHLORIDE 0.5 MILLIGRAM(S): 2 INJECTION INTRAMUSCULAR; INTRAVENOUS; SUBCUTANEOUS at 23:02

## 2024-05-22 RX ADMIN — HEPARIN SODIUM 11 UNIT(S)/HR: 5000 INJECTION INTRAVENOUS; SUBCUTANEOUS at 07:30

## 2024-05-22 RX ADMIN — Medication 3 MILLIGRAM(S): at 22:03

## 2024-05-22 RX ADMIN — Medication 3 MILLILITER(S): at 11:28

## 2024-05-22 RX ADMIN — HYDROMORPHONE HYDROCHLORIDE 0.5 MILLIGRAM(S): 2 INJECTION INTRAMUSCULAR; INTRAVENOUS; SUBCUTANEOUS at 16:34

## 2024-05-22 RX ADMIN — HEPARIN SODIUM 11 UNIT(S)/HR: 5000 INJECTION INTRAVENOUS; SUBCUTANEOUS at 04:49

## 2024-05-22 RX ADMIN — HYDROMORPHONE HYDROCHLORIDE 0.5 MILLIGRAM(S): 2 INJECTION INTRAMUSCULAR; INTRAVENOUS; SUBCUTANEOUS at 08:52

## 2024-05-22 RX ADMIN — HYDROMORPHONE HYDROCHLORIDE 0.5 MILLIGRAM(S): 2 INJECTION INTRAMUSCULAR; INTRAVENOUS; SUBCUTANEOUS at 16:19

## 2024-05-22 RX ADMIN — Medication 10 MILLIGRAM(S): at 10:57

## 2024-05-22 RX ADMIN — HYDROMORPHONE HYDROCHLORIDE 0.5 MILLIGRAM(S): 2 INJECTION INTRAMUSCULAR; INTRAVENOUS; SUBCUTANEOUS at 09:12

## 2024-05-22 RX ADMIN — OXYCODONE HYDROCHLORIDE 5 MILLIGRAM(S): 5 TABLET ORAL at 22:03

## 2024-05-22 RX ADMIN — Medication 20 MILLIGRAM(S): at 20:08

## 2024-05-22 RX ADMIN — ATORVASTATIN CALCIUM 40 MILLIGRAM(S): 80 TABLET, FILM COATED ORAL at 22:03

## 2024-05-22 RX ADMIN — HYDROMORPHONE HYDROCHLORIDE 0.5 MILLIGRAM(S): 2 INJECTION INTRAMUSCULAR; INTRAVENOUS; SUBCUTANEOUS at 04:49

## 2024-05-22 RX ADMIN — Medication 3 MILLILITER(S): at 17:11

## 2024-05-22 RX ADMIN — CHLORHEXIDINE GLUCONATE 1 APPLICATION(S): 213 SOLUTION TOPICAL at 04:44

## 2024-05-22 RX ADMIN — Medication 20 MILLIGRAM(S): at 03:56

## 2024-05-22 RX ADMIN — SENNA PLUS 2 TABLET(S): 8.6 TABLET ORAL at 22:03

## 2024-05-22 RX ADMIN — SODIUM CHLORIDE 2000 MILLILITER(S): 9 INJECTION, SOLUTION INTRAVENOUS at 01:52

## 2024-05-22 RX ADMIN — Medication 20 MILLIGRAM(S): at 11:07

## 2024-05-22 NOTE — PROGRESS NOTE ADULT - SUBJECTIVE AND OBJECTIVE BOX
Follow-up Pulm Progress Note    hypoxia improving   sats mid 90s on 2LNC at this time  denies SOB/CP     Medications:  MEDICATIONS  (STANDING):  albuterol/ipratropium for Nebulization 3 milliLiter(s) Nebulizer every 6 hours  aMIOdarone    Tablet 200 milliGRAM(s) Oral daily  amLODIPine   Tablet 5 milliGRAM(s) Oral daily  aspirin enteric coated 81 milliGRAM(s) Oral daily  atorvastatin 40 milliGRAM(s) Oral at bedtime  budesonide 160 MICROgram(s)/formoterol 4.5 MICROgram(s) Inhaler 2 Puff(s) Inhalation two times a day  chlorhexidine 2% Cloths 1 Application(s) Topical <User Schedule>  heparin  Infusion 11 Unit(s)/Hr (11 mL/Hr) IV Continuous <Continuous>  insulin lispro (ADMELOG) corrective regimen sliding scale   SubCutaneous every 6 hours  melatonin 3 milliGRAM(s) Oral at bedtime  methylPREDNISolone sodium succinate Injectable 20 milliGRAM(s) IV Push every 8 hours  metoprolol succinate ER 25 milliGRAM(s) Oral daily  pantoprazole    Tablet 40 milliGRAM(s) Oral before breakfast  polyethylene glycol 3350 17 Gram(s) Oral daily  senna 2 Tablet(s) Oral at bedtime  tiotropium 2.5 MICROgram(s) Inhaler 2 Puff(s) Inhalation daily  torsemide 20 milliGRAM(s) Oral daily    MEDICATIONS  (PRN):  acetaminophen     Tablet .. 975 milliGRAM(s) Oral every 6 hours PRN Mild Pain (1 - 3)  HYDROmorphone  Injectable 0.5 milliGRAM(s) IV Push every 4 hours PRN Severe Pain (7 - 10)  oxyCODONE    IR 5 milliGRAM(s) Oral every 4 hours PRN Moderate Pain (4 - 6)  oxyCODONE    IR 10 milliGRAM(s) Oral every 4 hours PRN Severe Pain (7 - 10)          Vital Signs Last 24 Hrs  T(C): 36.8 (22 May 2024 11:00), Max: 36.9 (22 May 2024 07:00)  T(F): 98.3 (22 May 2024 11:00), Max: 98.5 (22 May 2024 07:00)  HR: 71 (22 May 2024 13:00) (46 - 75)  BP: 147/68 (22 May 2024 13:00) (100/59 - 179/86)  BP(mean): 98 (22 May 2024 13:00) (73 - 124)  RR: 24 (22 May 2024 13:00) (16 - 30)  SpO2: 94% (22 May 2024 13:00) (88% - 100%)    Parameters below as of 22 May 2024 12:00  Patient On (Oxygen Delivery Method): nasal cannula  O2 Flow (L/min): 3      ABG - ( 21 May 2024 09:40 )  pH, Arterial: 7.37  pH, Blood: x     /  pCO2: 48    /  pO2: 74    / HCO3: 28    / Base Excess: 1.7   /  SaO2: 94.8              VBG pH 7.34 05-22 @ 03:55    VBG pCO2 47 05-22 @ 03:55    VBG O2 sat 70.7 05-22 @ 03:55    VBG lactate 2.3 05-22 @ 03:55  VBG pH 7.31 05-22 @ 00:36    VBG pCO2 47 05-22 @ 00:36    VBG O2 sat 68.5 05-22 @ 00:36    VBG lactate 3.9 05-22 @ 00:36  VBG pH 7.36 05-21 @ 12:05    VBG pCO2 51 05-21 @ 12:05    VBG O2 sat 77.1 05-21 @ 12:05    VBG lactate 1.3 05-21 @ 12:05      05-21 @ 07:01  -  05-22 @ 07:00  --------------------------------------------------------  IN: 1150 mL / OUT: 750 mL / NET: 400 mL          LABS:                        12.6   10.00 )-----------( 191      ( 22 May 2024 00:50 )             42.4     05-22    135  |  98  |  43<H>  ----------------------------<  147<H>  4.4   |  21<L>  |  2.23<H>    Ca    9.6      22 May 2024 04:01  Phos  5.5     05-22  Mg     2.9     05-22    TPro  8.0  /  Alb  3.5  /  TBili  0.7  /  DBili  x   /  AST  15  /  ALT  8<L>  /  AlkPhos  83  05-21      CARDIAC MARKERS ( 22 May 2024 04:01 )  x     / x     / 49 U/L / x     / x          CAPILLARY BLOOD GLUCOSE      POCT Blood Glucose.: 150 mg/dL (22 May 2024 11:06)    PT/INR - ( 22 May 2024 00:50 )   PT: 11.8 sec;   INR: 1.07 ratio         PTT - ( 22 May 2024 00:50 )  PTT:60.0 sec  Urinalysis Basic - ( 22 May 2024 04:01 )    Color: x / Appearance: x / SG: x / pH: x  Gluc: 147 mg/dL / Ketone: x  / Bili: x / Urobili: x   Blood: x / Protein: x / Nitrite: x   Leuk Esterase: x / RBC: x / WBC x   Sq Epi: x / Non Sq Epi: x / Bacteria: x                        Physical Examination:  PULM: decreased BS, no wheezing   CVS: S1, S2 heard    RADIOLOGY REVIEWED      CT chest:  < from: CT Chest No Cont (05.19.24 @ 12:08) >  FINDINGS:    LUNGS AND AIRWAYS: Patent central airways.  Severe upper lobe predominant   centrilobular emphysema. Bibasilar dependent atelectasis.  PLEURA: Small left pleural effusion.  MEDIASTINUM AND MAIN: Enlarged mediastinal lymph nodes including a   subcarinal lymph node measures 2.5 cm short axis in AP window lymph node   measures up to 1.3 cm short axis.  VESSELS: Coronary artery and aortic atherosclerosis.4 cm pulmonary artery   calcium seen in pulmonary hypertension.  HEART: Heart is enlarged No pericardial effusion. Aortic valve   calcifications.  CHEST WALL AND LOWER NECK: 1 cm left supraclavicular lymph node.  VISUALIZED UPPER ABDOMEN: Similar splenomegaly. Cholelithiasis.  BONES: Within normal limits.    IMPRESSION:  Indeterminate mediastinal and supraclavicular adenopathy    --- End of Report ---    < end of copied text >

## 2024-05-22 NOTE — DIETITIAN INITIAL EVALUATION ADULT - ADD RECOMMEND
1) Upon advancement, recommend regular/low sodium diet   2) Add Ensure Max x1/day   3) Add multivitamin daily   4) Monitor PO intake, diet tolerance, weight trends, labs, GI function, and skin integrity  5) Malnutrition sticker placed     Lyndsey Rhoades MS, RDN, CDN ([employer])

## 2024-05-22 NOTE — DIETITIAN INITIAL EVALUATION ADULT - REASON FOR ADMISSION
"83F h/o CAD (1 stent placed), COPD, HTN, HLD, PAD s/p  right ilio-profunda and jump femoro-above knee popliteal bypass in summer 2018, brain aneurysm p/w 3d of RLE pain, numbness. States sxs started as b/l pain in soles of feet Monday night. Went to [facility] who dc'd with dx of plantar fasciitis. Today, R foot became numb and more painful. Also noted purple discoloration. Endorses rest pain, no wounds."

## 2024-05-22 NOTE — DIETITIAN INITIAL EVALUATION ADULT - ORAL INTAKE PTA/DIET HISTORY
PTA per pt  -Intake: good PO intake; attempts to follow low sodium - still endorses uses "some" salt to cook; consumes Ensure shakes   -Chewing/Swallowing: denies difficulty   -Allergies/Intolerances: NKFA  -Vitamins/Supplements: Vitamin D3

## 2024-05-22 NOTE — DIETITIAN INITIAL EVALUATION ADULT - NS FNS DIET ORDER
Diet, NPO after Midnight:      NPO Start Date: 21-May-2024,   NPO Start Time: 23:59 (05-21-24 @ 18:26)

## 2024-05-22 NOTE — PROGRESS NOTE ADULT - SUBJECTIVE AND OBJECTIVE BOX
SICU Daily Progress Note  =====================================================  Interval events  - Plan for possible OR    Allergies: No Known Allergies      MEDICATIONS:   --------------------------------------------------------------------------------------  Neurologic Medications  acetaminophen     Tablet .. 975 milliGRAM(s) Oral every 6 hours PRN Mild Pain (1 - 3)  HYDROmorphone  Injectable 0.5 milliGRAM(s) IV Push every 4 hours PRN Severe Pain (7 - 10)  melatonin 3 milliGRAM(s) Oral at bedtime  oxyCODONE    IR 5 milliGRAM(s) Oral every 4 hours PRN Moderate Pain (4 - 6)  oxyCODONE    IR 10 milliGRAM(s) Oral every 4 hours PRN Severe Pain (7 - 10)    Respiratory Medications  albuterol/ipratropium for Nebulization 3 milliLiter(s) Nebulizer every 6 hours  budesonide 160 MICROgram(s)/formoterol 4.5 MICROgram(s) Inhaler 2 Puff(s) Inhalation two times a day  tiotropium 2.5 MICROgram(s) Inhaler 2 Puff(s) Inhalation daily    Cardiovascular Medications  aMIOdarone    Tablet 200 milliGRAM(s) Oral daily  amLODIPine   Tablet 5 milliGRAM(s) Oral daily  metoprolol succinate ER 25 milliGRAM(s) Oral daily  torsemide 20 milliGRAM(s) Oral daily    Gastrointestinal Medications  pantoprazole    Tablet 40 milliGRAM(s) Oral before breakfast  polyethylene glycol 3350 17 Gram(s) Oral daily  senna 2 Tablet(s) Oral at bedtime    Genitourinary Medications    Hematologic/Oncologic Medications  aspirin enteric coated 81 milliGRAM(s) Oral daily  heparin  Infusion 11 Unit(s)/Hr IV Continuous <Continuous>    Antimicrobial/Immunologic Medications    Endocrine/Metabolic Medications  atorvastatin 40 milliGRAM(s) Oral at bedtime  methylPREDNISolone sodium succinate Injectable 20 milliGRAM(s) IV Push every 8 hours    Topical/Other Medications  chlorhexidine 2% Cloths 1 Application(s) Topical <User Schedule>    --------------------------------------------------------------------------------------    VITAL SIGNS, INS/OUTS (last 24 hours):  --------------------------------------------------------------------------------------   T(C): 36.7 (05-21-24 @ 23:00), Max: 38.2 (05-21-24 @ 09:51)  HR: 54 (05-22-24 @ 00:00) (46 - 63)  BP: 124/56 (05-22-24 @ 00:00) (100/59 - 163/73)  BP(mean): 81 (05-22-24 @ 00:00) (73 - 97)  ABP: --  ABP(mean): --  RR: 24 (05-22-24 @ 00:00) (16 - 33)  SpO2: 93% (05-22-24 @ 00:00) (85% - 99%)  CVP(mm Hg): --  CI: --  CAPILLARY BLOOD GLUCOSE       N/A    05-20 @ 07:01  -  05-21 @ 07:00  --------------------------------------------------------  IN:    Heparin Infusion: 132 mL    Oral Fluid: 580 mL  Total IN: 712 mL    OUT:    Voided (mL): 1300 mL  Total OUT: 1300 mL    Total NET: -588 mL      05-21 @ 07:01  -  05-22 @ 00:27  --------------------------------------------------------  IN:    Heparin: 55 mL    Heparin Infusion: 77 mL    IV PiggyBack: 50 mL    Oral Fluid: 280 mL  Total IN: 462 mL    OUT:    Voided (mL): 450 mL  Total OUT: 450 mL    Total NET: 12 mL        --------------------------------------------------------------------------------------    EXAM  NEUROLOGY: Normal, NAD, alert, oriented x3, no focal deficits.  HEENT: Normocephalic, atraumatic, EOMI.   RESPIRATORY: Lungs clear to auscultation, Normal expansion/effort.   CARDIOVASCULAR: S1, S2.  Regular rate and rhythm.   GI/NUTRITION: Abdomen soft, Non-tender, Non-distended.   VASCULAR: Extremities warm, pink, well-perfused.  MSK: All extremities moving spontaneously without limitations.  SKIN: Good skin turgor, no skin breakdown.   LINES:      LABS  --------------------------------------------------------------------------------------   CBC (05-21 @ 11:01)                              13.7                           9.96    )----------------(  237        --    % Neutrophils, --    % Lymphocytes, ANC: --                                  46.5<H>              CBC (05-21 @ 07:30)                              13.4                           9.35    )----------------(  226        --    % Neutrophils, --    % Lymphocytes, ANC: --                                  46.3<H>                BMP (05-21 @ 11:01)             136     |  97      |  30<H> 		Ca++ --      Ca 8.9                ---------------------------------( 106<H>		Mg 2.0                3.9     |  25      |  1.37<H>			Ph 4.2     BMP (05-21 @ 07:24)             139     |  101     |  30<H> 		Ca++ --      Ca 9.2                ---------------------------------( 104<H>		Mg 2.1                4.8     |  24      |  1.44<H>			Ph 4.3       LFTs (05-21 @ 07:24)      TPro 8.0 / Alb 3.5 / TBili 0.7 / DBili -- / AST 15 / ALT 8<L> / AlkPhos 83    Coags (05-21 @ 07:35)  aPTT 73.3<H> / INR -- / PT --  Coags (05-20 @ 22:23)  aPTT 80.9<H> / INR -- / PT --    ABG (05-21 @ 12:05)      /  /  /  /  / %     Lactate:   1.3  ABG (05-21 @ 09:40)     7.37 / 48<H> / 74<L> / 28 / 1.7 / 94.8%     Lactate:       VBG (05-21 @ 12:05)     7.36 / 51<H> / 48<H> / 29 / 2.4 / 77.1%      --------------------------------------------------------------------------------------    IMAGING STUDIES: SICU Daily Progress Note  =====================================================  Interval events  - Plan for possible OR    Allergies: No Known Allergies      MEDICATIONS:   --------------------------------------------------------------------------------------  Neurologic Medications  acetaminophen     Tablet .. 975 milliGRAM(s) Oral every 6 hours PRN Mild Pain (1 - 3)  HYDROmorphone  Injectable 0.5 milliGRAM(s) IV Push every 4 hours PRN Severe Pain (7 - 10)  melatonin 3 milliGRAM(s) Oral at bedtime  oxyCODONE    IR 5 milliGRAM(s) Oral every 4 hours PRN Moderate Pain (4 - 6)  oxyCODONE    IR 10 milliGRAM(s) Oral every 4 hours PRN Severe Pain (7 - 10)    Respiratory Medications  albuterol/ipratropium for Nebulization 3 milliLiter(s) Nebulizer every 6 hours  budesonide 160 MICROgram(s)/formoterol 4.5 MICROgram(s) Inhaler 2 Puff(s) Inhalation two times a day  tiotropium 2.5 MICROgram(s) Inhaler 2 Puff(s) Inhalation daily    Cardiovascular Medications  aMIOdarone    Tablet 200 milliGRAM(s) Oral daily  amLODIPine   Tablet 5 milliGRAM(s) Oral daily  metoprolol succinate ER 25 milliGRAM(s) Oral daily  torsemide 20 milliGRAM(s) Oral daily    Gastrointestinal Medications  pantoprazole    Tablet 40 milliGRAM(s) Oral before breakfast  polyethylene glycol 3350 17 Gram(s) Oral daily  senna 2 Tablet(s) Oral at bedtime    Genitourinary Medications    Hematologic/Oncologic Medications  aspirin enteric coated 81 milliGRAM(s) Oral daily  heparin  Infusion 11 Unit(s)/Hr IV Continuous <Continuous>    Antimicrobial/Immunologic Medications    Endocrine/Metabolic Medications  atorvastatin 40 milliGRAM(s) Oral at bedtime  methylPREDNISolone sodium succinate Injectable 20 milliGRAM(s) IV Push every 8 hours    Topical/Other Medications  chlorhexidine 2% Cloths 1 Application(s) Topical <User Schedule>    --------------------------------------------------------------------------------------    VITAL SIGNS, INS/OUTS (last 24 hours):  --------------------------------------------------------------------------------------   T(C): 36.7 (05-21-24 @ 23:00), Max: 38.2 (05-21-24 @ 09:51)  HR: 54 (05-22-24 @ 00:00) (46 - 63)  BP: 124/56 (05-22-24 @ 00:00) (100/59 - 163/73)  BP(mean): 81 (05-22-24 @ 00:00) (73 - 97)  ABP: --  ABP(mean): --  RR: 24 (05-22-24 @ 00:00) (16 - 33)  SpO2: 93% (05-22-24 @ 00:00) (85% - 99%)  CVP(mm Hg): --  CI: --  CAPILLARY BLOOD GLUCOSE       N/A    05-20 @ 07:01  -  05-21 @ 07:00  --------------------------------------------------------  IN:    Heparin Infusion: 132 mL    Oral Fluid: 580 mL  Total IN: 712 mL    OUT:    Voided (mL): 1300 mL  Total OUT: 1300 mL    Total NET: -588 mL      05-21 @ 07:01  -  05-22 @ 00:27  --------------------------------------------------------  IN:    Heparin: 55 mL    Heparin Infusion: 77 mL    IV PiggyBack: 50 mL    Oral Fluid: 280 mL  Total IN: 462 mL    OUT:    Voided (mL): 450 mL  Total OUT: 450 mL    Total NET: 12 mL        --------------------------------------------------------------------------------------    EXAM  NEUROLOGY: Normal, NAD, alert, oriented x3, no focal deficits.  HEENT: Normocephalic, atraumatic, EOMI.   RESPIRATORY: Lungs clear to auscultation, Normal expansion/effort.   CARDIOVASCULAR: S1, S2.  Regular rate and rhythm.   GI/NUTRITION: Abdomen soft, Non-tender, Non-distended.   VASCULAR: Extremities warm, pink, well-perfused.  MSK: All extremities moving spontaneously without limitations.  SKIN: Good skin turgor, no skin breakdown.   Extremities: RLE discoloration, motor intact, decreased sensation. No dopplerable DP/PT signals    LABS  --------------------------------------------------------------------------------------   CBC (05-21 @ 11:01)                              13.7                           9.96    )----------------(  237        --    % Neutrophils, --    % Lymphocytes, ANC: --                                  46.5<H>              CBC (05-21 @ 07:30)                              13.4                           9.35    )----------------(  226        --    % Neutrophils, --    % Lymphocytes, ANC: --                                  46.3<H>                BMP (05-21 @ 11:01)             136     |  97      |  30<H> 		Ca++ --      Ca 8.9                ---------------------------------( 106<H>		Mg 2.0                3.9     |  25      |  1.37<H>			Ph 4.2     BMP (05-21 @ 07:24)             139     |  101     |  30<H> 		Ca++ --      Ca 9.2                ---------------------------------( 104<H>		Mg 2.1                4.8     |  24      |  1.44<H>			Ph 4.3       LFTs (05-21 @ 07:24)      TPro 8.0 / Alb 3.5 / TBili 0.7 / DBili -- / AST 15 / ALT 8<L> / AlkPhos 83    Coags (05-21 @ 07:35)  aPTT 73.3<H> / INR -- / PT --  Coags (05-20 @ 22:23)  aPTT 80.9<H> / INR -- / PT --    ABG (05-21 @ 12:05)      /  /  /  /  / %     Lactate:   1.3  ABG (05-21 @ 09:40)     7.37 / 48<H> / 74<L> / 28 / 1.7 / 94.8%     Lactate:       VBG (05-21 @ 12:05)     7.36 / 51<H> / 48<H> / 29 / 2.4 / 77.1%      --------------------------------------------------------------------------------------    IMAGING STUDIES:

## 2024-05-22 NOTE — PROGRESS NOTE ADULT - ASSESSMENT
83F h/o CAD (1 stent placed), COPD, HTN, HLD, PAF on amio and a/c , PAD s/p  right ilio-profunda and jump femoro-above knee popliteal bypass in summer 2018, brain aneurysm p/w 3d of RLE pain, numbness. States sxs started as b/l pain in soles of feet Monday night. Went to [facility] who dc'd with dx of plantar fasciitis. Today, R foot became numb and more painful. Also noted purple discoloration. Endorses rest pain, no wounds.       # PAD  - Pt with PAD to her RLE  - Planned for R AKA --> Unable to undergo surgery 5/20 due to concern with anesthesia and pulmonary status   - Pain control   - On Heparin gtt; Monitor Ptt and adjust as per normogram, monitor H/H  - Patient is deemed to be an elevated risk candidate for surgical intervention.   - Per Vascular surgery     # Fever  - Pt febrile to 100.7 rectally  - F/u BCx2 --> In lab  - Suggest to start patient on empiric ABX --> on Cefepime    - F/u RSV/Flu/Covid    - Trend CBC, Temp curve, VS and patient     # Acute Hypoxic Respiratory failure  - Per pulm, patient on 4L NC saturating 60%, placed on NRB on 5/20, transferred to SICU   - Pt already on AC with heparin gtt   - S/P Solumedrol 125 IV X 1, now on  20 Q8 per Pulm   - F/u TTE   - Monitor on continues pulse ox    # CAD   - On Heparin gtt; Monitor Ptt and adjust as per normogram, monitor H/H  - On Plavix and Statin therapies  - BP control  - TTE as noted with Grade II DD   - Monitor on tele  - Cardio follow up     # TOBIAS  - Likely SCHUYLER  - Monitor urine output   - Check bladder scan  - Avoid nephrotoxic medication  - PO/Oral hydration   - Cr Up-trending, continue to monitor and trend. Suggest gentle IVF for hydration. PO intake postoperatively     # A fib  - On Amiodarone  - Rate control - monitor on tele; monitor electrolytes    - On AC with heparin gtt  - Cardio follow up     # COPD  - On Bronchodilators  - On O2 supplementation; Maintain O2 > 88%    - Pulm eval appreciated; F/u recs    Abnormal CT Chest  - Pt with reported PET/CT outpatient due to enlarged lymphadenopathy  - CT C here with severe upper lobe centrilobar emphysema, enlarged Mediastinal LN, subcarinal node, 1cm supraclavicular LN, Splenomegaly   - Consider in versus outpatient biopsy to rule out lymphoma.  Follow up closely with Pulmonology      PPX      Discussed with Attending, Vascular surgery, Pulmonology

## 2024-05-22 NOTE — PROGRESS NOTE ADULT - ASSESSMENT
83F h/o CAD (1 stent placed), COPD, HTN, HLD, PAD s/p  right ilio-profunda and jump femoro-above knee popliteal bypass in summer 2018, brain aneurysm p/w 3d of RLE pain, numbness. States sxs started as b/l pain in soles of feet Monday night. Went to [facility] who dc'd with dx of plantar fasciitis. Today, R foot became numb and more painful. Also noted purple discoloration. Endorses rest pain, no wounds.     PLAN:   Neurologic:   - AOx4  - Pain management Dilaudid + Oxycodone + Tylenol    Respiratory:   - Duoneb  - Solumedrol IV  - Budesonide  - Tiotropoium 2.5MICROgm    Cardiovascular:   - MAP goal > 65     Gastrointestinal/Nutrition:   - Regular diet  - Bowel regimen (Miralax, senna)  - Protonix 40 mg QD    Renal/Genitourinary:   - Monitor urinary output  - Trend electrolytes and replete    Hematologic:   - Heparin gtt   - Monitor aPTT (60-90)  - ASA 81mg    Infectious Disease:   Lines/Tubes:    Endocrine:   - Monitor glucose levels     Disposition: SICU

## 2024-05-22 NOTE — DIETITIAN INITIAL EVALUATION ADULT - OTHER INFO
GI/Intake:   -NPO for OR   -Last BM documented ; bowel regimen ordered (Miralax, Senna)    Endo:  -Solu-medrol ordered   -No insulin noted     Renal:  -Hyperphosphatemic   -Hypermagnesemic     Resp:  -Hypoxic, Team stating likely COPD exacerbation   -Supplemental O2 via NC     MSK:  -OR today for AKA     Weight Hx:  -Current dosin pounds   -Per Pt: 180 pounds x >1 year ago; lowest weight 116 pounds (2023) --> unintentional weight loss

## 2024-05-22 NOTE — PROGRESS NOTE ADULT - ATTENDING COMMENTS
ON: lactate increase, received fluids    aaox3, speaking with team and pleasant  pain well controlled  budenoside home med and nebs  steroids x5 days  NC 4 lit , target SpO2 88%  AM CXR largely same   afib hx, amio  hypertensive, oral meds on hold  IV hydralazine   lact downtrended  NPO for OR  no BMs, on bowel regimen  voiding , neg pos 400cc/ adequte urine output  TOBIAS, stable now  torsemide home meds  hep drip, therapeutic PTT  asa daily  nl plt  afebrile, no Abx   on steroids, good glycemic control SSI  PT following  RLE: purple absent pulses     PIV

## 2024-05-22 NOTE — PROGRESS NOTE ADULT - SUBJECTIVE AND OBJECTIVE BOX
Name of Patient : ANGIE LOPES  MRN: [id number]  Date of visit: 05-22-24 @ 17:47      Subjective: Patient seen and examined. No new events except as noted.   Patient seen earlier this AM. In SICU. Hypoxia is improving. Patient on 2L NC. Denies shortness of breath   For OR today     REVIEW OF SYSTEMS:    CONSTITUTIONAL: Generalized weakness   EYES/ENT: No visual changes;  No vertigo or throat pain   NECK: No pain or stiffness  RESPIRATORY: + Hypoxia improving; Denies  shortness of breath  CARDIOVASCULAR: No chest pain or palpitations  GASTROINTESTINAL: No abdominal or epigastric pain. No nausea, vomiting, or hematemesis; No diarrhea or constipation. No melena or hematochezia.  GENITOURINARY: No dysuria, frequency or hematuria  NEUROLOGICAL: No numbness or weakness  SKIN/ MSK: LE Pain; No itching, burning, rashes, or lesions   All other review of systems is negative unless indicated above.    MEDICATIONS:  MEDICATIONS  (STANDING):  albuterol/ipratropium for Nebulization 3 milliLiter(s) Nebulizer every 6 hours  aMIOdarone    Tablet 200 milliGRAM(s) Oral daily  amLODIPine   Tablet 5 milliGRAM(s) Oral daily  aspirin enteric coated 81 milliGRAM(s) Oral daily  atorvastatin 40 milliGRAM(s) Oral at bedtime  budesonide 160 MICROgram(s)/formoterol 4.5 MICROgram(s) Inhaler 2 Puff(s) Inhalation two times a day  ceFAZolin   IVPB 500 milliGRAM(s) IV Intermittent every 8 hours  chlorhexidine 2% Cloths 1 Application(s) Topical <User Schedule>  insulin lispro (ADMELOG) corrective regimen sliding scale   SubCutaneous every 6 hours  melatonin 3 milliGRAM(s) Oral at bedtime  methylPREDNISolone sodium succinate Injectable 20 milliGRAM(s) IV Push every 8 hours  metoprolol succinate ER 25 milliGRAM(s) Oral daily  pantoprazole    Tablet 40 milliGRAM(s) Oral before breakfast  polyethylene glycol 3350 17 Gram(s) Oral daily  senna 2 Tablet(s) Oral at bedtime  tiotropium 2.5 MICROgram(s) Inhaler 2 Puff(s) Inhalation daily  torsemide 20 milliGRAM(s) Oral daily      PHYSICAL EXAM:  T(C): 36.4 (05-22-24 @ 16:01), Max: 36.9 (05-22-24 @ 07:00)  HR: 68 (05-22-24 @ 17:27) (46 - 79)  BP: 132/61 (05-22-24 @ 17:00) (100/59 - 179/86)  RR: 18 (05-22-24 @ 17:00) (14 - 30)  SpO2: 97% (05-22-24 @ 17:27) (89% - 100%)  Wt(kg): --  I&O's Summary    21 May 2024 07:01  -  22 May 2024 07:00  --------------------------------------------------------  IN: 1150 mL / OUT: 750 mL / NET: 400 mL    22 May 2024 07:01  -  22 May 2024 17:47  --------------------------------------------------------  IN: 66 mL / OUT: 525 mL / NET: -459 mL          Appearance: Awake, in bed, on NC 	  HEENT:  Eyes are open   Lymphatic: No lymphadenopathy grossly   Cardiovascular: Normal   Respiratory: + 2L NC; Decreased BS    Gastrointestinal:  Soft, Non-tender  Skin: No rashes,  warm to touch  Psychiatry:  Mood & affect appropriate  Musculoskeletal: No edema          05-21-24 @ 07:01  -  05-22-24 @ 07:00  --------------------------------------------------------  IN: 1150 mL / OUT: 750 mL / NET: 400 mL    05-22-24 @ 07:01  -  05-22-24 @ 17:47  --------------------------------------------------------  IN: 66 mL / OUT: 525 mL / NET: -459 mL                                  12.1   11.11 )-----------( 172      ( 22 May 2024 16:56 )             40.7               05-22    135  |  98  |  43<H>  ----------------------------<  147<H>  4.4   |  21<L>  |  2.23<H>    Ca    9.6      22 May 2024 04:01  Phos  5.5     05-22  Mg     2.9     05-22    TPro  8.0  /  Alb  3.5  /  TBili  0.7  /  DBili  x   /  AST  15  /  ALT  8<L>  /  AlkPhos  83  05-21    PT/INR - ( 22 May 2024 16:56 )   PT: 11.2 sec;   INR: 1.07 ratio         PTT - ( 22 May 2024 16:56 )  PTT:25.4 sec       CARDIAC MARKERS ( 22 May 2024 04:01 )  x     / x     / 49 U/L / x     / x                ABG - ( 21 May 2024 09:40 )  pH, Arterial: 7.37  pH, Blood: x     /  pCO2: 48    /  pO2: 74    / HCO3: 28    / Base Excess: 1.7   /  SaO2: 94.8              Urinalysis Basic - ( 22 May 2024 04:01 )    Color: x / Appearance: x / SG: x / pH: x  Gluc: 147 mg/dL / Ketone: x  / Bili: x / Urobili: x   Blood: x / Protein: x / Nitrite: x   Leuk Esterase: x / RBC: x / WBC x   Sq Epi: x / Non Sq Epi: x / Bacteria: x

## 2024-05-22 NOTE — PHYSICAL THERAPY INITIAL EVALUATION ADULT - PERTINENT HX OF CURRENT PROBLEM, REHAB EVAL
83F h/o CAD (1 stent placed), COPD, HTN, HLD, PAD s/p  right ilio-profunda and jump femoro-above knee popliteal bypass in summer 2018, brain aneurysm p/w 3d of RLE pain, numbness. States sxs started as b/l pain in soles of feet Monday night. Went to [facility] who dc'd with dx of plantar fasciitis. Today, R foot became numb and more painful. Also noted purple discoloration. Endorses rest pain, no wounds. CTChest: Indeterminate mediastinal and supraclavicular adenopathy. CTAngioAorta: Diffuse atherosclerotic changes.No aortic dissection.Right lower extremity:Occlusion of the right femoral-femoral and femoral-popliteal grafts.Reconstitution of the anterior tibial and peroneal arteries proximally with distal nonopacification.Left lower extremity:  Two-vessel runoff to the ankle.

## 2024-05-22 NOTE — DIETITIAN INITIAL EVALUATION ADULT - PERTINENT LABORATORY DATA
05-22    135  |  98  |  43<H>  ----------------------------<  147<H>  4.4   |  21<L>  |  2.23<H>    Ca    9.6      22 May 2024 04:01  Phos  5.5     05-22  Mg     2.9     05-22    TPro  8.0  /  Alb  3.5  /  TBili  0.7  /  DBili  x   /  AST  15  /  ALT  8<L>  /  AlkPhos  83  05-21

## 2024-05-22 NOTE — PROGRESS NOTE ADULT - NS ATTEND AMEND GEN_ALL_CORE FT
normal mood with appropriate affect , good eye contact
Pt care and plan discussed and reviewed with PA. Plan as outlined above edited by me to reflect our discussion. Advanced care planning/advanced directives discussed with patient/family. DNR status including forceful chest compressions to attempt to restart the heart, ventilator support/artificial breathing, electric shock, artificial nutrition, health care proxy, Molst form all discussed with pt. More than 50% of the visit was spent counseling and/or coordinating care by the attending physician.
Pt care and plan discussed and reviewed with PA. Plan as outlined above edited by me to reflect our discussion. Advanced care planning/advanced directives discussed with patient/family. DNR status including forceful chest compressions to attempt to restart the heart, ventilator support/artificial breathing, electric shock, artificial nutrition, health care proxy, Molst form all discussed with pt. More than 50% of the visit was spent counseling and/or coordinating care by the attending physician.
events noted  po2 was 74 on 100% non-rebreather this am  currently in sicu on 2 l nc with sat 92%  suggest TTE w bubble study  keep sat>90% w o2 as needed
no pulmonary contraindications to planned rt aka  consider spinal and if intubation required suggest fast track extubation to bipap as needed  maintain sat>88%  vianca myles

## 2024-05-22 NOTE — PHYSICAL THERAPY INITIAL EVALUATION ADULT - CRITERIA FOR SKILLED THERAPEUTIC INTERVENTIONS
impairments found Cyclosporine Pregnancy And Lactation Text: This medication is Pregnancy Category C and it isn't know if it is safe during pregnancy. This medication is excreted in breast milk.

## 2024-05-22 NOTE — DIETITIAN INITIAL EVALUATION ADULT - PERTINENT MEDS FT
MEDICATIONS  (STANDING):  albuterol/ipratropium for Nebulization 3 milliLiter(s) Nebulizer every 6 hours  aMIOdarone    Tablet 200 milliGRAM(s) Oral daily  amLODIPine   Tablet 5 milliGRAM(s) Oral daily  aspirin enteric coated 81 milliGRAM(s) Oral daily  atorvastatin 40 milliGRAM(s) Oral at bedtime  budesonide 160 MICROgram(s)/formoterol 4.5 MICROgram(s) Inhaler 2 Puff(s) Inhalation two times a day  chlorhexidine 2% Cloths 1 Application(s) Topical <User Schedule>  heparin  Infusion 11 Unit(s)/Hr (11 mL/Hr) IV Continuous <Continuous>  melatonin 3 milliGRAM(s) Oral at bedtime  methylPREDNISolone sodium succinate Injectable 20 milliGRAM(s) IV Push every 8 hours  metoprolol succinate ER 25 milliGRAM(s) Oral daily  pantoprazole    Tablet 40 milliGRAM(s) Oral before breakfast  polyethylene glycol 3350 17 Gram(s) Oral daily  senna 2 Tablet(s) Oral at bedtime  tiotropium 2.5 MICROgram(s) Inhaler 2 Puff(s) Inhalation daily  torsemide 20 milliGRAM(s) Oral daily    MEDICATIONS  (PRN):  acetaminophen     Tablet .. 975 milliGRAM(s) Oral every 6 hours PRN Mild Pain (1 - 3)  HYDROmorphone  Injectable 0.5 milliGRAM(s) IV Push every 4 hours PRN Severe Pain (7 - 10)  oxyCODONE    IR 5 milliGRAM(s) Oral every 4 hours PRN Moderate Pain (4 - 6)  oxyCODONE    IR 10 milliGRAM(s) Oral every 4 hours PRN Severe Pain (7 - 10)

## 2024-05-22 NOTE — PROGRESS NOTE ADULT - SUBJECTIVE AND OBJECTIVE BOX
Vascular Surgery Progress Note    SUBJECTIVE  The patient was seen and examined. No issues with breathing overnight, pt denies SOB. Pain controlled, afebrile w/ stable vitals.     OBJECTIVE  ___________________________________________________  VITAL SIGNS / I&O's   Vital Signs Last 24 Hrs  T(C): 36.9 (22 May 2024 07:00), Max: 38.2 (21 May 2024 09:51)  T(F): 98.5 (22 May 2024 07:00), Max: 100.7 (21 May 2024 09:51)  HR: 70 (22 May 2024 08:00) (46 - 74)  BP: 165/74 (22 May 2024 08:00) (100/59 - 175/80)  BP(mean): 106 (22 May 2024 08:00) (73 - 115)  RR: 22 (22 May 2024 08:00) (16 - 33)  SpO2: 97% (22 May 2024 08:00) (85% - 99%)    Parameters below as of 22 May 2024 07:00  Patient On (Oxygen Delivery Method): nasal cannula  O2 Flow (L/min): 4        21 May 2024 07:01  -  22 May 2024 07:00  --------------------------------------------------------  IN:    Heparin: 143 mL    Heparin Infusion: 77 mL    IV PiggyBack: 150 mL    Lactated Ringers Bolus: 500 mL    Oral Fluid: 280 mL  Total IN: 1150 mL    OUT:    Voided (mL): 750 mL  Total OUT: 750 mL    Total NET: 400 mL      22 May 2024 07:01  -  22 May 2024 08:37  --------------------------------------------------------  IN:    Heparin: 11 mL  Total IN: 11 mL    OUT:  Total OUT: 0 mL    Total NET: 11 mL        ___________________________________________________  PHYSICAL EXAM    Constitutional: Well developed, well nourished, NAD  Pulmonary: Symmetric chest rise bilaterally, no increased WOB on nasal cannula, saturating well  Gastrointestinal: Abdomen soft, nontender, nondistended  Extremities: RLE discoloration, motor intact, decreased sensation. No dopplerable DP/PT signals    ___________________________________________________  LABS                        12.6   10.00 )-----------( 191      ( 22 May 2024 00:50 )             42.4     22 May 2024 04:01    135    |  98     |  43     ----------------------------<  147    4.4     |  21     |  2.23     Ca    9.6        22 May 2024 04:01  Phos  5.5       22 May 2024 04:01  Mg     2.9       22 May 2024 04:01    TPro  8.0    /  Alb  3.5    /  TBili  0.7    /  DBili  x      /  AST  15     /  ALT  8      /  AlkPhos  83     21 May 2024 07:24    PT/INR - ( 22 May 2024 00:50 )   PT: 11.8 sec;   INR: 1.07 ratio         PTT - ( 22 May 2024 00:50 )  PTT:60.0 sec  CAPILLARY BLOOD GLUCOSE        CARDIAC MARKERS ( 22 May 2024 04:01 )  x     / x     / 49 U/L / x     / x          Urinalysis Basic - ( 22 May 2024 04:01 )    Color: x / Appearance: x / SG: x / pH: x  Gluc: 147 mg/dL / Ketone: x  / Bili: x / Urobili: x   Blood: x / Protein: x / Nitrite: x   Leuk Esterase: x / RBC: x / WBC x   Sq Epi: x / Non Sq Epi: x / Bacteria: x      ___________________________________________________  MICRO  Recent Cultures:    ___________________________________________________  MEDICATIONS  (STANDING):  albuterol/ipratropium for Nebulization 3 milliLiter(s) Nebulizer every 6 hours  aMIOdarone    Tablet 200 milliGRAM(s) Oral daily  amLODIPine   Tablet 5 milliGRAM(s) Oral daily  aspirin enteric coated 81 milliGRAM(s) Oral daily  atorvastatin 40 milliGRAM(s) Oral at bedtime  budesonide 160 MICROgram(s)/formoterol 4.5 MICROgram(s) Inhaler 2 Puff(s) Inhalation two times a day  chlorhexidine 2% Cloths 1 Application(s) Topical <User Schedule>  heparin  Infusion 11 Unit(s)/Hr (11 mL/Hr) IV Continuous <Continuous>  melatonin 3 milliGRAM(s) Oral at bedtime  methylPREDNISolone sodium succinate Injectable 20 milliGRAM(s) IV Push every 8 hours  metoprolol succinate ER 25 milliGRAM(s) Oral daily  pantoprazole    Tablet 40 milliGRAM(s) Oral before breakfast  polyethylene glycol 3350 17 Gram(s) Oral daily  senna 2 Tablet(s) Oral at bedtime  tiotropium 2.5 MICROgram(s) Inhaler 2 Puff(s) Inhalation daily  torsemide 20 milliGRAM(s) Oral daily    MEDICATIONS  (PRN):  acetaminophen     Tablet .. 975 milliGRAM(s) Oral every 6 hours PRN Mild Pain (1 - 3)  HYDROmorphone  Injectable 0.5 milliGRAM(s) IV Push every 4 hours PRN Severe Pain (7 - 10)  oxyCODONE    IR 10 milliGRAM(s) Oral every 4 hours PRN Severe Pain (7 - 10)  oxyCODONE    IR 5 milliGRAM(s) Oral every 4 hours PRN Moderate Pain (4 - 6)

## 2024-05-22 NOTE — PROGRESS NOTE ADULT - SUBJECTIVE AND OBJECTIVE BOX
Subjective: Patient seen and examined. No new events except as noted.     SUBJECTIVE/ROS:  more pain and darker skin , gangrene in right lower ext   No chest pain, dyspnea, palpitation, or dizziness.       MEDICATIONS:  MEDICATIONS  (STANDING):  albuterol/ipratropium for Nebulization 3 milliLiter(s) Nebulizer every 6 hours  aMIOdarone    Tablet 200 milliGRAM(s) Oral daily  amLODIPine   Tablet 5 milliGRAM(s) Oral daily  aspirin enteric coated 81 milliGRAM(s) Oral daily  atorvastatin 40 milliGRAM(s) Oral at bedtime  budesonide 160 MICROgram(s)/formoterol 4.5 MICROgram(s) Inhaler 2 Puff(s) Inhalation two times a day  chlorhexidine 2% Cloths 1 Application(s) Topical <User Schedule>  heparin  Infusion 11 Unit(s)/Hr (11 mL/Hr) IV Continuous <Continuous>  melatonin 3 milliGRAM(s) Oral at bedtime  methylPREDNISolone sodium succinate Injectable 20 milliGRAM(s) IV Push every 8 hours  metoprolol succinate ER 25 milliGRAM(s) Oral daily  pantoprazole    Tablet 40 milliGRAM(s) Oral before breakfast  polyethylene glycol 3350 17 Gram(s) Oral daily  senna 2 Tablet(s) Oral at bedtime  tiotropium 2.5 MICROgram(s) Inhaler 2 Puff(s) Inhalation daily  torsemide 20 milliGRAM(s) Oral daily      PHYSICAL EXAM:  T(C): 36.9 (05-22-24 @ 07:00), Max: 38.2 (05-21-24 @ 09:51)  HR: 70 (05-22-24 @ 08:00) (46 - 74)  BP: 165/74 (05-22-24 @ 08:00) (100/59 - 175/80)  RR: 22 (05-22-24 @ 08:00) (16 - 33)  SpO2: 97% (05-22-24 @ 08:00) (85% - 99%)  Wt(kg): --  I&O's Summary    21 May 2024 07:01  -  22 May 2024 07:00  --------------------------------------------------------  IN: 1150 mL / OUT: 750 mL / NET: 400 mL    22 May 2024 07:01  -  22 May 2024 08:33  --------------------------------------------------------  IN: 11 mL / OUT: 0 mL / NET: 11 mL            JVP: Normal  Neck: supple  Lung: clear   CV: S1 S2 , Murmur:  Abd: soft  Ext: No edema  neuro: Awake / alert  Psych: flat affect  Skin: right lower ext dark    LABS/DATA:    CARDIAC MARKERS:  CARDIAC MARKERS ( 22 May 2024 04:01 )  x     / x     / 49 U/L / x     / x                                    12.6   10.00 )-----------( 191      ( 22 May 2024 00:50 )             42.4     05-22    135  |  98  |  43<H>  ----------------------------<  147<H>  4.4   |  21<L>  |  2.23<H>    Ca    9.6      22 May 2024 04:01  Phos  5.5     05-22  Mg     2.9     05-22    TPro  8.0  /  Alb  3.5  /  TBili  0.7  /  DBili  x   /  AST  15  /  ALT  8<L>  /  AlkPhos  83  05-21    proBNP:   Lipid Profile:   HgA1c:   TSH:     TELE:  EKG:

## 2024-05-22 NOTE — PROGRESS NOTE ADULT - ASSESSMENT
83F pmhx of prior RLE bypass graft presenting with Zac 2 acute limb ischemia. Right AKA cancelled on 5/20 due to desaturation, now transferred to SICU 5/21 after another episode of desaturation    PLAN:   - Plan for Right AKA on today 5/22     - Consent in chart      - Patient will receive spinal anesthesia; no general anesthesia  - Continue to monitor vascular exam  - Pain control with PO Tylenol/Oxy PRN  - Diet: Reg/DASH diet  - VTE ppx: Hep gtt  - Appreciate care per SICU      Vascular Surgery   [id number]

## 2024-05-22 NOTE — PHYSICAL THERAPY INITIAL EVALUATION ADULT - ACTIVE RANGE OF MOTION EXAMINATION, REHAB EVAL
pain in RLE/bilateral upper extremity Active ROM was WFL (within functional limits)/bilateral  lower extremity Active ROM was WFL (within functional limits)/deficits as listed below

## 2024-05-22 NOTE — PHYSICAL THERAPY INITIAL EVALUATION ADULT - ADDITIONAL COMMENTS
Pt lives alone in private home however will be staying with daughter when discharged; daughter lives in private home with 2 steps to enter and first-floor set-up with walk-in shower; pt independent prior to admission.

## 2024-05-22 NOTE — CHART NOTE - NSCHARTNOTEFT_GEN_A_CORE
CC:    HPI: Called by RN to evaluate patient for . Patient seen and examined at the bedside. Denies fevers/chills, weakness, diaphoresis, headaches, dizziness, syncope, paresthesias, chest pain, palpitations, dyspnea, abdominal pain, N/V/D.     Vital Signs Last 24 Hrs  T(C): 38.2 (21 May 2024 09:51), Max: 38.2 (21 May 2024 09:51)  T(F): 100.7 (21 May 2024 09:51), Max: 100.7 (21 May 2024 09:51)  HR: 59 (21 May 2024 09:18) (57 - 72)  BP: 160/68 (21 May 2024 09:18) (134/62 - 163/73)  BP(mean): 111 (20 May 2024 12:09) (111 - 111)  RR: 18 (21 May 2024 09:51) (18 - 19)  SpO2: 92% (21 May 2024 09:51) (85% - 98%)    Parameters below as of 21 May 2024 09:51  Patient On (Oxygen Delivery Method): mask, nonrebreather  O2 Flow (L/min): 15      PHYSICAL EXAM:  General: NAD, A&Ox3  Respiratory: Lungs clear to auscultation bilaterally. No wheezes, rales, rhonchi. Normal respiratory effort.   Cardiovascular: S1, S2 present. Regular rate and rhythm. No murmurs, rubs, or gallops  Gastrointestinal: BS x4 normoactive. Soft, non-tender, non-distended.   Extremities: 2+ peripheral pulses. Warm to touch. No edema, cyanosis.    A/P  HPI:  Patient is a 83y old  Female who presents with a chief complaint of R leg pain    HPI: 83F h/o CAD (1 stent placed), COPD, HTN, HLD, PAD s/p  right ilio-profunda and jump femoro-above knee popliteal bypass in summer 2018, brain aneurysm p/w 3d of RLE pain, numbness. States sxs started as b/l pain in soles of feet Monday night. Went to [facility] who dc'd with dx of plantar fasciitis. Today, R foot became numb and more painful. Also noted purple discoloration. Endorses rest pain, no wounds.         PAST MEDICAL & SURGICAL HISTORY:  CAD (coronary artery disease)      History of COPD      PVD (peripheral vascular disease)        FAMILY HISTORY:    [X] Family history not pertinent as reviewed with the patient and family    SOCIAL HISTORY:      ALLERGIES: No Known Allergies      ROS: 10-system review is otherwise negative except HPI above.      T(C): 36.9 (05-16-24 @ 17:20), Max: 36.9 (05-16-24 @ 17:20)  HR: 66 (05-16-24 @ 17:20) (66 - 66)  BP: 169/90 (05-16-24 @ 17:20) (169/90 - 169/90)  RR: 20 (05-16-24 @ 17:20) (20 - 20)  SpO2: 92% (05-16-24 @ 17:20) (92% - 92%)    PHYSICAL EXAM  GENERAL: NAD, lying in bed comfortably  CHEST: nonlabored, no increased WOB  ABDOMEN: Soft, Nontender, Nondistended.  EXTREMITIES: RLE discoloration, motor intact, decreased sensation. Dopplerable triphasic femoral signal and monophasic popliteal on RLE.   NERVOUS SYSTEM:  Alert & Oriented X3      OBJECTIVE  No Known Allergies    I&O's Summary    I&O's Detail    LABS                        14.3   10.58 )-----------( 232      ( 16 May 2024 18:35 )             47.2     05-16    140  |  104  |  32<H>  ----------------------------<  97  3.7   |  22  |  1.34<H>    Ca    8.8      16 May 2024 18:35    TPro  8.5<H>  /  Alb  3.5  /  TBili  0.7  /  DBili  x   /  AST  14  /  ALT  9<L>  /  AlkPhos  93  05-16    PT/INR - ( 16 May 2024 18:35 )   PT: 14.6 sec;   INR: 1.40 ratio         PTT - ( 16 May 2024 18:35 )  PTT:32.5 sec  Urinalysis Basic - ( 16 May 2024 18:35 )    Color: x / Appearance: x / SG: x / pH: x  Gluc: 97 mg/dL / Ketone: x  / Bili: x / Urobili: x   Blood: x / Protein: x / Nitrite: x   Leuk Esterase: x / RBC: x / WBC x   Sq Epi: x / Non Sq Epi: x / Bacteria: x          IMAGING   (16 May 2024 19:56)      #  -Repeat vitals were stable  -Discussed with RN  -Will continue to monitor  -Will endorse to AM team      Radha Norris PA-C  #
Consulted to provide anesthesia for this patient for her AKA.  Per patient and Dr. Aquino, patient required BIPAP and had a difficult post op course with her last surgery, mainly due to pulmonary issues.  Known to have advanced COPD requiring home O2.  Currently, she is saturating high 80% on 3L NC, tachypnic in SDA, but in no acute distress.  Has been seen, and cleared, by pulm. Received her nebs today. Gas samples noted for hypercapnia, noted to be retaining co2 by pulm.    I had a full discussion with the patient, patient family, and surgical team.  She is currently not a candidate for neuraxial anesthesia due to her anti plt agents (received plavix yesterday, and is on another anitthrombotic agent as well).  She is a candidate for general anesthesia, but there is a risk of requiring BIPAP post surgery, as well as continued intubation. I discussed this in depth.    The patient and surgeon expressed interest in a spinal.  I informed the patient and surgical team that this is a possibility, as long as her anti thrombotic agents can be held safely, and she awaits the appropriate amount of time after the last dose of anti thrombotic agent.  We are also available to preform general anesthesia, having already discussed the risks and benefits in depth.
STATUS POST:  R AKA    POST OPERATIVE DAY #: 0    SUBJECTIVE: Patient seen and examined in SICU. Family at bedside. Patient reports she is feeling well, has some discomfort more from phantom sensation than pain. Otherwise denies CP, SOB, N/V, dizziness, headache, fevers.       Vital Signs Last 24 Hrs  T(C): 36.4 (22 May 2024 16:01), Max: 36.9 (22 May 2024 07:00)  T(F): 97.5 (22 May 2024 16:01), Max: 98.5 (22 May 2024 07:00)  HR: 75 (22 May 2024 19:00) (46 - 79)  BP: 125/59 (22 May 2024 18:00) (109/57 - 179/86)  BP(mean): 85 (22 May 2024 18:00) (78 - 124)  RR: 25 (22 May 2024 19:00) (14 - 30)  SpO2: 96% (22 May 2024 19:00) (91% - 100%)    Parameters below as of 22 May 2024 17:27  Patient On (Oxygen Delivery Method): nasal cannula      I&O's Summary    21 May 2024 07:01  -  22 May 2024 07:00  --------------------------------------------------------  IN: 1150 mL / OUT: 750 mL / NET: 400 mL    22 May 2024 07:01  -  22 May 2024 20:28  --------------------------------------------------------  IN: 306 mL / OUT: 630 mL / NET: -324 mL      I&O's Detail    21 May 2024 07:01  -  22 May 2024 07:00  --------------------------------------------------------  IN:    Heparin: 143 mL    Heparin Infusion: 77 mL    IV PiggyBack: 150 mL    Lactated Ringers Bolus: 500 mL    Oral Fluid: 280 mL  Total IN: 1150 mL    OUT:    Voided (mL): 750 mL  Total OUT: 750 mL    Total NET: 400 mL      22 May 2024 07:01  -  22 May 2024 20:28  --------------------------------------------------------  IN:    Heparin: 66 mL    Oral Fluid: 240 mL  Total IN: 306 mL    OUT:    Indwelling Catheter - Urethral (mL): 280 mL    Voided (mL): 350 mL  Total OUT: 630 mL    Total NET: -324 mL          MEDICATIONS  (STANDING):  albuterol/ipratropium for Nebulization 3 milliLiter(s) Nebulizer every 6 hours  aMIOdarone    Tablet 200 milliGRAM(s) Oral daily  amLODIPine   Tablet 5 milliGRAM(s) Oral daily  aspirin enteric coated 81 milliGRAM(s) Oral daily  atorvastatin 40 milliGRAM(s) Oral at bedtime  budesonide 160 MICROgram(s)/formoterol 4.5 MICROgram(s) Inhaler 2 Puff(s) Inhalation two times a day  ceFAZolin   IVPB 500 milliGRAM(s) IV Intermittent every 8 hours  chlorhexidine 2% Cloths 1 Application(s) Topical <User Schedule>  insulin lispro (ADMELOG) corrective regimen sliding scale   SubCutaneous every 6 hours  melatonin 3 milliGRAM(s) Oral at bedtime  methylPREDNISolone sodium succinate Injectable 20 milliGRAM(s) IV Push every 8 hours  metoprolol succinate ER 25 milliGRAM(s) Oral daily  pantoprazole    Tablet 40 milliGRAM(s) Oral before breakfast  polyethylene glycol 3350 17 Gram(s) Oral daily  senna 2 Tablet(s) Oral at bedtime  tiotropium 2.5 MICROgram(s) Inhaler 2 Puff(s) Inhalation daily  torsemide 20 milliGRAM(s) Oral daily    MEDICATIONS  (PRN):  acetaminophen     Tablet .. 975 milliGRAM(s) Oral every 6 hours PRN Mild Pain (1 - 3)  HYDROmorphone  Injectable 0.5 milliGRAM(s) IV Push every 4 hours PRN Severe Pain (7 - 10)  oxyCODONE    IR 10 milliGRAM(s) Oral every 4 hours PRN Severe Pain (7 - 10)  oxyCODONE    IR 5 milliGRAM(s) Oral every 4 hours PRN Moderate Pain (4 - 6)      Physical Exam  Gen: NAD, A&Ox3  Pulm: Nonlabored breathing, saturating well on NC  CV: NSR on tele  Abd: Soft, NT, ND  Extremities: RLE s/p AKA. Ace wraps in place, C/D/I. LLE WWP    LABS:                        12.1   11.11 )-----------( 172      ( 22 May 2024 16:56 )             40.7     05-22    137  |  100  |  46<H>  ----------------------------<  157<H>  4.1   |  22  |  1.82<H>    Ca    8.7      22 May 2024 16:56  Phos  4.9     05-22  Mg     2.6     05-22    TPro  8.0  /  Alb  3.5  /  TBili  0.7  /  DBili  x   /  AST  15  /  ALT  8<L>  /  AlkPhos  83  05-21    PT/INR - ( 22 May 2024 16:56 )   PT: 11.2 sec;   INR: 1.07 ratio         PTT - ( 22 May 2024 16:56 )  PTT:25.4 sec  Urinalysis Basic - ( 22 May 2024 16:56 )    Color: x / Appearance: x / SG: x / pH: x  Gluc: 157 mg/dL / Ketone: x  / Bili: x / Urobili: x   Blood: x / Protein: x / Nitrite: x   Leuk Esterase: x / RBC: x / WBC x   Sq Epi: x / Non Sq Epi: x / Bacteria: x      A/P: 83y Female now several hours s/p right AKA. Recovering appropriately.     Plan:  - Regular diet  - Pain control  - Home meds as appropriate  - Social work/holistic care consult  - PT eval  - DVT ppx  - Appreciate excellent SICU care    Vascular Surgery  [id number]

## 2024-05-22 NOTE — PHYSICAL THERAPY INITIAL EVALUATION ADULT - GENERAL OBSERVATIONS, REHAB EVAL
pt received seated in chair in NAD  +2L O2 NC, ace wrap to R residual limb, IV lock, ICU monitoring, le catheter

## 2024-05-22 NOTE — DIETITIAN NUTRITION RISK NOTIFICATION - TREATMENT: THE FOLLOWING DIET HAS BEEN RECOMMENDED
Diet, NPO after Midnight:      NPO Start Date: 21-May-2024,   NPO Start Time: 23:59 (05-21-24 @ 18:26) [Active]  Diet, Regular:   DASH/TLC {Sodium & Cholesterol Restricted} (DASH) (05-16-24 @ 21:32) [Active]

## 2024-05-22 NOTE — PROGRESS NOTE ADULT - ASSESSMENT
84 y/o F with PMH of CAD (s/p stent), COPD on home o2, HTN, HLD, PAD, brain aneurysm. Presents with RLE pain, numbness. Plan for vascular intervention, possible amputation. Pulmonary called to consult for COPD.

## 2024-05-23 PROBLEM — Z00.00 ENCOUNTER FOR PREVENTIVE HEALTH EXAMINATION: Status: ACTIVE | Noted: 2024-05-23

## 2024-05-23 LAB
ANION GAP SERPL CALC-SCNC: 17 MMOL/L — SIGNIFICANT CHANGE UP (ref 5–17)
APTT BLD: 24.1 SEC — LOW (ref 24.5–35.6)
BASE EXCESS BLDV CALC-SCNC: -2 MMOL/L — SIGNIFICANT CHANGE UP (ref -2–3)
BUN SERPL-MCNC: 54 MG/DL — HIGH (ref 7–23)
CA-I SERPL-SCNC: 0.99 MMOL/L — LOW (ref 1.15–1.33)
CALCIUM SERPL-MCNC: 8.8 MG/DL — SIGNIFICANT CHANGE UP (ref 8.4–10.5)
CHLORIDE BLDV-SCNC: 102 MMOL/L — SIGNIFICANT CHANGE UP (ref 96–108)
CHLORIDE SERPL-SCNC: 101 MMOL/L — SIGNIFICANT CHANGE UP (ref 96–108)
CO2 BLDV-SCNC: 25 MMOL/L — SIGNIFICANT CHANGE UP (ref 22–26)
CO2 SERPL-SCNC: 20 MMOL/L — LOW (ref 22–31)
CREAT SERPL-MCNC: 1.93 MG/DL — HIGH (ref 0.5–1.3)
EGFR: 26 ML/MIN/1.73M2 — LOW
GAS PNL BLDV: 135 MMOL/L — LOW (ref 136–145)
GAS PNL BLDV: SIGNIFICANT CHANGE UP
GLUCOSE BLDC GLUCOMTR-MCNC: 151 MG/DL — HIGH (ref 70–99)
GLUCOSE BLDC GLUCOMTR-MCNC: 157 MG/DL — HIGH (ref 70–99)
GLUCOSE BLDC GLUCOMTR-MCNC: 166 MG/DL — HIGH (ref 70–99)
GLUCOSE BLDC GLUCOMTR-MCNC: 236 MG/DL — HIGH (ref 70–99)
GLUCOSE BLDV-MCNC: 193 MG/DL — HIGH (ref 70–99)
GLUCOSE SERPL-MCNC: 186 MG/DL — HIGH (ref 70–99)
HCO3 BLDV-SCNC: 24 MMOL/L — SIGNIFICANT CHANGE UP (ref 22–29)
HCT VFR BLD CALC: 39.1 % — SIGNIFICANT CHANGE UP (ref 34.5–45)
HCT VFR BLDA CALC: 37 % — SIGNIFICANT CHANGE UP (ref 34.5–46.5)
HGB BLD CALC-MCNC: 12.3 G/DL — SIGNIFICANT CHANGE UP (ref 11.7–16.1)
HGB BLD-MCNC: 11.7 G/DL — SIGNIFICANT CHANGE UP (ref 11.5–15.5)
HOROWITZ INDEX BLDV+IHG-RTO: SIGNIFICANT CHANGE UP
INR BLD: 1.11 RATIO — SIGNIFICANT CHANGE UP (ref 0.85–1.18)
LACTATE BLDV-MCNC: 2.2 MMOL/L — HIGH (ref 0.5–2)
MAGNESIUM SERPL-MCNC: 2.7 MG/DL — HIGH (ref 1.6–2.6)
MCHC RBC-ENTMCNC: 23.1 PG — LOW (ref 27–34)
MCHC RBC-ENTMCNC: 29.9 GM/DL — LOW (ref 32–36)
MCV RBC AUTO: 77.1 FL — LOW (ref 80–100)
NRBC # BLD: 0 /100 WBCS — SIGNIFICANT CHANGE UP (ref 0–0)
PCO2 BLDV: 43 MMHG — HIGH (ref 39–42)
PH BLDV: 7.35 — SIGNIFICANT CHANGE UP (ref 7.32–7.43)
PHOSPHATE SERPL-MCNC: 4.4 MG/DL — SIGNIFICANT CHANGE UP (ref 2.5–4.5)
PLATELET # BLD AUTO: 182 K/UL — SIGNIFICANT CHANGE UP (ref 150–400)
PO2 BLDV: 63 MMHG — HIGH (ref 25–45)
POTASSIUM BLDV-SCNC: 4.2 MMOL/L — SIGNIFICANT CHANGE UP (ref 3.5–5.1)
POTASSIUM SERPL-MCNC: 4.1 MMOL/L — SIGNIFICANT CHANGE UP (ref 3.5–5.3)
POTASSIUM SERPL-SCNC: 4.1 MMOL/L — SIGNIFICANT CHANGE UP (ref 3.5–5.3)
PROTHROM AB SERPL-ACNC: 11.6 SEC — SIGNIFICANT CHANGE UP (ref 9.5–13)
RBC # BLD: 5.07 M/UL — SIGNIFICANT CHANGE UP (ref 3.8–5.2)
RBC # FLD: 20.6 % — HIGH (ref 10.3–14.5)
SAO2 % BLDV: 91.1 % — HIGH (ref 67–88)
SODIUM SERPL-SCNC: 138 MMOL/L — SIGNIFICANT CHANGE UP (ref 135–145)
WBC # BLD: 15.2 K/UL — HIGH (ref 3.8–10.5)
WBC # FLD AUTO: 15.2 K/UL — HIGH (ref 3.8–10.5)

## 2024-05-23 PROCEDURE — 71045 X-RAY EXAM CHEST 1 VIEW: CPT | Mod: 26

## 2024-05-23 PROCEDURE — 99233 SBSQ HOSP IP/OBS HIGH 50: CPT

## 2024-05-23 RX ORDER — APIXABAN 2.5 MG/1
2.5 TABLET, FILM COATED ORAL EVERY 12 HOURS
Refills: 0 | Status: DISCONTINUED | OUTPATIENT
Start: 2024-05-23 | End: 2024-05-23

## 2024-05-23 RX ORDER — POLYETHYLENE GLYCOL 3350 17 G/17G
17 POWDER, FOR SOLUTION ORAL EVERY 12 HOURS
Refills: 0 | Status: DISCONTINUED | OUTPATIENT
Start: 2024-05-23 | End: 2024-05-28

## 2024-05-23 RX ORDER — GABAPENTIN 400 MG/1
100 CAPSULE ORAL THREE TIMES A DAY
Refills: 0 | Status: DISCONTINUED | OUTPATIENT
Start: 2024-05-23 | End: 2024-05-24

## 2024-05-23 RX ORDER — CALCIUM GLUCONATE 100 MG/ML
2 VIAL (ML) INTRAVENOUS ONCE
Refills: 0 | Status: COMPLETED | OUTPATIENT
Start: 2024-05-23 | End: 2024-05-23

## 2024-05-23 RX ADMIN — ATORVASTATIN CALCIUM 40 MILLIGRAM(S): 80 TABLET, FILM COATED ORAL at 21:35

## 2024-05-23 RX ADMIN — Medication 200 GRAM(S): at 04:29

## 2024-05-23 RX ADMIN — OXYCODONE HYDROCHLORIDE 5 MILLIGRAM(S): 5 TABLET ORAL at 06:02

## 2024-05-23 RX ADMIN — Medication 100 MILLIGRAM(S): at 13:58

## 2024-05-23 RX ADMIN — Medication 81 MILLIGRAM(S): at 11:29

## 2024-05-23 RX ADMIN — Medication 20 MILLIGRAM(S): at 05:57

## 2024-05-23 RX ADMIN — CHLORHEXIDINE GLUCONATE 1 APPLICATION(S): 213 SOLUTION TOPICAL at 06:51

## 2024-05-23 RX ADMIN — OXYCODONE HYDROCHLORIDE 5 MILLIGRAM(S): 5 TABLET ORAL at 15:17

## 2024-05-23 RX ADMIN — POLYETHYLENE GLYCOL 3350 17 GRAM(S): 17 POWDER, FOR SOLUTION ORAL at 11:28

## 2024-05-23 RX ADMIN — Medication 20 MILLIGRAM(S): at 04:29

## 2024-05-23 RX ADMIN — Medication 3 MILLILITER(S): at 23:27

## 2024-05-23 RX ADMIN — Medication 4: at 00:08

## 2024-05-23 RX ADMIN — Medication 3 MILLIGRAM(S): at 21:35

## 2024-05-23 RX ADMIN — AMIODARONE HYDROCHLORIDE 200 MILLIGRAM(S): 400 TABLET ORAL at 05:57

## 2024-05-23 RX ADMIN — HYDROMORPHONE HYDROCHLORIDE 0.5 MILLIGRAM(S): 2 INJECTION INTRAMUSCULAR; INTRAVENOUS; SUBCUTANEOUS at 06:15

## 2024-05-23 RX ADMIN — Medication 3 MILLILITER(S): at 11:35

## 2024-05-23 RX ADMIN — HYDROMORPHONE HYDROCHLORIDE 0.5 MILLIGRAM(S): 2 INJECTION INTRAMUSCULAR; INTRAVENOUS; SUBCUTANEOUS at 06:50

## 2024-05-23 RX ADMIN — PANTOPRAZOLE SODIUM 40 MILLIGRAM(S): 20 TABLET, DELAYED RELEASE ORAL at 05:57

## 2024-05-23 RX ADMIN — SENNA PLUS 2 TABLET(S): 8.6 TABLET ORAL at 21:35

## 2024-05-23 RX ADMIN — Medication 100 MILLIGRAM(S): at 17:18

## 2024-05-23 RX ADMIN — BUDESONIDE AND FORMOTEROL FUMARATE DIHYDRATE 2 PUFF(S): 160; 4.5 AEROSOL RESPIRATORY (INHALATION) at 05:13

## 2024-05-23 RX ADMIN — Medication 100 MILLIGRAM(S): at 05:57

## 2024-05-23 RX ADMIN — Medication 50 MILLIGRAM(S): at 11:28

## 2024-05-23 RX ADMIN — Medication 25 MILLIGRAM(S): at 05:57

## 2024-05-23 RX ADMIN — Medication 3 MILLILITER(S): at 05:12

## 2024-05-23 RX ADMIN — HYDROMORPHONE HYDROCHLORIDE 0.5 MILLIGRAM(S): 2 INJECTION INTRAMUSCULAR; INTRAVENOUS; SUBCUTANEOUS at 00:02

## 2024-05-23 RX ADMIN — AMLODIPINE BESYLATE 5 MILLIGRAM(S): 2.5 TABLET ORAL at 05:57

## 2024-05-23 RX ADMIN — OXYCODONE HYDROCHLORIDE 5 MILLIGRAM(S): 5 TABLET ORAL at 16:17

## 2024-05-23 RX ADMIN — GABAPENTIN 100 MILLIGRAM(S): 400 CAPSULE ORAL at 21:35

## 2024-05-23 RX ADMIN — Medication 3 MILLILITER(S): at 17:30

## 2024-05-23 RX ADMIN — OXYCODONE HYDROCHLORIDE 5 MILLIGRAM(S): 5 TABLET ORAL at 06:45

## 2024-05-23 RX ADMIN — OXYCODONE HYDROCHLORIDE 5 MILLIGRAM(S): 5 TABLET ORAL at 23:26

## 2024-05-23 RX ADMIN — TIOTROPIUM BROMIDE 2 PUFF(S): 18 CAPSULE ORAL; RESPIRATORY (INHALATION) at 11:47

## 2024-05-23 RX ADMIN — BUDESONIDE AND FORMOTEROL FUMARATE DIHYDRATE 2 PUFF(S): 160; 4.5 AEROSOL RESPIRATORY (INHALATION) at 17:49

## 2024-05-23 RX ADMIN — Medication 2: at 06:00

## 2024-05-23 RX ADMIN — Medication 2: at 17:18

## 2024-05-23 RX ADMIN — POLYETHYLENE GLYCOL 3350 17 GRAM(S): 17 POWDER, FOR SOLUTION ORAL at 23:26

## 2024-05-23 RX ADMIN — Medication 4: at 23:26

## 2024-05-23 RX ADMIN — GABAPENTIN 100 MILLIGRAM(S): 400 CAPSULE ORAL at 13:58

## 2024-05-23 RX ADMIN — Medication 2: at 11:39

## 2024-05-23 NOTE — PROGRESS NOTE ADULT - SUBJECTIVE AND OBJECTIVE BOX
Vascular Surgery Daily Progress Note    SUBJECTIVE:  Pt seen and examined, and is resting comfortably in the chair. Reports pain at amputation site. NAEO.    OBJECTIVE:  Vital Signs Last 24 Hrs  T(C): 36.9 (23 May 2024 07:00), Max: 36.9 (22 May 2024 19:00)  T(F): 98.5 (23 May 2024 07:00), Max: 98.5 (22 May 2024 19:00)  HR: 61 (23 May 2024 10:00) (61 - 79)  BP: 147/69 (23 May 2024 10:00) (115/62 - 172/78)  BP(mean): 99 (23 May 2024 10:00) (79 - 112)  RR: 26 (23 May 2024 10:00) (11 - 31)  SpO2: 94% (23 May 2024 10:00) (92% - 98%)    Parameters below as of 23 May 2024 10:00  Patient On (Oxygen Delivery Method): room air        I&O's Detail    22 May 2024 07:01  -  23 May 2024 07:00  --------------------------------------------------------  IN:    Heparin: 66 mL    IV PiggyBack: 200 mL    Oral Fluid: 640 mL  Total IN: 906 mL    OUT:    Indwelling Catheter - Urethral (mL): 840 mL    Voided (mL): 350 mL  Total OUT: 1190 mL    Total NET: -284 mL      23 May 2024 07:01  -  23 May 2024 11:11  --------------------------------------------------------  IN:    Oral Fluid: 200 mL  Total IN: 200 mL    OUT:    Indwelling Catheter - Urethral (mL): 400 mL  Total OUT: 400 mL    Total NET: -200 mL        Exam:  GEN: NAD  HEENT: atraumatic, normocephalic  CV: pulse present regularly  RESP: no increased work of breathing, no use of accessory muscles, on 4L NC  GI/ABD: soft, nontender, nondistended  EXTREMITIES: RLE AKA, dressings c/d/i                        11.7   15.20 )-----------( 182      ( 23 May 2024 00:17 )             39.1       05-23    138  |  101  |  54<H>  ----------------------------<  186<H>  4.1   |  20<L>  |  1.93<H>    Ca    8.8      23 May 2024 00:16  Phos  4.4     05-23  Mg     2.7     05-23        PT/INR - ( 23 May 2024 00:17 )   PT: 11.6 sec;   INR: 1.11 ratio         PTT - ( 23 May 2024 00:17 )  PTT:24.1 sec

## 2024-05-23 NOTE — PROGRESS NOTE ADULT - ATTENDING COMMENTS
ON: s/p amputation    aaox3, speaking with team and pleasant  pain well controlled  started gabapentin 100 tid  budenoside home med and nebs  steroids x5 days  NC 1 lit , target SpO2 88%  AM CXR unchanged  afib hx, amio  hypertensive, oral home meds started amio and metop  lact clear  reg diet  on bowel regimen, increase miralax add suppository  no BM  dc le, TOV  balance even adequate urine output  TOBIAS ,slightly worse   torsemide home meds  hep drip on hold, restart apixaban 2.5 bid  asa daily  nl plt  afebrile, doxy 5 days for COPD flare, cefazolin periop  on steroids, good glycemic control SSI  PT following  Rt AKA    PIV

## 2024-05-23 NOTE — PROGRESS NOTE ADULT - ASSESSMENT
83F pmhx of prior RLE bypass graft presenting with Zac 2 acute limb ischemia. Right AKA cancelled on 5/20 due to desaturation, now transferred to SICU 5/21 after another episode of desaturation. Now s/p R AKA (5/22).    PLAN:   - Diet: Reg/DASH diet  - VTE ppx: Eliquis  - Home meds as appropriate  - Keep le  - Abx  - Appreciate care per SICU    Vascular Surgery  [phone]

## 2024-05-23 NOTE — OCCUPATIONAL THERAPY INITIAL EVALUATION ADULT - GENERAL OBSERVATIONS, REHAB EVAL
Pt received seated in bedside chair, +dressing R residual limb, +ICU monitoring, +o2 via NC 2L, +IVL, +le

## 2024-05-23 NOTE — PROGRESS NOTE ADULT - SUBJECTIVE AND OBJECTIVE BOX
Subjective: Patient seen and examined. No new events except as noted.     SUBJECTIVE/ROS:  s/p AKA  No chest pain, dyspnea, palpitation, or dizziness.       MEDICATIONS:  MEDICATIONS  (STANDING):  albuterol/ipratropium for Nebulization 3 milliLiter(s) Nebulizer every 6 hours  aMIOdarone    Tablet 200 milliGRAM(s) Oral daily  amLODIPine   Tablet 5 milliGRAM(s) Oral daily  aspirin enteric coated 81 milliGRAM(s) Oral daily  atorvastatin 40 milliGRAM(s) Oral at bedtime  budesonide 160 MICROgram(s)/formoterol 4.5 MICROgram(s) Inhaler 2 Puff(s) Inhalation two times a day  ceFAZolin   IVPB 500 milliGRAM(s) IV Intermittent every 8 hours  chlorhexidine 2% Cloths 1 Application(s) Topical <User Schedule>  doxycycline monohydrate Capsule 100 milliGRAM(s) Oral every 12 hours  insulin lispro (ADMELOG) corrective regimen sliding scale   SubCutaneous every 6 hours  melatonin 3 milliGRAM(s) Oral at bedtime  methylPREDNISolone sodium succinate Injectable 20 milliGRAM(s) IV Push every 8 hours  metoprolol succinate ER 25 milliGRAM(s) Oral daily  pantoprazole    Tablet 40 milliGRAM(s) Oral before breakfast  polyethylene glycol 3350 17 Gram(s) Oral daily  senna 2 Tablet(s) Oral at bedtime  tiotropium 2.5 MICROgram(s) Inhaler 2 Puff(s) Inhalation daily  torsemide 20 milliGRAM(s) Oral daily      PHYSICAL EXAM:  T(C): 36.5 (05-23-24 @ 03:00), Max: 36.9 (05-22-24 @ 19:00)  HR: 63 (05-23-24 @ 08:00) (63 - 79)  BP: 157/74 (05-23-24 @ 08:00) (115/62 - 179/86)  RR: 25 (05-23-24 @ 08:00) (11 - 31)  SpO2: 96% (05-23-24 @ 08:00) (92% - 100%)  Wt(kg): --  I&O's Summary    22 May 2024 07:01  -  23 May 2024 07:00  --------------------------------------------------------  IN: 906 mL / OUT: 1190 mL / NET: -284 mL    23 May 2024 07:01  -  23 May 2024 08:57  --------------------------------------------------------  IN: 200 mL / OUT: 200 mL / NET: 0 mL            JVP: Normal  Neck: supple  Lung: clear   CV: S1 S2 , Murmur:  Abd: soft  Ext: No edema  neuro: Awake / alert  Psych: flat affect  Skin: normal``    LABS/DATA:    CARDIAC MARKERS:  CARDIAC MARKERS ( 22 May 2024 04:01 )  x     / x     / 49 U/L / x     / x                                    11.7   15.20 )-----------( 182      ( 23 May 2024 00:17 )             39.1     05-23    138  |  101  |  54<H>  ----------------------------<  186<H>  4.1   |  20<L>  |  1.93<H>    Ca    8.8      23 May 2024 00:16  Phos  4.4     05-23  Mg     2.7     05-23      proBNP:   Lipid Profile:   HgA1c:   TSH:     TELE:  EKG:

## 2024-05-23 NOTE — OCCUPATIONAL THERAPY INITIAL EVALUATION ADULT - LIVES WITH, PROFILE
Pt states she lives alone in private home, 3 steps to enter, +flight to bedroom. Pt will be staying with daughter in private home with 2 steps to enter, 1st floor setup, walk in shower. Pt I in ADLs and ambulation prior to admission

## 2024-05-23 NOTE — PROGRESS NOTE ADULT - ASSESSMENT
83F h/o CAD (1 stent placed), COPD, HTN, HLD, PAD s/p  right ilio-profunda and jump femoro-above knee popliteal bypass in summer 2018, brain aneurysm p/w 3d of RLE pain, numbness. States sxs started as b/l pain in soles of feet Monday night.    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    PLAN:   Neurologic:   - AOx4  - Pain: Dilaudid + Oxycodone + Tylenol    Respiratory:   - Duoneb  - Solumedrol IV  - Budesonide  - Tiotropoium 2.5MICROgm  - NC@2L    Cardiovascular:   - MAP goal > 65   - home amiodarone  - metoprolol 25 daily  - amlodipine 10 daily    Gastrointestinal/Nutrition:   - Regular diet  - Bowel regimen (Miralax, senna)  - Protonix 40 mg QD    Renal/Genitourinary:   - Monitor urinary output  - Trend electrolytes and replete  - torsemide 20 daily    Hematologic:   - Heparin gtt - HOLD pert primary team, plan to re-start today  - ASA 81mg    Infectious Disease:   - ABX: Doxycyline (5/22-), Ancef (5/23-)    Endocrine:   - Monitor glucose levels     Disposition: SICU

## 2024-05-23 NOTE — PROGRESS NOTE ADULT - SUBJECTIVE AND OBJECTIVE BOX
Interval events  - s/p R AKA  - Doxycycline started for COPD exacerbation  - plan for heparin gtt today per primary team    SUBJECTIVE/ROS:  [ ] A ten-point review of systems was otherwise negative except as noted.  [ ] Due to altered mental status/intubation, subjective information were not able to be obtained from the patient. History was obtained, to the extent possible, from review of the chart and collateral sources of information.      NEURO  Exam: awake, alert, oriented  Meds: acetaminophen     Tablet .. 975 milliGRAM(s) Oral every 6 hours PRN Mild Pain (1 - 3)  HYDROmorphone  Injectable 0.5 milliGRAM(s) IV Push every 4 hours PRN Severe Pain (7 - 10)  melatonin 3 milliGRAM(s) Oral at bedtime  oxyCODONE    IR 10 milliGRAM(s) Oral every 4 hours PRN Severe Pain (7 - 10)  oxyCODONE    IR 5 milliGRAM(s) Oral every 4 hours PRN Moderate Pain (4 - 6)  [x] Adequacy of sedation and pain control has been assessed and adjusted      RESPIRATORY  RR: 23 (05-23-24 @ 00:00) (14 - 31)  SpO2: 93% (05-23-24 @ 00:00) (91% - 100%)  Exam: unlabored, clear to auscultation bilaterally  Mechanical Ventilation:   ABG - ( 21 May 2024 09:40 )  pH: 7.37  /  pCO2: 48    /  pO2: 74    / HCO3: 28    / Base Excess: 1.7   /  SaO2: 94.8      [N/A] Extubation Readiness Assessed  Meds: albuterol/ipratropium for Nebulization 3 milliLiter(s) Nebulizer every 6 hours  budesonide 160 MICROgram(s)/formoterol 4.5 MICROgram(s) Inhaler 2 Puff(s) Inhalation two times a day  tiotropium 2.5 MICROgram(s) Inhaler 2 Puff(s) Inhalation daily      CARDIOVASCULAR  HR: 73 (05-23-24 @ 00:00) (48 - 79)  BP: 158/69 (05-23-24 @ 00:00) (115/62 - 179/86)  BP(mean): 99 (05-23-24 @ 00:00) (79 - 124)  VBG - ( 23 May 2024 00:07 )  pH: 7.35  /  pCO2: 43    /  pO2: 63    / HCO3: 24    / Base Excess: -2.0  /  SaO2: 91.1   Lactate: 2.2      Exam: regular rate and rhythm  Cardiac Rhythm: sinus  Perfusion     [x]Adequate   [ ]Inadequate  Mentation   [x]Normal       [ ]Reduced  Extremities  [x]Warm         [ ]Cool  Volume Status [ ]Hypervolemic [x]Euvolemic [ ]Hypovolemic  Meds: aMIOdarone    Tablet 200 milliGRAM(s) Oral daily  amLODIPine   Tablet 5 milliGRAM(s) Oral daily  metoprolol succinate ER 25 milliGRAM(s) Oral daily  torsemide 20 milliGRAM(s) Oral daily      GI/NUTRITION  Exam: soft, nontender, nondistended  Diet: regular  Meds: pantoprazole    Tablet 40 milliGRAM(s) Oral before breakfast  polyethylene glycol 3350 17 Gram(s) Oral daily  senna 2 Tablet(s) Oral at bedtime      GENITOURINARY  I&O's Detail    05-21 @ 07:01  -  05-22 @ 07:00  --------------------------------------------------------  IN:    Heparin: 143 mL    Heparin Infusion: 77 mL    IV PiggyBack: 150 mL    Lactated Ringers Bolus: 500 mL    Oral Fluid: 280 mL  Total IN: 1150 mL    OUT:    Voided (mL): 750 mL  Total OUT: 750 mL    Total NET: 400 mL      05-22 @ 07:01  -  05-23 @ 01:09  --------------------------------------------------------  IN:    Heparin: 66 mL    IV PiggyBack: 50 mL    Oral Fluid: 440 mL  Total IN: 556 mL    OUT:    Indwelling Catheter - Urethral (mL): 470 mL    Voided (mL): 350 mL  Total OUT: 820 mL    Total NET: -264 mL      05-23    138  |  101  |  54<H>  ----------------------------<  186<H>  4.1   |  20<L>  |  1.93<H>    Ca    8.8      23 May 2024 00:16  Phos  4.4     05-23  Mg     2.7     05-23    TPro  8.0  /  Alb  3.5  /  TBili  0.7  /  DBili  x   /  AST  15  /  ALT  8<L>  /  AlkPhos  83  05-21    [ ] Parisi catheter, indication: N/A  Meds: calcium gluconate IVPB 2 Gram(s) IV Intermittent once      HEMATOLOGIC  Meds: aspirin enteric coated 81 milliGRAM(s) Oral daily    [x] VTE Prophylaxis                        11.7   15.20 )-----------( 182      ( 23 May 2024 00:17 )             39.1     PT/INR - ( 23 May 2024 00:17 )   PT: 11.6 sec;   INR: 1.11 ratio         PTT - ( 23 May 2024 00:17 )  PTT:24.1 sec  Transfusion     [ ] PRBC   [ ] Platelets   [ ] FFP   [ ] Cryoprecipitate      INFECTIOUS DISEASES  WBC Count: 15.20 K/uL (05-23 @ 00:17)  WBC Count: 11.11 K/uL (05-22 @ 16:56)    RECENT CULTURES:  Specimen Source: .Blood Blood-Peripheral  Date/Time: 05-21 @ 11:01  Culture Results:   No growth at 24 hours  Gram Stain: --  Organism: --    Meds: ceFAZolin   IVPB 500 milliGRAM(s) IV Intermittent every 8 hours  doxycycline monohydrate Capsule 100 milliGRAM(s) Oral every 12 hours      ENDOCRINE  CAPILLARY BLOOD GLUCOSE  POCT Blood Glucose.: 201 mg/dL (22 May 2024 23:54)  POCT Blood Glucose.: 150 mg/dL (22 May 2024 11:06)    Meds: atorvastatin 40 milliGRAM(s) Oral at bedtime  insulin lispro (ADMELOG) corrective regimen sliding scale   SubCutaneous every 6 hours  methylPREDNISolone sodium succinate Injectable 20 milliGRAM(s) IV Push every 8 hours      OTHER MEDICATIONS:  chlorhexidine 2% Cloths 1 Application(s) Topical <User Schedule>      CODE STATUS: full code

## 2024-05-23 NOTE — OCCUPATIONAL THERAPY INITIAL EVALUATION ADULT - PERTINENT HX OF CURRENT PROBLEM, REHAB EVAL
83F h/o CAD (1 stent placed), COPD, HTN, HLD, PAD s/p  right ilio-profunda and jump femoro-above knee popliteal bypass in summer 2018, brain aneurysm p/w 3d of RLE pain, numbness. States sxs started as b/l pain in soles of feet Monday night. Pt s/p R AKA 5/22

## 2024-05-23 NOTE — PROGRESS NOTE ADULT - ASSESSMENT
PAD  s/p AKA  fu with vascular surgery     CAD  s/p PCI   recent cath jan 2024 with patent stent  on asa  on statin   on BB    PAF  on amio  avoid qt prolonging drugs  resume a/c once deemed safe from vascular surgery

## 2024-05-24 LAB
ANION GAP SERPL CALC-SCNC: 15 MMOL/L — SIGNIFICANT CHANGE UP (ref 5–17)
APTT BLD: 25.6 SEC — SIGNIFICANT CHANGE UP (ref 24.5–35.6)
BASE EXCESS BLDV CALC-SCNC: -1.9 MMOL/L — SIGNIFICANT CHANGE UP (ref -2–3)
BUN SERPL-MCNC: 63 MG/DL — HIGH (ref 7–23)
CA-I SERPL-SCNC: 0.86 MMOL/L — LOW (ref 1.15–1.33)
CALCIUM SERPL-MCNC: 8.6 MG/DL — SIGNIFICANT CHANGE UP (ref 8.4–10.5)
CHLORIDE BLDV-SCNC: 102 MMOL/L — SIGNIFICANT CHANGE UP (ref 96–108)
CHLORIDE SERPL-SCNC: 102 MMOL/L — SIGNIFICANT CHANGE UP (ref 96–108)
CO2 BLDV-SCNC: 25 MMOL/L — SIGNIFICANT CHANGE UP (ref 22–26)
CO2 SERPL-SCNC: 22 MMOL/L — SIGNIFICANT CHANGE UP (ref 22–31)
CREAT SERPL-MCNC: 1.78 MG/DL — HIGH (ref 0.5–1.3)
EGFR: 28 ML/MIN/1.73M2 — LOW
GAS PNL BLDV: 136 MMOL/L — SIGNIFICANT CHANGE UP (ref 136–145)
GAS PNL BLDV: SIGNIFICANT CHANGE UP
GLUCOSE BLDC GLUCOMTR-MCNC: 117 MG/DL — HIGH (ref 70–99)
GLUCOSE BLDC GLUCOMTR-MCNC: 153 MG/DL — HIGH (ref 70–99)
GLUCOSE BLDC GLUCOMTR-MCNC: 167 MG/DL — HIGH (ref 70–99)
GLUCOSE BLDC GLUCOMTR-MCNC: 194 MG/DL — HIGH (ref 70–99)
GLUCOSE BLDV-MCNC: 108 MG/DL — HIGH (ref 70–99)
GLUCOSE SERPL-MCNC: 113 MG/DL — HIGH (ref 70–99)
HCO3 BLDV-SCNC: 24 MMOL/L — SIGNIFICANT CHANGE UP (ref 22–29)
HCT VFR BLD CALC: 39.4 % — SIGNIFICANT CHANGE UP (ref 34.5–45)
HCT VFR BLDA CALC: 35 % — SIGNIFICANT CHANGE UP (ref 34.5–46.5)
HGB BLD CALC-MCNC: 11.8 G/DL — SIGNIFICANT CHANGE UP (ref 11.7–16.1)
HGB BLD-MCNC: 11.6 G/DL — SIGNIFICANT CHANGE UP (ref 11.5–15.5)
HOROWITZ INDEX BLDV+IHG-RTO: 28 — SIGNIFICANT CHANGE UP
INR BLD: 1.18 RATIO — SIGNIFICANT CHANGE UP (ref 0.85–1.18)
LACTATE BLDV-MCNC: 1.3 MMOL/L — SIGNIFICANT CHANGE UP (ref 0.5–2)
MAGNESIUM SERPL-MCNC: 2.4 MG/DL — SIGNIFICANT CHANGE UP (ref 1.6–2.6)
MCHC RBC-ENTMCNC: 23.5 PG — LOW (ref 27–34)
MCHC RBC-ENTMCNC: 29.4 GM/DL — LOW (ref 32–36)
MCV RBC AUTO: 79.8 FL — LOW (ref 80–100)
NRBC # BLD: 0 /100 WBCS — SIGNIFICANT CHANGE UP (ref 0–0)
PCO2 BLDV: 43 MMHG — HIGH (ref 39–42)
PH BLDV: 7.35 — SIGNIFICANT CHANGE UP (ref 7.32–7.43)
PHOSPHATE SERPL-MCNC: 2.7 MG/DL — SIGNIFICANT CHANGE UP (ref 2.5–4.5)
PLATELET # BLD AUTO: 151 K/UL — SIGNIFICANT CHANGE UP (ref 150–400)
PO2 BLDV: 59 MMHG — HIGH (ref 25–45)
POTASSIUM BLDV-SCNC: 4.3 MMOL/L — SIGNIFICANT CHANGE UP (ref 3.5–5.1)
POTASSIUM SERPL-MCNC: 4.4 MMOL/L — SIGNIFICANT CHANGE UP (ref 3.5–5.3)
POTASSIUM SERPL-SCNC: 4.4 MMOL/L — SIGNIFICANT CHANGE UP (ref 3.5–5.3)
PROTHROM AB SERPL-ACNC: 12.9 SEC — SIGNIFICANT CHANGE UP (ref 9.5–13)
RBC # BLD: 4.94 M/UL — SIGNIFICANT CHANGE UP (ref 3.8–5.2)
RBC # FLD: 21.2 % — HIGH (ref 10.3–14.5)
SAO2 % BLDV: 90.8 % — HIGH (ref 67–88)
SODIUM SERPL-SCNC: 139 MMOL/L — SIGNIFICANT CHANGE UP (ref 135–145)
WBC # BLD: 14.18 K/UL — HIGH (ref 3.8–10.5)
WBC # FLD AUTO: 14.18 K/UL — HIGH (ref 3.8–10.5)

## 2024-05-24 PROCEDURE — 99222 1ST HOSP IP/OBS MODERATE 55: CPT

## 2024-05-24 RX ORDER — GABAPENTIN 400 MG/1
200 CAPSULE ORAL THREE TIMES A DAY
Refills: 0 | Status: DISCONTINUED | OUTPATIENT
Start: 2024-05-24 | End: 2024-05-28

## 2024-05-24 RX ORDER — HYDROMORPHONE HYDROCHLORIDE 2 MG/ML
0.2 INJECTION INTRAMUSCULAR; INTRAVENOUS; SUBCUTANEOUS EVERY 4 HOURS
Refills: 0 | Status: DISCONTINUED | OUTPATIENT
Start: 2024-05-24 | End: 2024-05-26

## 2024-05-24 RX ORDER — HYDROMORPHONE HYDROCHLORIDE 2 MG/ML
2 INJECTION INTRAMUSCULAR; INTRAVENOUS; SUBCUTANEOUS EVERY 4 HOURS
Refills: 0 | Status: DISCONTINUED | OUTPATIENT
Start: 2024-05-24 | End: 2024-05-26

## 2024-05-24 RX ORDER — INSULIN LISPRO 100/ML
VIAL (ML) SUBCUTANEOUS AT BEDTIME
Refills: 0 | Status: DISCONTINUED | OUTPATIENT
Start: 2024-05-24 | End: 2024-05-28

## 2024-05-24 RX ORDER — CALCIUM GLUCONATE 100 MG/ML
2 VIAL (ML) INTRAVENOUS ONCE
Refills: 0 | Status: COMPLETED | OUTPATIENT
Start: 2024-05-24 | End: 2024-05-24

## 2024-05-24 RX ORDER — ANAGRELIDE HCL 0.5 MG
1 CAPSULE ORAL
Refills: 0 | Status: DISCONTINUED | OUTPATIENT
Start: 2024-05-24 | End: 2024-05-28

## 2024-05-24 RX ORDER — APIXABAN 2.5 MG/1
2.5 TABLET, FILM COATED ORAL EVERY 12 HOURS
Refills: 0 | Status: DISCONTINUED | OUTPATIENT
Start: 2024-05-24 | End: 2024-05-28

## 2024-05-24 RX ORDER — ACETAMINOPHEN 500 MG
975 TABLET ORAL EVERY 6 HOURS
Refills: 0 | Status: DISCONTINUED | OUTPATIENT
Start: 2024-05-24 | End: 2024-05-28

## 2024-05-24 RX ORDER — INSULIN LISPRO 100/ML
VIAL (ML) SUBCUTANEOUS
Refills: 0 | Status: DISCONTINUED | OUTPATIENT
Start: 2024-05-24 | End: 2024-05-28

## 2024-05-24 RX ORDER — HYDROMORPHONE HYDROCHLORIDE 2 MG/ML
0.2 INJECTION INTRAMUSCULAR; INTRAVENOUS; SUBCUTANEOUS EVERY 4 HOURS
Refills: 0 | Status: DISCONTINUED | OUTPATIENT
Start: 2024-05-24 | End: 2024-05-24

## 2024-05-24 RX ADMIN — AMIODARONE HYDROCHLORIDE 200 MILLIGRAM(S): 400 TABLET ORAL at 06:24

## 2024-05-24 RX ADMIN — Medication 50 MILLIGRAM(S): at 06:24

## 2024-05-24 RX ADMIN — APIXABAN 2.5 MILLIGRAM(S): 2.5 TABLET, FILM COATED ORAL at 17:39

## 2024-05-24 RX ADMIN — GABAPENTIN 100 MILLIGRAM(S): 400 CAPSULE ORAL at 13:24

## 2024-05-24 RX ADMIN — Medication 2: at 12:07

## 2024-05-24 RX ADMIN — Medication 200 GRAM(S): at 03:26

## 2024-05-24 RX ADMIN — OXYCODONE HYDROCHLORIDE 5 MILLIGRAM(S): 5 TABLET ORAL at 21:52

## 2024-05-24 RX ADMIN — GABAPENTIN 200 MILLIGRAM(S): 400 CAPSULE ORAL at 22:29

## 2024-05-24 RX ADMIN — Medication 975 MILLIGRAM(S): at 18:15

## 2024-05-24 RX ADMIN — Medication 20 MILLIGRAM(S): at 06:24

## 2024-05-24 RX ADMIN — Medication 100 MILLIGRAM(S): at 17:39

## 2024-05-24 RX ADMIN — Medication 100 MILLIGRAM(S): at 06:24

## 2024-05-24 RX ADMIN — OXYCODONE HYDROCHLORIDE 5 MILLIGRAM(S): 5 TABLET ORAL at 20:52

## 2024-05-24 RX ADMIN — BUDESONIDE AND FORMOTEROL FUMARATE DIHYDRATE 2 PUFF(S): 160; 4.5 AEROSOL RESPIRATORY (INHALATION) at 06:26

## 2024-05-24 RX ADMIN — OXYCODONE HYDROCHLORIDE 5 MILLIGRAM(S): 5 TABLET ORAL at 05:56

## 2024-05-24 RX ADMIN — Medication 81 MILLIGRAM(S): at 12:02

## 2024-05-24 RX ADMIN — Medication 3 MILLILITER(S): at 06:37

## 2024-05-24 RX ADMIN — Medication 1 MILLIGRAM(S): at 20:52

## 2024-05-24 RX ADMIN — BUDESONIDE AND FORMOTEROL FUMARATE DIHYDRATE 2 PUFF(S): 160; 4.5 AEROSOL RESPIRATORY (INHALATION) at 17:39

## 2024-05-24 RX ADMIN — POLYETHYLENE GLYCOL 3350 17 GRAM(S): 17 POWDER, FOR SOLUTION ORAL at 12:01

## 2024-05-24 RX ADMIN — Medication 3 MILLILITER(S): at 12:00

## 2024-05-24 RX ADMIN — AMLODIPINE BESYLATE 5 MILLIGRAM(S): 2.5 TABLET ORAL at 06:24

## 2024-05-24 RX ADMIN — OXYCODONE HYDROCHLORIDE 5 MILLIGRAM(S): 5 TABLET ORAL at 04:56

## 2024-05-24 RX ADMIN — Medication 3 MILLIGRAM(S): at 22:29

## 2024-05-24 RX ADMIN — Medication 2: at 17:40

## 2024-05-24 RX ADMIN — Medication 975 MILLIGRAM(S): at 12:09

## 2024-05-24 RX ADMIN — Medication 255 MILLIMOLE(S): at 03:26

## 2024-05-24 RX ADMIN — Medication 3 MILLILITER(S): at 17:39

## 2024-05-24 RX ADMIN — CHLORHEXIDINE GLUCONATE 1 APPLICATION(S): 213 SOLUTION TOPICAL at 07:06

## 2024-05-24 RX ADMIN — TIOTROPIUM BROMIDE 2 PUFF(S): 18 CAPSULE ORAL; RESPIRATORY (INHALATION) at 12:00

## 2024-05-24 RX ADMIN — OXYCODONE HYDROCHLORIDE 5 MILLIGRAM(S): 5 TABLET ORAL at 00:26

## 2024-05-24 RX ADMIN — Medication 975 MILLIGRAM(S): at 06:25

## 2024-05-24 RX ADMIN — GABAPENTIN 100 MILLIGRAM(S): 400 CAPSULE ORAL at 06:23

## 2024-05-24 RX ADMIN — Medication 975 MILLIGRAM(S): at 13:00

## 2024-05-24 RX ADMIN — PANTOPRAZOLE SODIUM 40 MILLIGRAM(S): 20 TABLET, DELAYED RELEASE ORAL at 06:24

## 2024-05-24 RX ADMIN — ATORVASTATIN CALCIUM 40 MILLIGRAM(S): 80 TABLET, FILM COATED ORAL at 22:29

## 2024-05-24 RX ADMIN — Medication 975 MILLIGRAM(S): at 17:38

## 2024-05-24 NOTE — PROVIDER CONTACT NOTE (OTHER) - ASSESSMENT
Pts dressing was slightly saturated with blood upon assessment. No signs of active bleeding. Saturation was circular on the outer portion of acewrap.

## 2024-05-24 NOTE — CONSULT NOTE ADULT - SUBJECTIVE AND OBJECTIVE BOX
Patient is a 83y old  Female who presents with a chief complaint of "83F h/o CAD (1 stent placed), COPD, HTN, HLD, PAD s/p  right ilio-profunda and jump femoro-above knee popliteal bypass in summer 2018, brain aneurysm p/w 3d of RLE pain, numbness. States sxs started as b/l pain in soles of feet Monday night. Went to [facility] who dc'd with dx of plantar fasciitis. Today, R foot became numb and more painful. Also noted purple discoloration. Endorses rest pain, no wounds."     (22 May 2024 10:19)  Admission HPI:  HPI: 83F h/o CAD (1 stent placed), COPD, HTN, HLD, PAD s/p  right ilio-profunda and jump femoro-above knee popliteal bypass in summer 2018, brain aneurysm p/w 3d of RLE pain, numbness. States sxs started as b/l pain in soles of feet Monday night. Went to [facility] who dc'd with dx of plantar fasciitis. Today, R foot became numb and more painful. Also noted purple discoloration. Endorses rest pain, no wounds.     Interval History:  Patient s/p R AKA on 5/22.    REVIEW OF SYSTEMS: No chest pain, shortness of breath, nausea, vomiting or diarhea; other ROS neg     PAST MEDICAL & SURGICAL HISTORY  CAD (coronary artery disease)  History of COPD  PVD (peripheral vascular disease)    FUNCTIONAL HISTORY:   Lives alone but will stay w dtr.  PTA Independent    CURRENT FUNCTIONAL STATUS:  Mod A transfers    FAMILY HISTORY   N/C    MEDICATIONS   acetaminophen     Tablet .. 975 milliGRAM(s) Oral every 6 hours  albuterol/ipratropium for Nebulization 3 milliLiter(s) Nebulizer every 6 hours  aMIOdarone    Tablet 200 milliGRAM(s) Oral daily  amLODIPine   Tablet 5 milliGRAM(s) Oral daily  anagrelide 1 milliGRAM(s) Oral <User Schedule>  apixaban 2.5 milliGRAM(s) Oral every 12 hours  atorvastatin 40 milliGRAM(s) Oral at bedtime  budesonide 160 MICROgram(s)/formoterol 4.5 MICROgram(s) Inhaler 2 Puff(s) Inhalation two times a day  chlorhexidine 2% Cloths 1 Application(s) Topical <User Schedule>  doxycycline monohydrate Capsule 100 milliGRAM(s) Oral every 12 hours  gabapentin 100 milliGRAM(s) Oral three times a day  HYDROmorphone   Tablet 2 milliGRAM(s) Oral every 4 hours PRN  insulin lispro (ADMELOG) corrective regimen sliding scale   SubCutaneous three times a day before meals  insulin lispro (ADMELOG) corrective regimen sliding scale   SubCutaneous at bedtime  melatonin 3 milliGRAM(s) Oral at bedtime  metoprolol succinate ER 25 milliGRAM(s) Oral daily  oxyCODONE    IR 5 milliGRAM(s) Oral every 4 hours PRN  pantoprazole    Tablet 40 milliGRAM(s) Oral before breakfast  polyethylene glycol 3350 17 Gram(s) Oral every 12 hours  predniSONE   Tablet 50 milliGRAM(s) Oral daily  senna 2 Tablet(s) Oral at bedtime  tiotropium 2.5 MICROgram(s) Inhaler 2 Puff(s) Inhalation daily  torsemide 20 milliGRAM(s) Oral daily    ALLERGIES  No Known Allergies    VITALS  T(C): 36.5 (05-24-24 @ 08:38), Max: 36.8 (05-23-24 @ 23:00)  HR: 57 (05-24-24 @ 08:38) (53 - 65)  BP: 161/79 (05-24-24 @ 08:38) (131/82 - 165/80)  RR: 19 (05-24-24 @ 08:38) (13 - 28)  SpO2: 93% (05-24-24 @ 08:38) (89% - 96%)  Wt(kg): --    PHYSICAL EXAM  Constitutional - NAD, Comfortable  HEENT - NCAT, EOMI  Neck - Supple  Chest - No distress, no use of accessory muscles for respiration  Cardiovascular -[place], Well perfused  Abdomen - BS+, Soft, NTND  Extremities - No C/C/E, No calf tenderness   Neurologic Exam -                    Cognitive - Awake, Alert, AAO to self, place, date, year, situation     Communication - Fluent, No dysarthria, no aphasia     Cranial Nerves - CN 2-12 intact     Motor - No focal deficits      Sensory - Intact to LT     Reflexes - DTR Intact, No primitive reflexive  Psychiatric - Mood stable, Affect WNL    RECENT LABS/IMAGING  CBC Full  -  ( 24 May 2024 01:09 )  WBC Count : 14.18 K/uL  RBC Count : 4.94 M/uL  Hemoglobin : 11.6 g/dL  Hematocrit : 39.4 %  Platelet Count - Automated : 151 K/uL  Mean Cell Volume : 79.8 fl  Mean Cell Hemoglobin : 23.5 pg  Mean Cell Hemoglobin Concentration : 29.4 gm/dL  Auto Neutrophil # : x  Auto Lymphocyte # : x  Auto Monocyte # : x  Auto Eosinophil # : x  Auto Basophil # : x  Auto Neutrophil % : x  Auto Lymphocyte % : x  Auto Monocyte % : x  Auto Eosinophil % : x  Auto Basophil % : x    05-24    139  |  102  |  63<H>  ----------------------------<  113<H>  4.4   |  22  |  1.78<H>    Ca    8.6      24 May 2024 01:09  Phos  2.7     05-24  Mg     2.4     05-24      Urinalysis Basic - ( 24 May 2024 01:09 )    Color: x / Appearance: x / SG: x / pH: x  Gluc: 113 mg/dL / Ketone: x  / Bili: x / Urobili: x   Blood: x / Protein: x / Nitrite: x   Leuk Esterase: x / RBC: x / WBC x   Sq Epi: x / Non Sq Epi: x / Bacteria: x    Impression:  84 yo with functional deficits secondary to diagnosis of PVD now s/p R AKA    Plan:  PT- ROM, Bed Mob, Transfers, Amb w AD and bracing as needed  OT- ADLs, bracing  Prec- Falls, Cardiac  DVT Prophylaxis - On Apixaban  Monitor renal function.  Skin- Turn q2 h  Dispo-  Patient is a 83y old  Female who presents with a chief complaint of "83F h/o CAD (1 stent placed), COPD, HTN, HLD, PAD s/p  right ilio-profunda and jump femoro-above knee popliteal bypass in summer 2018, brain aneurysm p/w 3d of RLE pain, numbness. States sxs started as b/l pain in soles of feet Monday night. Went to [facility] who dc'd with dx of plantar fasciitis. Today, R foot became numb and more painful. Also noted purple discoloration. Endorses rest pain, no wounds."     (22 May 2024 10:19)  Admission HPI:  HPI: 83F h/o CAD (1 stent placed), COPD, HTN, HLD, PAD s/p  right ilio-profunda and jump femoro-above knee popliteal bypass in summer 2018, brain aneurysm p/w 3d of RLE pain, numbness. States sxs started as b/l pain in soles of feet Monday night. Went to [facility] who dc'd with dx of plantar fasciitis. Today, R foot became numb and more painful. Also noted purple discoloration. Endorses rest pain, no wounds.     Interval History:  Patient s/p R AKA on 5/22.  Patient's daughter at bedside.    REVIEW OF SYSTEMS: + phantom pain/sensation, No chest pain, shortness of breath, nausea, vomiting or diarhea; other ROS neg     PAST MEDICAL & SURGICAL HISTORY  CAD (coronary artery disease)  History of COPD  PVD (peripheral vascular disease)    FUNCTIONAL HISTORY:   Lives alone but will stay w dtr.  PTA Independent    CURRENT FUNCTIONAL STATUS:  Mod A transfers    FAMILY HISTORY   N/C    MEDICATIONS   acetaminophen     Tablet .. 975 milliGRAM(s) Oral every 6 hours  albuterol/ipratropium for Nebulization 3 milliLiter(s) Nebulizer every 6 hours  aMIOdarone    Tablet 200 milliGRAM(s) Oral daily  amLODIPine   Tablet 5 milliGRAM(s) Oral daily  anagrelide 1 milliGRAM(s) Oral <User Schedule>  apixaban 2.5 milliGRAM(s) Oral every 12 hours  atorvastatin 40 milliGRAM(s) Oral at bedtime  budesonide 160 MICROgram(s)/formoterol 4.5 MICROgram(s) Inhaler 2 Puff(s) Inhalation two times a day  chlorhexidine 2% Cloths 1 Application(s) Topical <User Schedule>  doxycycline monohydrate Capsule 100 milliGRAM(s) Oral every 12 hours  gabapentin 100 milliGRAM(s) Oral three times a day  HYDROmorphone   Tablet 2 milliGRAM(s) Oral every 4 hours PRN  insulin lispro (ADMELOG) corrective regimen sliding scale   SubCutaneous three times a day before meals  insulin lispro (ADMELOG) corrective regimen sliding scale   SubCutaneous at bedtime  melatonin 3 milliGRAM(s) Oral at bedtime  metoprolol succinate ER 25 milliGRAM(s) Oral daily  oxyCODONE    IR 5 milliGRAM(s) Oral every 4 hours PRN  pantoprazole    Tablet 40 milliGRAM(s) Oral before breakfast  polyethylene glycol 3350 17 Gram(s) Oral every 12 hours  predniSONE   Tablet 50 milliGRAM(s) Oral daily  senna 2 Tablet(s) Oral at bedtime  tiotropium 2.5 MICROgram(s) Inhaler 2 Puff(s) Inhalation daily  torsemide 20 milliGRAM(s) Oral daily    ALLERGIES  No Known Allergies    VITALS  T(C): 36.5 (05-24-24 @ 08:38), Max: 36.8 (05-23-24 @ 23:00)  HR: 57 (05-24-24 @ 08:38) (53 - 65)  BP: 161/79 (05-24-24 @ 08:38) (131/82 - 165/80)  RR: 19 (05-24-24 @ 08:38) (13 - 28)  SpO2: 93% (05-24-24 @ 08:38) (89% - 96%)  Wt(kg): --    PHYSICAL EXAM  Constitutional - NAD, Comfortable  HEENT - NCAT, EOMI  Neck - Supple  Chest - No distress, no use of accessory muscles for respiration  Cardiovascular -[place], Well perfused  Abdomen - BS+, Soft, NTND  Extremities - RLE w dressing intact,  No calf tenderness   Neurologic Exam -                 AAO x 3  Motor non-focal  Sensation intact   Psychiatric - Mood stable, Affect WNL    RECENT LABS/IMAGING  CBC Full  -  ( 24 May 2024 01:09 )  WBC Count : 14.18 K/uL  RBC Count : 4.94 M/uL  Hemoglobin : 11.6 g/dL  Hematocrit : 39.4 %  Platelet Count - Automated : 151 K/uL  Mean Cell Volume : 79.8 fl  Mean Cell Hemoglobin : 23.5 pg  Mean Cell Hemoglobin Concentration : 29.4 gm/dL  Auto Neutrophil # : x  Auto Lymphocyte # : x  Auto Monocyte # : x  Auto Eosinophil # : x  Auto Basophil # : x  Auto Neutrophil % : x  Auto Lymphocyte % : x  Auto Monocyte % : x  Auto Eosinophil % : x  Auto Basophil % : x    05-24    139  |  102  |  63<H>  ----------------------------<  113<H>  4.4   |  22  |  1.78<H>    Ca    8.6      24 May 2024 01:09  Phos  2.7     05-24  Mg     2.4     05-24      Urinalysis Basic - ( 24 May 2024 01:09 )    Color: x / Appearance: x / SG: x / pH: x  Gluc: 113 mg/dL / Ketone: x  / Bili: x / Urobili: x   Blood: x / Protein: x / Nitrite: x   Leuk Esterase: x / RBC: x / WBC x   Sq Epi: x / Non Sq Epi: x / Bacteria: x    Impression:  82 yo with functional deficits secondary to diagnosis of PVD now s/p R AKA    Plan:  PT- ROM, Bed Mob, Transfers, Amb w AD and bracing as needed  OT- ADLs, bracing  Prec- Falls, Cardiac  DVT Prophylaxis - On Apixaban  Monitor renal function.  Skin- Turn q2 h  Dispo- Acute Rehab- patient requires active and ongoing therapeutic interventions of multiple disciplines and can tolerate 3 hours of therapies x 2-4wks depending on progress at rehabilitation facility. Can actively participate and benefit from  an intensive rehabilitation program. Requires supervision by a rehabilitation physician and a coordinated interdisciplinary approach to providing rehabilitation.   d/w patient and her daughter rehab options and rehab plan post AKA.

## 2024-05-24 NOTE — PROGRESS NOTE ADULT - ASSESSMENT
PAD  s/p AKA  fu with vascular surgery   pain control    CAD  s/p PCI   recent cath jan 2024 with patent stent  on asa  on statin   on BB    PAF  on amio  avoid qt prolonging drugs  resume a/c once deemed safe from vascular surgery

## 2024-05-24 NOTE — PROGRESS NOTE ADULT - ASSESSMENT
83F h/o CAD (1 stent placed), COPD, HTN, HLD, PAF on amio and a/c , PAD s/p  right ilio-profunda and jump femoro-above knee popliteal bypass in summer 2018, brain aneurysm p/w 3d of RLE pain, numbness. States sxs started as b/l pain in soles of feet Monday night. Went to [facility] who dc'd with dx of plantar fasciitis. Today, R foot became numb and more painful. Also noted purple discoloration. Endorses rest pain, no wounds.       # PAD  - Pt with PAD to her RLE  - Planned for R AKA --> Unable to undergo surgery 5/20 due to concern with anesthesia and pulmonary status   - Pain control   - On Heparin gtt; Monitor Ptt and adjust as per normogram, monitor H/H  - Patient is deemed to be an elevated risk candidate for surgical intervention.   - Per Vascular surgery     # Fever  - Pt febrile to 100.7 rectally  - F/u BCx2 --> In lab  - Suggest to start patient on empiric ABX --> on Cefepime    - F/u RSV/Flu/Covid    - Trend CBC, Temp curve, VS and patient     # Acute Hypoxic Respiratory failure  - Per pulm, patient on 4L NC saturating 60%, placed on NRB on 5/20, transferred to SICU   - Pt already on AC with heparin gtt   - S/P Solumedrol 125 IV X 1, now on  20 Q8 per Pulm   - F/u TTE   - Monitor on continues pulse ox    # CAD   - On Heparin gtt; Monitor Ptt and adjust as per normogram, monitor H/H  - On Plavix and Statin therapies  - BP control  - TTE as noted with Grade II DD   - Monitor on tele  - Cardio follow up     # TOBIAS  - Likely SCHUYLER  - Monitor urine output   - Check bladder scan  - Avoid nephrotoxic medication  - PO/Oral hydration   - Cr Up-trending, continue to monitor and trend. Suggest gentle IVF for hydration. PO intake postoperatively     # A fib  - On Amiodarone  - Rate control - monitor on tele; monitor electrolytes    - On AC with heparin gtt  - Cardio follow up     # COPD  - On Bronchodilators  - On O2 supplementation; Maintain O2 > 88%    - Pulm eval appreciated; F/u recs    Abnormal CT Chest  - Pt with reported PET/CT outpatient due to enlarged lymphadenopathy  - CT C here with severe upper lobe centrilobar emphysema, enlarged Mediastinal LN, subcarinal node, 1cm supraclavicular LN, Splenomegaly   - Consider in versus outpatient biopsy to rule out lymphoma.  Follow up closely with Pulmonology      PPX

## 2024-05-24 NOTE — PROGRESS NOTE ADULT - SUBJECTIVE AND OBJECTIVE BOX
SICU Daily Progress Note  =====================================================    Interval events  - gabpentin  - on RA  - Suppository  - TOV  - restart home eliquis 2.5 BID      Allergies: No Known Allergies      MEDICATIONS:   --------------------------------------------------------------------------------------  Neurologic Medications  acetaminophen     Tablet .. 975 milliGRAM(s) Oral every 6 hours PRN Mild Pain (1 - 3)  gabapentin 100 milliGRAM(s) Oral three times a day  HYDROmorphone  Injectable 0.5 milliGRAM(s) IV Push every 4 hours PRN Severe Pain (7 - 10)  melatonin 3 milliGRAM(s) Oral at bedtime  oxyCODONE    IR 10 milliGRAM(s) Oral every 4 hours PRN Severe Pain (7 - 10)  oxyCODONE    IR 5 milliGRAM(s) Oral every 4 hours PRN Moderate Pain (4 - 6)    Respiratory Medications  albuterol/ipratropium for Nebulization 3 milliLiter(s) Nebulizer every 6 hours  budesonide 160 MICROgram(s)/formoterol 4.5 MICROgram(s) Inhaler 2 Puff(s) Inhalation two times a day  tiotropium 2.5 MICROgram(s) Inhaler 2 Puff(s) Inhalation daily    Cardiovascular Medications  aMIOdarone    Tablet 200 milliGRAM(s) Oral daily  amLODIPine   Tablet 5 milliGRAM(s) Oral daily  metoprolol succinate ER 25 milliGRAM(s) Oral daily  torsemide 20 milliGRAM(s) Oral daily    Gastrointestinal Medications  pantoprazole    Tablet 40 milliGRAM(s) Oral before breakfast  polyethylene glycol 3350 17 Gram(s) Oral every 12 hours  senna 2 Tablet(s) Oral at bedtime    Genitourinary Medications    Hematologic/Oncologic Medications  aspirin enteric coated 81 milliGRAM(s) Oral daily    Antimicrobial/Immunologic Medications  doxycycline monohydrate Capsule 100 milliGRAM(s) Oral every 12 hours    Endocrine/Metabolic Medications  atorvastatin 40 milliGRAM(s) Oral at bedtime  insulin lispro (ADMELOG) corrective regimen sliding scale   SubCutaneous every 6 hours  predniSONE   Tablet 50 milliGRAM(s) Oral daily    Topical/Other Medications  chlorhexidine 2% Cloths 1 Application(s) Topical <User Schedule>    --------------------------------------------------------------------------------------    VITAL SIGNS, INS/OUTS (last 24 hours):  --------------------------------------------------------------------------------------  T(C): 36.8 (05-23-24 @ 23:00), Max: 36.9 (05-23-24 @ 07:00)  HR: 65 (05-23-24 @ 23:35) (60 - 73)  BP: 139/62 (05-23-24 @ 23:00) (128/72 - 172/78)  BP(mean): 89 (05-23-24 @ 23:00) (89 - 112)  ABP: --  ABP(mean): --  RR: 24 (05-23-24 @ 23:00) (11 - 31)  SpO2: 94% (05-23-24 @ 23:35) (88% - 96%)  CVP(mm Hg): --  CI: --  CAPILLARY BLOOD GLUCOSE      POCT Blood Glucose.: 236 mg/dL (23 May 2024 23:25)  POCT Blood Glucose.: 157 mg/dL (23 May 2024 17:04)  POCT Blood Glucose.: 151 mg/dL (23 May 2024 11:37)  POCT Blood Glucose.: 166 mg/dL (23 May 2024 05:56)   N/A    05-22 @ 07:01 - 05-23 @ 07:00  --------------------------------------------------------  IN:    Heparin: 66 mL    IV PiggyBack: 200 mL    Oral Fluid: 640 mL  Total IN: 906 mL    OUT:    Indwelling Catheter - Urethral (mL): 840 mL    Voided (mL): 350 mL  Total OUT: 1190 mL    Total NET: -284 mL      05-23 @ 07:01  -  05-24 @ 00:30  --------------------------------------------------------  IN:    IV PiggyBack: 50 mL    Oral Fluid: 900 mL  Total IN: 950 mL    OUT:    Indwelling Catheter - Urethral (mL): 1060 mL  Total OUT: 1060 mL    Total NET: -110 mL        --------------------------------------------------------------------------------------    EXAM  NEUROLOGY: Normal, NAD, alert, oriented x3, no focal deficits.  HEENT: Normocephalic, atraumatic, EOMI.   RESPIRATORY: Lungs clear to auscultation, Normal expansion/effort.   -- Mechanical Ventilation:   CARDIOVASCULAR: S1, S2.  Regular rate and rhythm.   GI/NUTRITION: Abdomen soft, Non-tender, Non-distended.   VASCULAR: Extremities warm, pink, well-perfused.  MSK: All extremities moving spontaneously without limitations.  SKIN: Good skin turgor, no skin breakdown.   LINES:      LABS  --------------------------------------------------------------------------------------  CBC (05-23 @ 00:17)                              11.7                           15.20<H>  )----------------(  182        --    % Neutrophils, --    % Lymphocytes, ANC: --                                  39.1                CBC (05-22 @ 16:56)                              12.1                           11.11<H>  )----------------(  172        --    % Neutrophils, --    % Lymphocytes, ANC: --                                  40.7                  BMP (05-23 @ 00:16)             138     |  101     |  54<H> 		Ca++ --      Ca 8.8                ---------------------------------( 186<H>		Mg 2.7<H>             4.1     |  20<L>   |  1.93<H>			Ph 4.4     BMP (05-22 @ 16:56)             137     |  100     |  46<H> 		Ca++ --      Ca 8.7                ---------------------------------( 157<H>		Mg 2.6                4.1     |  22      |  1.82<H>			Ph 4.9<H>      Coags (05-23 @ 00:17)  aPTT 24.1<L> / INR 1.11 / PT 11.6  Coags (05-22 @ 16:56)  aPTT 25.4 / INR 1.07 / PT 11.2    ABG (05-23 @ 00:07)      /  /  /  /  / %     Lactate:   2.2<H>  ABG (05-22 @ 16:48)      /  /  /  /  / %     Lactate:   2.1<H>    VBG (05-23 @ 00:07)     7.35 / 43<H> / 63<H> / 24 / -2.0 / 91.1<H>%  VBG (05-22 @ 16:48)     7.30<L> / 52<H> / 31 / 26 / -1.4 / 48.2<L>%    -> .Blood Blood-Peripheral Culture (05-21 @ 11:01)     NG    NG    No growth at 48 Hours      --------------------------------------------------------------------------------------    IMAGING STUDIES:

## 2024-05-24 NOTE — PROGRESS NOTE ADULT - SUBJECTIVE AND OBJECTIVE BOX
Name of Patient : ANGIE LOPES  MRN: [id number]      Subjective: Patient seen and examined. No new events except as noted.     REVIEW OF SYSTEMS:    CONSTITUTIONAL: No weakness, fevers or chills  EYES/ENT: No visual changes;  No vertigo or throat pain   NECK: No pain or stiffness  RESPIRATORY: No cough, wheezing, hemoptysis; No shortness of breath  CARDIOVASCULAR: No chest pain or palpitations  GASTROINTESTINAL: No abdominal or epigastric pain. No nausea, vomiting, or hematemesis; No diarrhea or constipation. No melena or hematochezia.  GENITOURINARY: No dysuria, frequency or hematuria  NEUROLOGICAL: No numbness or weakness  SKIN: No itching, burning, rashes, or lesions   All other review of systems is negative unless indicated above.    MEDICATIONS:  MEDICATIONS  (STANDING):  acetaminophen     Tablet .. 975 milliGRAM(s) Oral every 6 hours  albuterol/ipratropium for Nebulization 3 milliLiter(s) Nebulizer every 6 hours  aMIOdarone    Tablet 200 milliGRAM(s) Oral daily  amLODIPine   Tablet 5 milliGRAM(s) Oral daily  anagrelide 1 milliGRAM(s) Oral <User Schedule>  apixaban 2.5 milliGRAM(s) Oral every 12 hours  atorvastatin 40 milliGRAM(s) Oral at bedtime  budesonide 160 MICROgram(s)/formoterol 4.5 MICROgram(s) Inhaler 2 Puff(s) Inhalation two times a day  chlorhexidine 2% Cloths 1 Application(s) Topical <User Schedule>  doxycycline monohydrate Capsule 100 milliGRAM(s) Oral every 12 hours  gabapentin 200 milliGRAM(s) Oral three times a day  insulin lispro (ADMELOG) corrective regimen sliding scale   SubCutaneous three times a day before meals  insulin lispro (ADMELOG) corrective regimen sliding scale   SubCutaneous at bedtime  melatonin 3 milliGRAM(s) Oral at bedtime  metoprolol succinate ER 25 milliGRAM(s) Oral daily  pantoprazole    Tablet 40 milliGRAM(s) Oral before breakfast  polyethylene glycol 3350 17 Gram(s) Oral every 12 hours  predniSONE   Tablet 50 milliGRAM(s) Oral daily  senna 2 Tablet(s) Oral at bedtime  tiotropium 2.5 MICROgram(s) Inhaler 2 Puff(s) Inhalation daily  torsemide 20 milliGRAM(s) Oral daily      PHYSICAL EXAM:  T(C): 37.1 (05-24-24 @ 16:35), Max: 37.1 (05-24-24 @ 16:35)  HR: 56 (05-24-24 @ 16:35) (53 - 62)  BP: 152/73 (05-24-24 @ 16:35) (131/82 - 165/80)  RR: 18 (05-24-24 @ 16:35) (13 - 24)  SpO2: 95% (05-24-24 @ 16:35) (90% - 95%)  Wt(kg): --  I&O's Summary    23 May 2024 07:01  -  24 May 2024 07:00  --------------------------------------------------------  IN: 1500 mL / OUT: 1270 mL / NET: 230 mL    24 May 2024 07:01  -  25 May 2024 00:05  --------------------------------------------------------  IN: 0 mL / OUT: 1200 mL / NET: -1200 mL          Appearance: Normal	  HEENT:  PERRLA   Lymphatic: No lymphadenopathy   Cardiovascular: Normal S1 S2, no JVD  Respiratory: normal effort , clear  Gastrointestinal:  Soft, Non-tender  Skin: No rashes,  warm to touch  Psychiatry:  Mood & affect appropriate  Musculuskeletal: No edema    recent labs, Imaging and EKGs personally reviewed             05-23-24 @ 07:01  -  05-24-24 @ 07:00  --------------------------------------------------------  IN: 1500 mL / OUT: 1270 mL / NET: 230 mL    05-24-24 @ 07:01  -  05-25-24 @ 00:05  --------------------------------------------------------  IN: 0 mL / OUT: 1200 mL / NET: -1200 mL

## 2024-05-24 NOTE — PROGRESS NOTE ADULT - SUBJECTIVE AND OBJECTIVE BOX
VASCULAR SURGERY DAILY PROGRESS NOTE    24 Hour/Overnight Events: Pt downgraded to surgical floor overnight    SUBJECTIVE: Patient seen and evaluated on AM rounds. Pt reports Right AKA pain that is not currently controlled on current pain regimen. Patient has only received Oxycodone 5mg for pain; pt has been declining Oxycodone 10mg because it makes her "loopy." Tolerating diet. Denies fevers/chills, chest pain, dyspnea, abdominal pain, nausea, vomiting, and diarrhea    ------------------------------------------------------------------------------------------------------------  OBJECTIVE:  Vital Signs Last 24 Hrs  T(C): 36.5 (24 May 2024 08:38), Max: 36.8 (23 May 2024 23:00)  T(F): 97.7 (24 May 2024 08:38), Max: 98.2 (23 May 2024 23:00)  HR: 57 (24 May 2024 08:38) (53 - 65)  BP: 161/79 (24 May 2024 08:38) (128/72 - 165/80)  BP(mean): 98 (24 May 2024 04:00) (89 - 104)  RR: 19 (24 May 2024 08:38) (13 - 30)  SpO2: 93% (24 May 2024 08:38) (88% - 96%)    Parameters below as of 24 May 2024 08:38  Patient On (Oxygen Delivery Method): room air      I&O's Detail    23 May 2024 07:01  -  24 May 2024 07:00  --------------------------------------------------------  IN:    IV PiggyBack: 150 mL    IV PiggyBack: 250 mL    Oral Fluid: 1100 mL  Total IN: 1500 mL    OUT:    Indwelling Catheter - Urethral (mL): 1270 mL  Total OUT: 1270 mL    Total NET: 230 mL      LABS:                        11.6   14.18 )-----------( 151      ( 24 May 2024 01:09 )             39.4     05-24    139  |  102  |  63<H>  ----------------------------<  113<H>  4.4   |  22  |  1.78<H>    Ca    8.6      24 May 2024 01:09  Phos  2.7     05-24  Mg     2.4     05-24    PT/INR - ( 24 May 2024 01:09 )   PT: 12.9 sec;   INR: 1.18 ratio    PTT - ( 24 May 2024 01:09 )  PTT:25.6 sec    Urinalysis Basic - ( 24 May 2024 01:09 )  Color: x / Appearance: x / SG: x / pH: x  Gluc: 113 mg/dL / Ketone: x  / Bili: x / Urobili: x   Blood: x / Protein: x / Nitrite: x   Leuk Esterase: x / RBC: x / WBC x   Sq Epi: x / Non Sq Epi: x / Bacteria: x      PHYSICAL EXAM:  Constitutional: Well developed, well nourished, NAD  Pulmonary: Symmetric chest rise bilaterally, no increased WOB  Gastrointestinal: Abdomen soft, nontender, nondistended  Extremities: (+) Right AKA ACE dressing c/d/i. Warm to touch, soft. No cyanosis/erythema/edema/hematoma

## 2024-05-24 NOTE — PROGRESS NOTE ADULT - SUBJECTIVE AND OBJECTIVE BOX
Subjective: Patient seen and examined. No new events except as noted.     SUBJECTIVE/ROS:  has pain in site of surgery     MEDICATIONS:  MEDICATIONS  (STANDING):  albuterol/ipratropium for Nebulization 3 milliLiter(s) Nebulizer every 6 hours  aMIOdarone    Tablet 200 milliGRAM(s) Oral daily  amLODIPine   Tablet 5 milliGRAM(s) Oral daily  aspirin enteric coated 81 milliGRAM(s) Oral daily  atorvastatin 40 milliGRAM(s) Oral at bedtime  budesonide 160 MICROgram(s)/formoterol 4.5 MICROgram(s) Inhaler 2 Puff(s) Inhalation two times a day  chlorhexidine 2% Cloths 1 Application(s) Topical <User Schedule>  doxycycline monohydrate Capsule 100 milliGRAM(s) Oral every 12 hours  gabapentin 100 milliGRAM(s) Oral three times a day  insulin lispro (ADMELOG) corrective regimen sliding scale   SubCutaneous at bedtime  insulin lispro (ADMELOG) corrective regimen sliding scale   SubCutaneous three times a day before meals  melatonin 3 milliGRAM(s) Oral at bedtime  metoprolol succinate ER 25 milliGRAM(s) Oral daily  pantoprazole    Tablet 40 milliGRAM(s) Oral before breakfast  polyethylene glycol 3350 17 Gram(s) Oral every 12 hours  predniSONE   Tablet 50 milliGRAM(s) Oral daily  senna 2 Tablet(s) Oral at bedtime  tiotropium 2.5 MICROgram(s) Inhaler 2 Puff(s) Inhalation daily  torsemide 20 milliGRAM(s) Oral daily      PHYSICAL EXAM:  T(C): 36.6 (05-24-24 @ 05:00), Max: 36.8 (05-23-24 @ 23:00)  HR: 59 (05-24-24 @ 05:00) (53 - 65)  BP: 165/80 (05-24-24 @ 05:00) (128/72 - 165/80)  RR: 20 (05-24-24 @ 05:00) (13 - 31)  SpO2: 94% (05-24-24 @ 05:00) (88% - 96%)  Wt(kg): --  I&O's Summary    23 May 2024 07:01  -  24 May 2024 07:00  --------------------------------------------------------  IN: 1500 mL / OUT: 1270 mL / NET: 230 mL            JVP: Normal  Neck: supple  Lung: clear   CV: S1 S2 , Murmur:  Abd: soft  Ext: No edema right AKA   neuro: Awake / alert  Psych: flat affect  Skin: normal``    LABS/DATA:    CARDIAC MARKERS:  CARDIAC MARKERS ( 22 May 2024 04:01 )  x     / x     / 49 U/L / x     / x                                    11.6   14.18 )-----------( 151      ( 24 May 2024 01:09 )             39.4     05-24    139  |  102  |  63<H>  ----------------------------<  113<H>  4.4   |  22  |  1.78<H>    Ca    8.6      24 May 2024 01:09  Phos  2.7     05-24  Mg     2.4     05-24      proBNP:   Lipid Profile:   HgA1c:   TSH:     TELE:  EKG:

## 2024-05-24 NOTE — PROGRESS NOTE ADULT - ASSESSMENT
83F h/o CAD (1 stent placed), COPD, HTN, HLD, PAD s/p  right ilio-profunda and jump femoro-above knee popliteal bypass in summer 2018, brain aneurysm p/w 3d of RLE pain, numbness. States sxs started as b/l pain in soles of feet Monday night. Went to [facility] who dc'd with dx of plantar fasciitis. Today, R foot became numb and more painful. Also noted purple discoloration. Endorses rest pain, no wounds.     5/22: R AKA    PLAN:   Neurologic:   - AOx4  - Pain: Dilaudid + Oxycodone + Tylenol    Respiratory:   - Duoneb  - Solumedrol IV  - Budesonide  - Tiotropoium 2.5MICROgm  - NC@2L    Cardiovascular:   - MAP goal > 65   - home amiodarone  - metoprolol 25 daily  - amlodipine 10 daily    Gastrointestinal/Nutrition:   - Regular diet  - Bowel regimen (Miralax, senna)  - Protonix 40 mg QD    Renal/Genitourinary:   - Monitor urinary output  - Trend electrolytes and replete  - torsemide 20 daily    Hematologic:   - Heparin gtt - HOLD pert primary team, plan to re-start today  - ASA 81mg    Infectious Disease:   - ABX: Doxycyline (5/22-), Ancef (5/23-)  - 5/21 BCx: NGTD    Endocrine:   - Monitor glucose levels     Disposition: SICU

## 2024-05-24 NOTE — PROGRESS NOTE ADULT - ASSESSMENT
83 Female h/o CAD (1 stent placed), A-fib on Eliquis, COPD, HTN, HLD, PAD s/p  right ilio-profunda and jump femoro-above knee popliteal bypass in summer 2018, brain aneurysm p/w 3d of RLE pain and numbness concerning for Azc 2 acute limb ischemia. Right AKA cancelled on 5/20 due to desaturation, and was subsequently transferred to SICU 5/21 after another episode of desaturation. Now s/p R AKA (5/22).    PLAN:   - Oxycodone 10mg d/marco for severe pain; started Dilaudid 2mg q4h for severe pain  - D/c Parisi; f/u TOV  - Dressing change tomorrow (5/25)  - Abx: PO Doxycycline 5/22-5/27  - Diet: Reg/DASH diet  - Home meds as appropriate  - VTE ppx: Hold AC until after dressing change on POD#3 (5/25)      Vascular Surgery  [phone]

## 2024-05-24 NOTE — PROGRESS NOTE ADULT - SUBJECTIVE AND OBJECTIVE BOX
Follow-up Pulm Progress Note    o2 requirements fluctuating   denies SOB/CP  c/o some difficult to expectorate sputum     Medications:  MEDICATIONS  (STANDING):  albuterol/ipratropium for Nebulization 3 milliLiter(s) Nebulizer every 6 hours  aMIOdarone    Tablet 200 milliGRAM(s) Oral daily  amLODIPine   Tablet 5 milliGRAM(s) Oral daily  aspirin enteric coated 81 milliGRAM(s) Oral daily  atorvastatin 40 milliGRAM(s) Oral at bedtime  budesonide 160 MICROgram(s)/formoterol 4.5 MICROgram(s) Inhaler 2 Puff(s) Inhalation two times a day  chlorhexidine 2% Cloths 1 Application(s) Topical <User Schedule>  doxycycline monohydrate Capsule 100 milliGRAM(s) Oral every 12 hours  gabapentin 100 milliGRAM(s) Oral three times a day  insulin lispro (ADMELOG) corrective regimen sliding scale   SubCutaneous three times a day before meals  insulin lispro (ADMELOG) corrective regimen sliding scale   SubCutaneous at bedtime  melatonin 3 milliGRAM(s) Oral at bedtime  metoprolol succinate ER 25 milliGRAM(s) Oral daily  pantoprazole    Tablet 40 milliGRAM(s) Oral before breakfast  polyethylene glycol 3350 17 Gram(s) Oral every 12 hours  predniSONE   Tablet 50 milliGRAM(s) Oral daily  senna 2 Tablet(s) Oral at bedtime  tiotropium 2.5 MICROgram(s) Inhaler 2 Puff(s) Inhalation daily  torsemide 20 milliGRAM(s) Oral daily    MEDICATIONS  (PRN):  acetaminophen     Tablet .. 975 milliGRAM(s) Oral every 6 hours PRN Mild Pain (1 - 3)  oxyCODONE    IR 5 milliGRAM(s) Oral every 4 hours PRN Moderate Pain (4 - 6)          Vital Signs Last 24 Hrs  T(C): 36.5 (24 May 2024 08:38), Max: 36.8 (23 May 2024 23:00)  T(F): 97.7 (24 May 2024 08:38), Max: 98.2 (23 May 2024 23:00)  HR: 57 (24 May 2024 08:38) (53 - 65)  BP: 161/79 (24 May 2024 08:38) (128/72 - 165/80)  BP(mean): 98 (24 May 2024 04:00) (89 - 104)  RR: 19 (24 May 2024 08:38) (13 - 30)  SpO2: 93% (24 May 2024 08:38) (88% - 96%)    Parameters below as of 24 May 2024 08:38  Patient On (Oxygen Delivery Method): room air          VBG pH 7.35 05-24 @ 00:51    VBG pCO2 43 05-24 @ 00:51    VBG O2 sat 90.8 05-24 @ 00:51    VBG lactate 1.3 05-24 @ 00:51  VBG pH 7.35 05-23 @ 00:07    VBG pCO2 43 05-23 @ 00:07    VBG O2 sat 91.1 05-23 @ 00:07    VBG lactate 2.2 05-23 @ 00:07  VBG pH 7.30 05-22 @ 16:48    VBG pCO2 52 05-22 @ 16:48    VBG O2 sat 48.2 05-22 @ 16:48    VBG lactate 2.1 05-22 @ 16:48      05-23 @ 07:01  -  05-24 @ 07:00  --------------------------------------------------------  IN: 1500 mL / OUT: 1270 mL / NET: 230 mL          LABS:                        11.6   14.18 )-----------( 151      ( 24 May 2024 01:09 )             39.4     05-24    139  |  102  |  63<H>  ----------------------------<  113<H>  4.4   |  22  |  1.78<H>    Ca    8.6      24 May 2024 01:09  Phos  2.7     05-24  Mg     2.4     05-24            CAPILLARY BLOOD GLUCOSE      POCT Blood Glucose.: 117 mg/dL (24 May 2024 08:38)    PT/INR - ( 24 May 2024 01:09 )   PT: 12.9 sec;   INR: 1.18 ratio         PTT - ( 24 May 2024 01:09 )  PTT:25.6 sec  Urinalysis Basic - ( 24 May 2024 01:09 )    Color: x / Appearance: x / SG: x / pH: x  Gluc: 113 mg/dL / Ketone: x  / Bili: x / Urobili: x   Blood: x / Protein: x / Nitrite: x   Leuk Esterase: x / RBC: x / WBC x   Sq Epi: x / Non Sq Epi: x / Bacteria: x                      CULTURES:    Culture - Blood (collected 05-21-24 @ 11:01)  Source: .Blood Blood-Peripheral  Preliminary Report (05-23-24 @ 16:02):    No growth at 48 Hours    Culture - Blood (collected 05-21-24 @ 11:01)  Source: .Blood Blood-Peripheral  Preliminary Report (05-23-24 @ 16:02):    No growth at 48 Hours                      Physical Examination:  PULM: decreased BS, no wheezing   CVS: S1, S2 heard    RADIOLOGY REVIEWED      CT chest:  < from: CT Chest No Cont (05.19.24 @ 12:08) >  FINDINGS:    LUNGS AND AIRWAYS: Patent central airways.  Severe upper lobe predominant   centrilobular emphysema. Bibasilar dependent atelectasis.  PLEURA: Small left pleural effusion.  MEDIASTINUM AND MAIN: Enlarged mediastinal lymph nodes including a   subcarinal lymph node measures 2.5 cm short axis in AP window lymph node   measures up to 1.3 cm short axis.  VESSELS: Coronary artery and aortic atherosclerosis.4 cm pulmonary artery   calcium seen in pulmonary hypertension.  HEART: Heart is enlarged No pericardial effusion. Aortic valve   calcifications.  CHEST WALL AND LOWER NECK: 1 cm left supraclavicular lymph node.  VISUALIZED UPPER ABDOMEN: Similar splenomegaly. Cholelithiasis.  BONES: Within normal limits.    IMPRESSION:  Indeterminate mediastinal and supraclavicular adenopathy    --- End of Report ---    < end of copied text >

## 2024-05-25 LAB
ANION GAP SERPL CALC-SCNC: 11 MMOL/L — SIGNIFICANT CHANGE UP (ref 5–17)
ANION GAP SERPL CALC-SCNC: 12 MMOL/L — SIGNIFICANT CHANGE UP (ref 5–17)
BUN SERPL-MCNC: 65 MG/DL — HIGH (ref 7–23)
BUN SERPL-MCNC: 66 MG/DL — HIGH (ref 7–23)
CALCIUM SERPL-MCNC: 9.3 MG/DL — SIGNIFICANT CHANGE UP (ref 8.4–10.5)
CALCIUM SERPL-MCNC: 9.3 MG/DL — SIGNIFICANT CHANGE UP (ref 8.4–10.5)
CHLORIDE SERPL-SCNC: 104 MMOL/L — SIGNIFICANT CHANGE UP (ref 96–108)
CHLORIDE SERPL-SCNC: 106 MMOL/L — SIGNIFICANT CHANGE UP (ref 96–108)
CO2 SERPL-SCNC: 24 MMOL/L — SIGNIFICANT CHANGE UP (ref 22–31)
CO2 SERPL-SCNC: 25 MMOL/L — SIGNIFICANT CHANGE UP (ref 22–31)
CREAT SERPL-MCNC: 1.46 MG/DL — HIGH (ref 0.5–1.3)
CREAT SERPL-MCNC: 1.57 MG/DL — HIGH (ref 0.5–1.3)
EGFR: 33 ML/MIN/1.73M2 — LOW
EGFR: 36 ML/MIN/1.73M2 — LOW
GLUCOSE BLDC GLUCOMTR-MCNC: 151 MG/DL — HIGH (ref 70–99)
GLUCOSE BLDC GLUCOMTR-MCNC: 154 MG/DL — HIGH (ref 70–99)
GLUCOSE BLDC GLUCOMTR-MCNC: 156 MG/DL — HIGH (ref 70–99)
GLUCOSE BLDC GLUCOMTR-MCNC: 159 MG/DL — HIGH (ref 70–99)
GLUCOSE BLDC GLUCOMTR-MCNC: 228 MG/DL — HIGH (ref 70–99)
GLUCOSE SERPL-MCNC: 111 MG/DL — HIGH (ref 70–99)
GLUCOSE SERPL-MCNC: 218 MG/DL — HIGH (ref 70–99)
HCT VFR BLD CALC: 38 % — SIGNIFICANT CHANGE UP (ref 34.5–45)
HCT VFR BLD CALC: 39.9 % — SIGNIFICANT CHANGE UP (ref 34.5–45)
HGB BLD-MCNC: 11.4 G/DL — LOW (ref 11.5–15.5)
HGB BLD-MCNC: 11.7 G/DL — SIGNIFICANT CHANGE UP (ref 11.5–15.5)
MAGNESIUM SERPL-MCNC: 2.1 MG/DL — SIGNIFICANT CHANGE UP (ref 1.6–2.6)
MAGNESIUM SERPL-MCNC: 2.2 MG/DL — SIGNIFICANT CHANGE UP (ref 1.6–2.6)
MCHC RBC-ENTMCNC: 23 PG — LOW (ref 27–34)
MCHC RBC-ENTMCNC: 23.8 PG — LOW (ref 27–34)
MCHC RBC-ENTMCNC: 29.3 GM/DL — LOW (ref 32–36)
MCHC RBC-ENTMCNC: 30 GM/DL — LOW (ref 32–36)
MCV RBC AUTO: 78.5 FL — LOW (ref 80–100)
MCV RBC AUTO: 79.2 FL — LOW (ref 80–100)
NRBC # BLD: 0 /100 WBCS — SIGNIFICANT CHANGE UP (ref 0–0)
NRBC # BLD: 0 /100 WBCS — SIGNIFICANT CHANGE UP (ref 0–0)
PHOSPHATE SERPL-MCNC: 2.7 MG/DL — SIGNIFICANT CHANGE UP (ref 2.5–4.5)
PHOSPHATE SERPL-MCNC: 2.9 MG/DL — SIGNIFICANT CHANGE UP (ref 2.5–4.5)
PLATELET # BLD AUTO: 212 K/UL — SIGNIFICANT CHANGE UP (ref 150–400)
PLATELET # BLD AUTO: 229 K/UL — SIGNIFICANT CHANGE UP (ref 150–400)
POTASSIUM SERPL-MCNC: 4.5 MMOL/L — SIGNIFICANT CHANGE UP (ref 3.5–5.3)
POTASSIUM SERPL-MCNC: 4.9 MMOL/L — SIGNIFICANT CHANGE UP (ref 3.5–5.3)
POTASSIUM SERPL-SCNC: 4.5 MMOL/L — SIGNIFICANT CHANGE UP (ref 3.5–5.3)
POTASSIUM SERPL-SCNC: 4.9 MMOL/L — SIGNIFICANT CHANGE UP (ref 3.5–5.3)
RBC # BLD: 4.8 M/UL — SIGNIFICANT CHANGE UP (ref 3.8–5.2)
RBC # BLD: 5.08 M/UL — SIGNIFICANT CHANGE UP (ref 3.8–5.2)
RBC # FLD: 21 % — HIGH (ref 10.3–14.5)
RBC # FLD: 21.2 % — HIGH (ref 10.3–14.5)
SODIUM SERPL-SCNC: 140 MMOL/L — SIGNIFICANT CHANGE UP (ref 135–145)
SODIUM SERPL-SCNC: 142 MMOL/L — SIGNIFICANT CHANGE UP (ref 135–145)
WBC # BLD: 12.91 K/UL — HIGH (ref 3.8–10.5)
WBC # BLD: 16.75 K/UL — HIGH (ref 3.8–10.5)
WBC # FLD AUTO: 12.91 K/UL — HIGH (ref 3.8–10.5)
WBC # FLD AUTO: 16.75 K/UL — HIGH (ref 3.8–10.5)

## 2024-05-25 RX ORDER — HYDROMORPHONE HYDROCHLORIDE 2 MG/ML
0.2 INJECTION INTRAMUSCULAR; INTRAVENOUS; SUBCUTANEOUS ONCE
Refills: 0 | Status: DISCONTINUED | OUTPATIENT
Start: 2024-05-25 | End: 2024-05-25

## 2024-05-25 RX ORDER — SODIUM,POTASSIUM PHOSPHATES 278-250MG
1 POWDER IN PACKET (EA) ORAL
Refills: 0 | Status: COMPLETED | OUTPATIENT
Start: 2024-05-25 | End: 2024-05-25

## 2024-05-25 RX ADMIN — POLYETHYLENE GLYCOL 3350 17 GRAM(S): 17 POWDER, FOR SOLUTION ORAL at 12:25

## 2024-05-25 RX ADMIN — GABAPENTIN 200 MILLIGRAM(S): 400 CAPSULE ORAL at 05:01

## 2024-05-25 RX ADMIN — APIXABAN 2.5 MILLIGRAM(S): 2.5 TABLET, FILM COATED ORAL at 05:01

## 2024-05-25 RX ADMIN — Medication 975 MILLIGRAM(S): at 18:46

## 2024-05-25 RX ADMIN — APIXABAN 2.5 MILLIGRAM(S): 2.5 TABLET, FILM COATED ORAL at 18:17

## 2024-05-25 RX ADMIN — Medication 2: at 08:16

## 2024-05-25 RX ADMIN — Medication 100 MILLIGRAM(S): at 18:16

## 2024-05-25 RX ADMIN — Medication 100 MILLIGRAM(S): at 05:00

## 2024-05-25 RX ADMIN — BUDESONIDE AND FORMOTEROL FUMARATE DIHYDRATE 2 PUFF(S): 160; 4.5 AEROSOL RESPIRATORY (INHALATION) at 18:17

## 2024-05-25 RX ADMIN — Medication 975 MILLIGRAM(S): at 05:01

## 2024-05-25 RX ADMIN — GABAPENTIN 200 MILLIGRAM(S): 400 CAPSULE ORAL at 21:06

## 2024-05-25 RX ADMIN — PANTOPRAZOLE SODIUM 40 MILLIGRAM(S): 20 TABLET, DELAYED RELEASE ORAL at 05:01

## 2024-05-25 RX ADMIN — Medication 20 MILLIGRAM(S): at 05:00

## 2024-05-25 RX ADMIN — Medication 1 MILLIGRAM(S): at 20:28

## 2024-05-25 RX ADMIN — Medication 4: at 18:31

## 2024-05-25 RX ADMIN — Medication 3 MILLILITER(S): at 12:24

## 2024-05-25 RX ADMIN — AMIODARONE HYDROCHLORIDE 200 MILLIGRAM(S): 400 TABLET ORAL at 05:02

## 2024-05-25 RX ADMIN — BUDESONIDE AND FORMOTEROL FUMARATE DIHYDRATE 2 PUFF(S): 160; 4.5 AEROSOL RESPIRATORY (INHALATION) at 05:02

## 2024-05-25 RX ADMIN — Medication 1 TABLET(S): at 12:24

## 2024-05-25 RX ADMIN — Medication 3 MILLILITER(S): at 18:16

## 2024-05-25 RX ADMIN — Medication 50 MILLIGRAM(S): at 05:00

## 2024-05-25 RX ADMIN — Medication 975 MILLIGRAM(S): at 06:01

## 2024-05-25 RX ADMIN — GABAPENTIN 200 MILLIGRAM(S): 400 CAPSULE ORAL at 13:52

## 2024-05-25 RX ADMIN — ATORVASTATIN CALCIUM 40 MILLIGRAM(S): 80 TABLET, FILM COATED ORAL at 21:06

## 2024-05-25 RX ADMIN — Medication 975 MILLIGRAM(S): at 18:16

## 2024-05-25 RX ADMIN — AMLODIPINE BESYLATE 5 MILLIGRAM(S): 2.5 TABLET ORAL at 05:01

## 2024-05-25 RX ADMIN — Medication 975 MILLIGRAM(S): at 13:15

## 2024-05-25 RX ADMIN — Medication 3 MILLILITER(S): at 05:02

## 2024-05-25 RX ADMIN — HYDROMORPHONE HYDROCHLORIDE 0.2 MILLIGRAM(S): 2 INJECTION INTRAMUSCULAR; INTRAVENOUS; SUBCUTANEOUS at 10:19

## 2024-05-25 RX ADMIN — TIOTROPIUM BROMIDE 2 PUFF(S): 18 CAPSULE ORAL; RESPIRATORY (INHALATION) at 12:24

## 2024-05-25 RX ADMIN — Medication 3 MILLIGRAM(S): at 21:06

## 2024-05-25 RX ADMIN — Medication 25 MILLIGRAM(S): at 05:01

## 2024-05-25 RX ADMIN — Medication 1 TABLET(S): at 10:22

## 2024-05-25 RX ADMIN — Medication 2: at 12:22

## 2024-05-25 RX ADMIN — Medication 1 MILLIGRAM(S): at 08:16

## 2024-05-25 RX ADMIN — Medication 975 MILLIGRAM(S): at 12:23

## 2024-05-25 RX ADMIN — HYDROMORPHONE HYDROCHLORIDE 0.2 MILLIGRAM(S): 2 INJECTION INTRAMUSCULAR; INTRAVENOUS; SUBCUTANEOUS at 11:00

## 2024-05-25 NOTE — PROGRESS NOTE ADULT - SUBJECTIVE AND OBJECTIVE BOX
Name of Patient : ANGIE LOPES  MRN: [id number]      Subjective: Patient seen and examined. No new events except as noted.     REVIEW OF SYSTEMS:    CONSTITUTIONAL: No weakness, fevers or chills  EYES/ENT: No visual changes;  No vertigo or throat pain   NECK: No pain or stiffness  RESPIRATORY: No cough, wheezing, hemoptysis; No shortness of breath  CARDIOVASCULAR: No chest pain or palpitations  GASTROINTESTINAL: No abdominal or epigastric pain. No nausea, vomiting, or hematemesis; No diarrhea or constipation. No melena or hematochezia.  GENITOURINARY: No dysuria, frequency or hematuria  NEUROLOGICAL: No numbness or weakness  SKIN: No itching, burning, rashes, or lesions   All other review of systems is negative unless indicated above.    MEDICATIONS:  MEDICATIONS  (STANDING):  acetaminophen     Tablet .. 975 milliGRAM(s) Oral every 6 hours  albuterol/ipratropium for Nebulization 3 milliLiter(s) Nebulizer every 6 hours  aMIOdarone    Tablet 200 milliGRAM(s) Oral daily  amLODIPine   Tablet 5 milliGRAM(s) Oral daily  anagrelide 1 milliGRAM(s) Oral <User Schedule>  apixaban 2.5 milliGRAM(s) Oral every 12 hours  atorvastatin 40 milliGRAM(s) Oral at bedtime  budesonide 160 MICROgram(s)/formoterol 4.5 MICROgram(s) Inhaler 2 Puff(s) Inhalation two times a day  doxycycline monohydrate Capsule 100 milliGRAM(s) Oral every 12 hours  gabapentin 200 milliGRAM(s) Oral three times a day  insulin lispro (ADMELOG) corrective regimen sliding scale   SubCutaneous three times a day before meals  insulin lispro (ADMELOG) corrective regimen sliding scale   SubCutaneous at bedtime  melatonin 3 milliGRAM(s) Oral at bedtime  metoprolol succinate ER 25 milliGRAM(s) Oral daily  pantoprazole    Tablet 40 milliGRAM(s) Oral before breakfast  polyethylene glycol 3350 17 Gram(s) Oral every 12 hours  predniSONE   Tablet 50 milliGRAM(s) Oral daily  senna 2 Tablet(s) Oral at bedtime  tiotropium 2.5 MICROgram(s) Inhaler 2 Puff(s) Inhalation daily  torsemide 20 milliGRAM(s) Oral daily      PHYSICAL EXAM:  T(C): 36.6 (05-25-24 @ 20:35), Max: 36.7 (05-25-24 @ 00:05)  HR: 64 (05-25-24 @ 20:35) (59 - 73)  BP: 148/73 (05-25-24 @ 20:35) (146/67 - 165/70)  RR: 18 (05-25-24 @ 20:35) (18 - 18)  SpO2: 95% (05-25-24 @ 20:35) (93% - 97%)  Wt(kg): --  I&O's Summary    24 May 2024 07:01  -  25 May 2024 07:00  --------------------------------------------------------  IN: 500 mL / OUT: 1850 mL / NET: -1350 mL    25 May 2024 07:01  -  26 May 2024 00:04  --------------------------------------------------------  IN: 150 mL / OUT: 1500 mL / NET: -1350 mL          Appearance: Normal	  HEENT:  PERRLA   Lymphatic: No lymphadenopathy   Cardiovascular: Normal S1 S2, no JVD  Respiratory: normal effort , clear  Gastrointestinal:  Soft, Non-tender  Skin: No rashes,  warm to touch  Psychiatry:  Mood & affect appropriate  Musculuskeletal: No edema    recent labs, Imaging and EKGs personally reviewed       05-24-24 @ 07:01  -  05-25-24 @ 07:00  --------------------------------------------------------  IN: 500 mL / OUT: 1850 mL / NET: -1350 mL    05-25-24 @ 07:01  -  05-26-24 @ 00:04  --------------------------------------------------------  IN: 150 mL / OUT: 1500 mL / NET: -1350 mL                          11.7   16.75 )-----------( 229      ( 25 May 2024 09:55 )             39.9               05-25    140  |  104  |  66<H>  ----------------------------<  218<H>  4.5   |  24  |  1.46<H>    Ca    9.3      25 May 2024 09:55  Phos  2.7     05-25  Mg     2.1     05-25      PT/INR - ( 24 May 2024 01:09 )   PT: 12.9 sec;   INR: 1.18 ratio         PTT - ( 24 May 2024 01:09 )  PTT:25.6 sec                   Urinalysis Basic - ( 25 May 2024 09:55 )    Color: x / Appearance: x / SG: x / pH: x  Gluc: 218 mg/dL / Ketone: x  / Bili: x / Urobili: x   Blood: x / Protein: x / Nitrite: x   Leuk Esterase: x / RBC: x / WBC x   Sq Epi: x / Non Sq Epi: x / Bacteria: x

## 2024-05-25 NOTE — PROGRESS NOTE ADULT - SUBJECTIVE AND OBJECTIVE BOX
Vascular Surgery Progress Note    SUBJECTIVE  The patient was seen and examined. No acute events overnight. Pain controlled, afebrile w/ stable vitals.     OBJECTIVE  ___________________________________________________  VITAL SIGNS / I&O's   Vital Signs Last 24 Hrs  T(C): 36.7 (25 May 2024 05:08), Max: 37.1 (24 May 2024 16:35)  T(F): 98.1 (25 May 2024 05:08), Max: 98.8 (24 May 2024 16:35)  HR: 68 (25 May 2024 05:08) (55 - 73)  BP: 165/70 (25 May 2024 05:08) (132/63 - 165/70)  BP(mean): --  RR: 18 (25 May 2024 05:08) (18 - 19)  SpO2: 97% (25 May 2024 05:08) (90% - 97%)    Parameters below as of 25 May 2024 05:08  Patient On (Oxygen Delivery Method): room air          24 May 2024 07:01  -  25 May 2024 07:00  --------------------------------------------------------  IN:    Oral Fluid: 500 mL  Total IN: 500 mL    OUT:    Indwelling Catheter - Urethral (mL): 1200 mL    Voided (mL): 650 mL  Total OUT: 1850 mL    Total NET: -1350 mL        ___________________________________________________  PHYSICAL EXAM    Constitutional: Well developed, well nourished, NAD  Pulmonary: Symmetric chest rise bilaterally, no increased WOB  Gastrointestinal: Abdomen soft, nontender, nondistended  Extremities: (+) Right AKA ACE dressing c/d/i. Warm to touch, soft. No cyanosis/erythema/edema/hematoma  ___________________________________________________  LABS                        11.4   12.91 )-----------( 212      ( 25 May 2024 06:39 )             38.0     25 May 2024 06:40    142    |  106    |  65     ----------------------------<  111    4.9     |  25     |  1.57     Ca    9.3        25 May 2024 06:40  Phos  2.9       25 May 2024 06:40  Mg     2.2       25 May 2024 06:40      PT/INR - ( 24 May 2024 01:09 )   PT: 12.9 sec;   INR: 1.18 ratio         PTT - ( 24 May 2024 01:09 )  PTT:25.6 sec  CAPILLARY BLOOD GLUCOSE      POCT Blood Glucose.: 154 mg/dL (25 May 2024 08:14)  POCT Blood Glucose.: 153 mg/dL (24 May 2024 22:07)  POCT Blood Glucose.: 194 mg/dL (24 May 2024 17:24)  POCT Blood Glucose.: 167 mg/dL (24 May 2024 12:03)  POCT Blood Glucose.: 117 mg/dL (24 May 2024 08:38)        Urinalysis Basic - ( 25 May 2024 06:40 )    Color: x / Appearance: x / SG: x / pH: x  Gluc: 111 mg/dL / Ketone: x  / Bili: x / Urobili: x   Blood: x / Protein: x / Nitrite: x   Leuk Esterase: x / RBC: x / WBC x   Sq Epi: x / Non Sq Epi: x / Bacteria: x      ___________________________________________________  MICRO  Recent Cultures:  Specimen Source: .Blood Blood-Peripheral, 05-21 @ 11:01; Results   No growth at 72 Hours; Gram Stain: --; Organism: --    ___________________________________________________  MEDICATIONS  (STANDING):  acetaminophen     Tablet .. 975 milliGRAM(s) Oral every 6 hours  albuterol/ipratropium for Nebulization 3 milliLiter(s) Nebulizer every 6 hours  aMIOdarone    Tablet 200 milliGRAM(s) Oral daily  amLODIPine   Tablet 5 milliGRAM(s) Oral daily  anagrelide 1 milliGRAM(s) Oral <User Schedule>  apixaban 2.5 milliGRAM(s) Oral every 12 hours  atorvastatin 40 milliGRAM(s) Oral at bedtime  budesonide 160 MICROgram(s)/formoterol 4.5 MICROgram(s) Inhaler 2 Puff(s) Inhalation two times a day  doxycycline monohydrate Capsule 100 milliGRAM(s) Oral every 12 hours  gabapentin 200 milliGRAM(s) Oral three times a day  insulin lispro (ADMELOG) corrective regimen sliding scale   SubCutaneous three times a day before meals  insulin lispro (ADMELOG) corrective regimen sliding scale   SubCutaneous at bedtime  melatonin 3 milliGRAM(s) Oral at bedtime  metoprolol succinate ER 25 milliGRAM(s) Oral daily  pantoprazole    Tablet 40 milliGRAM(s) Oral before breakfast  polyethylene glycol 3350 17 Gram(s) Oral every 12 hours  predniSONE   Tablet 50 milliGRAM(s) Oral daily  senna 2 Tablet(s) Oral at bedtime  tiotropium 2.5 MICROgram(s) Inhaler 2 Puff(s) Inhalation daily  torsemide 20 milliGRAM(s) Oral daily    MEDICATIONS  (PRN):  HYDROmorphone   Tablet 2 milliGRAM(s) Oral every 4 hours PRN Severe Pain (7 - 10)  HYDROmorphone  Injectable 0.2 milliGRAM(s) IV Push every 4 hours PRN Breakthrough pain not relieved by PO meds  oxyCODONE    IR 5 milliGRAM(s) Oral every 4 hours PRN Moderate Pain (4 - 6)

## 2024-05-25 NOTE — PROGRESS NOTE ADULT - ASSESSMENT
83F h/o CAD (1 stent placed), COPD, HTN, HLD, PAF on amio and a/c , PAD s/p  right ilio-profunda and jump femoro-above knee popliteal bypass in summer 2018, brain aneurysm p/w 3d of RLE pain, numbness. States sxs started as b/l pain in soles of feet Monday night. Went to [facility] who dc'd with dx of plantar fasciitis. Today, R foot became numb and more painful. Also noted purple discoloration. Endorses rest pain, no wounds.       # PAD  - Pt with PAD to her RLE  - Per Vascular surgery       # CAD   - On Heparin gtt; Monitor Ptt and adjust as per normogram, monitor H/H  - On Plavix and Statin therapies  - BP control  - TTE as noted with Grade II DD   - Monitor on tele  - Cardio follow up     # TOBIAS  - Likely SCHUYLER  - Monitor urine output   - Check bladder scan  - Avoid nephrotoxic medication  - PO/Oral hydration   - Cr Up-trending, continue to monitor and trend. Suggest gentle IVF for hydration. PO intake postoperatively     # A fib  - On Amiodarone  - Rate control - monitor on tele; monitor electrolytes    - On AC with heparin gtt  - Cardio follow up     # COPD  - On Bronchodilators  - On O2 supplementation; Maintain O2 > 88%    - Pulm eval appreciated; F/u recs    Abnormal CT Chest  - Pt with reported PET/CT outpatient due to enlarged lymphadenopathy  - CT C here with severe upper lobe centrilobar emphysema, enlarged Mediastinal LN, subcarinal node, 1cm supraclavicular LN, Splenomegaly   - Consider in versus outpatient biopsy to rule out lymphoma.  Follow up closely with Pulmonology      PPX

## 2024-05-25 NOTE — PROGRESS NOTE ADULT - SUBJECTIVE AND OBJECTIVE BOX
Subjective: Patient seen and examined. No new events except as noted.     SUBJECTIVE/ROS:  No chest pain, dyspnea, palpitation, or dizziness.   has mild cough     MEDICATIONS:  MEDICATIONS  (STANDING):  acetaminophen     Tablet .. 975 milliGRAM(s) Oral every 6 hours  albuterol/ipratropium for Nebulization 3 milliLiter(s) Nebulizer every 6 hours  aMIOdarone    Tablet 200 milliGRAM(s) Oral daily  amLODIPine   Tablet 5 milliGRAM(s) Oral daily  anagrelide 1 milliGRAM(s) Oral <User Schedule>  apixaban 2.5 milliGRAM(s) Oral every 12 hours  atorvastatin 40 milliGRAM(s) Oral at bedtime  budesonide 160 MICROgram(s)/formoterol 4.5 MICROgram(s) Inhaler 2 Puff(s) Inhalation two times a day  doxycycline monohydrate Capsule 100 milliGRAM(s) Oral every 12 hours  gabapentin 200 milliGRAM(s) Oral three times a day  insulin lispro (ADMELOG) corrective regimen sliding scale   SubCutaneous three times a day before meals  insulin lispro (ADMELOG) corrective regimen sliding scale   SubCutaneous at bedtime  melatonin 3 milliGRAM(s) Oral at bedtime  metoprolol succinate ER 25 milliGRAM(s) Oral daily  pantoprazole    Tablet 40 milliGRAM(s) Oral before breakfast  polyethylene glycol 3350 17 Gram(s) Oral every 12 hours  predniSONE   Tablet 50 milliGRAM(s) Oral daily  senna 2 Tablet(s) Oral at bedtime  tiotropium 2.5 MICROgram(s) Inhaler 2 Puff(s) Inhalation daily  torsemide 20 milliGRAM(s) Oral daily      PHYSICAL EXAM:  T(C): 36.7 (05-25-24 @ 05:08), Max: 37.1 (05-24-24 @ 16:35)  HR: 68 (05-25-24 @ 05:08) (55 - 73)  BP: 165/70 (05-25-24 @ 05:08) (132/63 - 165/70)  RR: 18 (05-25-24 @ 05:08) (18 - 19)  SpO2: 97% (05-25-24 @ 05:08) (90% - 97%)  Wt(kg): --  I&O's Summary    24 May 2024 07:01  -  25 May 2024 07:00  --------------------------------------------------------  IN: 500 mL / OUT: 1850 mL / NET: -1350 mL            JVP: Normal  Neck: supple  Lung: clear   CV: S1 S2 , Murmur:  Abd: soft  Ext: No edema  neuro: Awake / alert  Psych: flat affect  Skin: normal``    LABS/DATA:    CARDIAC MARKERS:                                11.4   12.91 )-----------( 212      ( 25 May 2024 06:39 )             38.0     05-25    142  |  106  |  65<H>  ----------------------------<  111<H>  4.9   |  25  |  1.57<H>    Ca    9.3      25 May 2024 06:40  Phos  2.9     05-25  Mg     2.2     05-25      proBNP:   Lipid Profile:   HgA1c:   TSH:     TELE:  EKG:

## 2024-05-25 NOTE — PROGRESS NOTE ADULT - ASSESSMENT
83 Female h/o CAD (1 stent placed), A-fib on Eliquis, COPD, HTN, HLD, PAD s/p  right ilio-profunda and jump femoro-above knee popliteal bypass in summer 2018, brain aneurysm p/w 3d of RLE pain and numbness concerning for Zac 2 acute limb ischemia. Right AKA cancelled on 5/20 due to desaturation, and was subsequently transferred to SICU 5/21 after another episode of desaturation. Now s/p R AKA (5/22).    PLAN:   - Oxycodone 10mg d/marco for severe pain; started Dilaudid 2mg q4h for severe pain  - Dressing change to R AKA stump starting today 5/25 with xeroform, gauze, izabella, ACE  - Abx: PO Doxycycline 5/22-5/27  - Diet: Reg/DASH diet  - Home meds as appropriate  - Dispo: Acute rehab      Vascular Surgery  [phone]

## 2024-05-25 NOTE — PROGRESS NOTE ADULT - ASSESSMENT
PAD  s/p AKA  fu with vascular surgery   pain control    CAD  s/p PCI   recent cath jan 2024 with patent stent  on a/c and anagrelide   off asa, plavix , high bleed risk   on statin   on BB    PAF  on amio  avoid qt prolonging drugs  resume a/c once deemed safe from vascular surgery

## 2024-05-26 LAB
ANION GAP SERPL CALC-SCNC: 12 MMOL/L — SIGNIFICANT CHANGE UP (ref 5–17)
BUN SERPL-MCNC: 71 MG/DL — HIGH (ref 7–23)
CALCIUM SERPL-MCNC: 9.1 MG/DL — SIGNIFICANT CHANGE UP (ref 8.4–10.5)
CHLORIDE SERPL-SCNC: 107 MMOL/L — SIGNIFICANT CHANGE UP (ref 96–108)
CO2 SERPL-SCNC: 24 MMOL/L — SIGNIFICANT CHANGE UP (ref 22–31)
CREAT ?TM UR-MCNC: 37 MG/DL — SIGNIFICANT CHANGE UP
CREAT SERPL-MCNC: 1.81 MG/DL — HIGH (ref 0.5–1.3)
CULTURE RESULTS: SIGNIFICANT CHANGE UP
CULTURE RESULTS: SIGNIFICANT CHANGE UP
EGFR: 28 ML/MIN/1.73M2 — LOW
GLUCOSE BLDC GLUCOMTR-MCNC: 123 MG/DL — HIGH (ref 70–99)
GLUCOSE BLDC GLUCOMTR-MCNC: 143 MG/DL — HIGH (ref 70–99)
GLUCOSE BLDC GLUCOMTR-MCNC: 161 MG/DL — HIGH (ref 70–99)
GLUCOSE BLDC GLUCOMTR-MCNC: 162 MG/DL — HIGH (ref 70–99)
GLUCOSE SERPL-MCNC: 85 MG/DL — SIGNIFICANT CHANGE UP (ref 70–99)
HCT VFR BLD CALC: 39.1 % — SIGNIFICANT CHANGE UP (ref 34.5–45)
HGB BLD-MCNC: 11.3 G/DL — LOW (ref 11.5–15.5)
MAGNESIUM SERPL-MCNC: 2.1 MG/DL — SIGNIFICANT CHANGE UP (ref 1.6–2.6)
MCHC RBC-ENTMCNC: 23.2 PG — LOW (ref 27–34)
MCHC RBC-ENTMCNC: 28.9 GM/DL — LOW (ref 32–36)
MCV RBC AUTO: 80.3 FL — SIGNIFICANT CHANGE UP (ref 80–100)
NRBC # BLD: 0 /100 WBCS — SIGNIFICANT CHANGE UP (ref 0–0)
OSMOLALITY UR: 416 MOS/KG — SIGNIFICANT CHANGE UP (ref 300–900)
PHOSPHATE SERPL-MCNC: 3.5 MG/DL — SIGNIFICANT CHANGE UP (ref 2.5–4.5)
PLATELET # BLD AUTO: 260 K/UL — SIGNIFICANT CHANGE UP (ref 150–400)
POTASSIUM SERPL-MCNC: 4.6 MMOL/L — SIGNIFICANT CHANGE UP (ref 3.5–5.3)
POTASSIUM SERPL-SCNC: 4.6 MMOL/L — SIGNIFICANT CHANGE UP (ref 3.5–5.3)
RBC # BLD: 4.87 M/UL — SIGNIFICANT CHANGE UP (ref 3.8–5.2)
RBC # FLD: 21.1 % — HIGH (ref 10.3–14.5)
SODIUM SERPL-SCNC: 143 MMOL/L — SIGNIFICANT CHANGE UP (ref 135–145)
SODIUM UR-SCNC: 61 MMOL/L — SIGNIFICANT CHANGE UP
SPECIMEN SOURCE: SIGNIFICANT CHANGE UP
SPECIMEN SOURCE: SIGNIFICANT CHANGE UP
WBC # BLD: 14.04 K/UL — HIGH (ref 3.8–10.5)
WBC # FLD AUTO: 14.04 K/UL — HIGH (ref 3.8–10.5)

## 2024-05-26 RX ORDER — OXYCODONE HYDROCHLORIDE 5 MG/1
5 TABLET ORAL EVERY 4 HOURS
Refills: 0 | Status: DISCONTINUED | OUTPATIENT
Start: 2024-05-26 | End: 2024-05-28

## 2024-05-26 RX ADMIN — Medication 20 MILLIGRAM(S): at 05:03

## 2024-05-26 RX ADMIN — Medication 975 MILLIGRAM(S): at 12:30

## 2024-05-26 RX ADMIN — AMLODIPINE BESYLATE 5 MILLIGRAM(S): 2.5 TABLET ORAL at 05:02

## 2024-05-26 RX ADMIN — BUDESONIDE AND FORMOTEROL FUMARATE DIHYDRATE 2 PUFF(S): 160; 4.5 AEROSOL RESPIRATORY (INHALATION) at 05:01

## 2024-05-26 RX ADMIN — Medication 1 MILLIGRAM(S): at 08:09

## 2024-05-26 RX ADMIN — APIXABAN 2.5 MILLIGRAM(S): 2.5 TABLET, FILM COATED ORAL at 05:03

## 2024-05-26 RX ADMIN — Medication 50 MILLIGRAM(S): at 05:03

## 2024-05-26 RX ADMIN — Medication 100 MILLIGRAM(S): at 05:03

## 2024-05-26 RX ADMIN — Medication 975 MILLIGRAM(S): at 06:03

## 2024-05-26 RX ADMIN — AMIODARONE HYDROCHLORIDE 200 MILLIGRAM(S): 400 TABLET ORAL at 05:03

## 2024-05-26 RX ADMIN — APIXABAN 2.5 MILLIGRAM(S): 2.5 TABLET, FILM COATED ORAL at 17:47

## 2024-05-26 RX ADMIN — BUDESONIDE AND FORMOTEROL FUMARATE DIHYDRATE 2 PUFF(S): 160; 4.5 AEROSOL RESPIRATORY (INHALATION) at 17:47

## 2024-05-26 RX ADMIN — PANTOPRAZOLE SODIUM 40 MILLIGRAM(S): 20 TABLET, DELAYED RELEASE ORAL at 05:03

## 2024-05-26 RX ADMIN — ATORVASTATIN CALCIUM 40 MILLIGRAM(S): 80 TABLET, FILM COATED ORAL at 21:28

## 2024-05-26 RX ADMIN — GABAPENTIN 200 MILLIGRAM(S): 400 CAPSULE ORAL at 05:04

## 2024-05-26 RX ADMIN — Medication 1 MILLIGRAM(S): at 21:27

## 2024-05-26 RX ADMIN — GABAPENTIN 200 MILLIGRAM(S): 400 CAPSULE ORAL at 21:28

## 2024-05-26 RX ADMIN — TIOTROPIUM BROMIDE 2 PUFF(S): 18 CAPSULE ORAL; RESPIRATORY (INHALATION) at 11:58

## 2024-05-26 RX ADMIN — Medication 2: at 12:37

## 2024-05-26 RX ADMIN — GABAPENTIN 200 MILLIGRAM(S): 400 CAPSULE ORAL at 14:35

## 2024-05-26 RX ADMIN — Medication 3 MILLIGRAM(S): at 21:28

## 2024-05-26 RX ADMIN — Medication 3 MILLILITER(S): at 11:58

## 2024-05-26 RX ADMIN — Medication 975 MILLIGRAM(S): at 11:57

## 2024-05-26 RX ADMIN — Medication 975 MILLIGRAM(S): at 18:30

## 2024-05-26 RX ADMIN — Medication 975 MILLIGRAM(S): at 17:46

## 2024-05-26 RX ADMIN — Medication 25 MILLIGRAM(S): at 05:03

## 2024-05-26 RX ADMIN — Medication 975 MILLIGRAM(S): at 05:03

## 2024-05-26 RX ADMIN — Medication 3 MILLILITER(S): at 05:02

## 2024-05-26 RX ADMIN — Medication 100 MILLIGRAM(S): at 17:47

## 2024-05-26 RX ADMIN — Medication 3 MILLILITER(S): at 17:48

## 2024-05-26 NOTE — PROGRESS NOTE ADULT - SUBJECTIVE AND OBJECTIVE BOX
Vascular Surgery Daily Progress Note    SUBJECTIVE:  Pt seen and examined, and is resting comfortably in bed. Pain is adequately controlled on current regimen. Tearful, but no complaints at this time. Pt has been OOB.     OBJECTIVE:  Vital Signs Last 24 Hrs  T(C): 36.9 (26 May 2024 08:39), Max: 36.9 (26 May 2024 08:39)  T(F): 98.4 (26 May 2024 08:39), Max: 98.4 (26 May 2024 08:39)  HR: 58 (26 May 2024 08:39) (58 - 66)  BP: 138/70 (26 May 2024 08:39) (138/70 - 164/76)  BP(mean): --  RR: 18 (26 May 2024 08:39) (18 - 18)  SpO2: 95% (26 May 2024 08:39) (93% - 96%)    Parameters below as of 26 May 2024 08:39  Patient On (Oxygen Delivery Method): nasal cannula  O2 Flow (L/min): 2      I&O's Detail    25 May 2024 07:01  -  26 May 2024 07:00  --------------------------------------------------------  IN:    Oral Fluid: 300 mL  Total IN: 300 mL    OUT:    Voided (mL): 1500 mL  Total OUT: 1500 mL    Total NET: -1200 mL      26 May 2024 07:01  -  26 May 2024 09:04  --------------------------------------------------------  IN:  Total IN: 0 mL    OUT:    Voided (mL): 500 mL  Total OUT: 500 mL    Total NET: -500 mL        Exam:  GEN: Tearful, NAD  HEENT: atraumatic, normocephalic  CV: pulse present regularly  RESP: no increased work of breathing, no use of accessory muscles  GI/ABD: soft, nontender, nondistended  EXTREMITIES: Right AKA ACE dressing c/d/i. Warm to touch, soft. No cyanosis/erythema/edema/hematoma.                        11.3   14.04 )-----------( 260      ( 26 May 2024 06:35 )             39.1       05-26    143  |  107  |  71<H>  ----------------------------<  85  4.6   |  24  |  1.81<H>    Ca    9.1      26 May 2024 06:34  Phos  3.5     05-26  Mg     2.1     05-26

## 2024-05-26 NOTE — PROGRESS NOTE ADULT - SUBJECTIVE AND OBJECTIVE BOX
Subjective: Patient seen and examined. No new events except as noted.     SUBJECTIVE/ROS:  nad      MEDICATIONS:  MEDICATIONS  (STANDING):  acetaminophen     Tablet .. 975 milliGRAM(s) Oral every 6 hours  albuterol/ipratropium for Nebulization 3 milliLiter(s) Nebulizer every 6 hours  aMIOdarone    Tablet 200 milliGRAM(s) Oral daily  amLODIPine   Tablet 5 milliGRAM(s) Oral daily  anagrelide 1 milliGRAM(s) Oral <User Schedule>  apixaban 2.5 milliGRAM(s) Oral every 12 hours  atorvastatin 40 milliGRAM(s) Oral at bedtime  budesonide 160 MICROgram(s)/formoterol 4.5 MICROgram(s) Inhaler 2 Puff(s) Inhalation two times a day  doxycycline monohydrate Capsule 100 milliGRAM(s) Oral every 12 hours  gabapentin 200 milliGRAM(s) Oral three times a day  insulin lispro (ADMELOG) corrective regimen sliding scale   SubCutaneous three times a day before meals  insulin lispro (ADMELOG) corrective regimen sliding scale   SubCutaneous at bedtime  melatonin 3 milliGRAM(s) Oral at bedtime  metoprolol succinate ER 25 milliGRAM(s) Oral daily  pantoprazole    Tablet 40 milliGRAM(s) Oral before breakfast  polyethylene glycol 3350 17 Gram(s) Oral every 12 hours  predniSONE   Tablet 50 milliGRAM(s) Oral daily  senna 2 Tablet(s) Oral at bedtime  tiotropium 2.5 MICROgram(s) Inhaler 2 Puff(s) Inhalation daily  torsemide 20 milliGRAM(s) Oral daily      PHYSICAL EXAM:  T(C): 36.6 (05-26-24 @ 05:33), Max: 36.7 (05-25-24 @ 08:25)  HR: 64 (05-26-24 @ 05:33) (59 - 66)  BP: 164/76 (05-26-24 @ 05:33) (146/67 - 164/76)  RR: 18 (05-26-24 @ 05:33) (18 - 18)  SpO2: 95% (05-26-24 @ 05:33) (93% - 97%)  Wt(kg): --  I&O's Summary    25 May 2024 07:01  -  26 May 2024 07:00  --------------------------------------------------------  IN: 300 mL / OUT: 1500 mL / NET: -1200 mL            JVP: Normal  Neck: supple  Lung: clear   CV: S1 S2 , Murmur:  Abd: soft  Ext: No edema  neuro: Awake / alert  Psych: flat affect  Skin: normal``    LABS/DATA:    CARDIAC MARKERS:                                11.3   14.04 )-----------( 260      ( 26 May 2024 06:35 )             39.1     05-26    143  |  107  |  71<H>  ----------------------------<  85  4.6   |  24  |  1.81<H>    Ca    9.1      26 May 2024 06:34  Phos  3.5     05-26  Mg     2.1     05-26      proBNP:   Lipid Profile:   HgA1c:   TSH:     TELE:  EKG:

## 2024-05-26 NOTE — PROGRESS NOTE ADULT - ASSESSMENT
PAD  s/p AKA  fu with vascular surgery   pain control    CAD  s/p PCI   recent cath jan 2024 with patent stent  on a/c and anagrelide   off asa, plavix , high bleed risk   on statin   on BB    PAF  on amio  avoid qt prolonging drugs  on Eliquis      0

## 2024-05-26 NOTE — PROGRESS NOTE ADULT - ASSESSMENT
83 Female h/o CAD (1 stent placed), A-fib on Eliquis, COPD, HTN, HLD, PAD s/p  right ilio-profunda and jump femoro-above knee popliteal bypass in summer 2018, brain aneurysm p/w 3d of RLE pain and numbness concerning for Zac 2 acute limb ischemia. Right AKA cancelled on 5/20 due to desaturation, and was subsequently transferred to SICU 5/21 after another episode of desaturation. Now s/p R AKA (5/22).    PLAN:   - Oxycodone 10mg d/marco for severe pain; started Dilaudid 2mg q4h for severe pain  - Dressing change to R AKA stump starting today 5/25 with xeroform, gauze, izabella, ACE  - Abx: PO Doxycycline 5/22-5/27  - Diet: Reg/DASH diet  - Home meds as appropriate  - Dispo: Acute rehab    Vascular Surgery  [phone]

## 2024-05-26 NOTE — PROGRESS NOTE ADULT - SUBJECTIVE AND OBJECTIVE BOX
Name of Patient : ANGIE LOPES  MRN: [id number]      Subjective: Patient seen and examined. No new events except as noted.     REVIEW OF SYSTEMS:    CONSTITUTIONAL: No weakness, fevers or chills  EYES/ENT: No visual changes;  No vertigo or throat pain   NECK: No pain or stiffness  RESPIRATORY: No cough, wheezing, hemoptysis; No shortness of breath  CARDIOVASCULAR: No chest pain or palpitations  GASTROINTESTINAL: No abdominal or epigastric pain. No nausea, vomiting, or hematemesis; No diarrhea or constipation. No melena or hematochezia.  GENITOURINARY: No dysuria, frequency or hematuria  NEUROLOGICAL: No numbness or weakness  SKIN: No itching, burning, rashes, or lesions   All other review of systems is negative unless indicated above.    MEDICATIONS:  MEDICATIONS  (STANDING):  acetaminophen     Tablet .. 975 milliGRAM(s) Oral every 6 hours  albuterol/ipratropium for Nebulization 3 milliLiter(s) Nebulizer every 6 hours  aMIOdarone    Tablet 200 milliGRAM(s) Oral daily  amLODIPine   Tablet 5 milliGRAM(s) Oral daily  anagrelide 1 milliGRAM(s) Oral <User Schedule>  apixaban 2.5 milliGRAM(s) Oral every 12 hours  atorvastatin 40 milliGRAM(s) Oral at bedtime  budesonide 160 MICROgram(s)/formoterol 4.5 MICROgram(s) Inhaler 2 Puff(s) Inhalation two times a day  doxycycline monohydrate Capsule 100 milliGRAM(s) Oral every 12 hours  gabapentin 200 milliGRAM(s) Oral three times a day  insulin lispro (ADMELOG) corrective regimen sliding scale   SubCutaneous three times a day before meals  insulin lispro (ADMELOG) corrective regimen sliding scale   SubCutaneous at bedtime  melatonin 3 milliGRAM(s) Oral at bedtime  metoprolol succinate ER 25 milliGRAM(s) Oral daily  pantoprazole    Tablet 40 milliGRAM(s) Oral before breakfast  polyethylene glycol 3350 17 Gram(s) Oral every 12 hours  predniSONE   Tablet 50 milliGRAM(s) Oral daily  senna 2 Tablet(s) Oral at bedtime  tiotropium 2.5 MICROgram(s) Inhaler 2 Puff(s) Inhalation daily  torsemide 20 milliGRAM(s) Oral daily      PHYSICAL EXAM:  T(C): 36.5 (05-26-24 @ 21:01), Max: 36.9 (05-26-24 @ 08:39)  HR: 60 (05-26-24 @ 21:01) (57 - 64)  BP: 168/75 (05-26-24 @ 21:01) (119/66 - 168/75)  RR: 18 (05-26-24 @ 21:01) (18 - 18)  SpO2: 95% (05-26-24 @ 21:01) (95% - 99%)  Wt(kg): --  I&O's Summary    25 May 2024 07:01  -  26 May 2024 07:00  --------------------------------------------------------  IN: 300 mL / OUT: 1500 mL / NET: -1200 mL    26 May 2024 07:01  -  27 May 2024 00:30  --------------------------------------------------------  IN: 480 mL / OUT: 1500 mL / NET: -1020 mL          Appearance: Normal	  HEENT:  PERRLA   Lymphatic: No lymphadenopathy   Cardiovascular: Normal S1 S2, no JVD  Respiratory: normal effort , clear  Gastrointestinal:  Soft, Non-tender  Skin: No rashes,  warm to touch  Psychiatry:  Mood & affect appropriate  Musculuskeletal: No edema    recent labs, Imaging and EKGs personally reviewed             05-25-24 @ 07:01  -  05-26-24 @ 07:00  --------------------------------------------------------  IN: 300 mL / OUT: 1500 mL / NET: -1200 mL    05-26-24 @ 07:01  -  05-27-24 @ 00:30  --------------------------------------------------------  IN: 480 mL / OUT: 1500 mL / NET: -1020 mL

## 2024-05-27 LAB
ANION GAP SERPL CALC-SCNC: 14 MMOL/L — SIGNIFICANT CHANGE UP (ref 5–17)
BUN SERPL-MCNC: 79 MG/DL — HIGH (ref 7–23)
CALCIUM SERPL-MCNC: 9.4 MG/DL — SIGNIFICANT CHANGE UP (ref 8.4–10.5)
CHLORIDE SERPL-SCNC: 106 MMOL/L — SIGNIFICANT CHANGE UP (ref 96–108)
CO2 SERPL-SCNC: 24 MMOL/L — SIGNIFICANT CHANGE UP (ref 22–31)
CREAT SERPL-MCNC: 1.75 MG/DL — HIGH (ref 0.5–1.3)
EGFR: 29 ML/MIN/1.73M2 — LOW
GLUCOSE BLDC GLUCOMTR-MCNC: 113 MG/DL — HIGH (ref 70–99)
GLUCOSE BLDC GLUCOMTR-MCNC: 138 MG/DL — HIGH (ref 70–99)
GLUCOSE BLDC GLUCOMTR-MCNC: 160 MG/DL — HIGH (ref 70–99)
GLUCOSE BLDC GLUCOMTR-MCNC: 180 MG/DL — HIGH (ref 70–99)
GLUCOSE SERPL-MCNC: 78 MG/DL — SIGNIFICANT CHANGE UP (ref 70–99)
HCT VFR BLD CALC: 40 % — SIGNIFICANT CHANGE UP (ref 34.5–45)
HGB BLD-MCNC: 11.8 G/DL — SIGNIFICANT CHANGE UP (ref 11.5–15.5)
MAGNESIUM SERPL-MCNC: 2.2 MG/DL — SIGNIFICANT CHANGE UP (ref 1.6–2.6)
MCHC RBC-ENTMCNC: 23.2 PG — LOW (ref 27–34)
MCHC RBC-ENTMCNC: 29.5 GM/DL — LOW (ref 32–36)
MCV RBC AUTO: 78.6 FL — LOW (ref 80–100)
NRBC # BLD: 0 /100 WBCS — SIGNIFICANT CHANGE UP (ref 0–0)
PHOSPHATE SERPL-MCNC: 3.2 MG/DL — SIGNIFICANT CHANGE UP (ref 2.5–4.5)
PLATELET # BLD AUTO: 284 K/UL — SIGNIFICANT CHANGE UP (ref 150–400)
POTASSIUM SERPL-MCNC: 4.8 MMOL/L — SIGNIFICANT CHANGE UP (ref 3.5–5.3)
POTASSIUM SERPL-SCNC: 4.8 MMOL/L — SIGNIFICANT CHANGE UP (ref 3.5–5.3)
RBC # BLD: 5.09 M/UL — SIGNIFICANT CHANGE UP (ref 3.8–5.2)
RBC # FLD: 21.2 % — HIGH (ref 10.3–14.5)
SODIUM SERPL-SCNC: 144 MMOL/L — SIGNIFICANT CHANGE UP (ref 135–145)
WBC # BLD: 13.54 K/UL — HIGH (ref 3.8–10.5)
WBC # FLD AUTO: 13.54 K/UL — HIGH (ref 3.8–10.5)

## 2024-05-27 RX ADMIN — Medication 975 MILLIGRAM(S): at 12:59

## 2024-05-27 RX ADMIN — APIXABAN 2.5 MILLIGRAM(S): 2.5 TABLET, FILM COATED ORAL at 17:29

## 2024-05-27 RX ADMIN — GABAPENTIN 200 MILLIGRAM(S): 400 CAPSULE ORAL at 21:30

## 2024-05-27 RX ADMIN — BUDESONIDE AND FORMOTEROL FUMARATE DIHYDRATE 2 PUFF(S): 160; 4.5 AEROSOL RESPIRATORY (INHALATION) at 17:30

## 2024-05-27 RX ADMIN — Medication 3 MILLILITER(S): at 06:28

## 2024-05-27 RX ADMIN — Medication 100 MILLIGRAM(S): at 17:29

## 2024-05-27 RX ADMIN — Medication 1 MILLIGRAM(S): at 09:54

## 2024-05-27 RX ADMIN — Medication 20 MILLIGRAM(S): at 06:27

## 2024-05-27 RX ADMIN — GABAPENTIN 200 MILLIGRAM(S): 400 CAPSULE ORAL at 14:27

## 2024-05-27 RX ADMIN — Medication 50 MILLIGRAM(S): at 06:27

## 2024-05-27 RX ADMIN — GABAPENTIN 200 MILLIGRAM(S): 400 CAPSULE ORAL at 06:27

## 2024-05-27 RX ADMIN — Medication 100 MILLIGRAM(S): at 06:26

## 2024-05-27 RX ADMIN — Medication 3 MILLILITER(S): at 13:15

## 2024-05-27 RX ADMIN — ATORVASTATIN CALCIUM 40 MILLIGRAM(S): 80 TABLET, FILM COATED ORAL at 21:30

## 2024-05-27 RX ADMIN — Medication 975 MILLIGRAM(S): at 06:26

## 2024-05-27 RX ADMIN — TIOTROPIUM BROMIDE 2 PUFF(S): 18 CAPSULE ORAL; RESPIRATORY (INHALATION) at 12:13

## 2024-05-27 RX ADMIN — AMIODARONE HYDROCHLORIDE 200 MILLIGRAM(S): 400 TABLET ORAL at 06:27

## 2024-05-27 RX ADMIN — APIXABAN 2.5 MILLIGRAM(S): 2.5 TABLET, FILM COATED ORAL at 06:27

## 2024-05-27 RX ADMIN — Medication 2: at 12:14

## 2024-05-27 RX ADMIN — Medication 975 MILLIGRAM(S): at 12:12

## 2024-05-27 RX ADMIN — Medication 975 MILLIGRAM(S): at 17:29

## 2024-05-27 RX ADMIN — Medication 3 MILLIGRAM(S): at 21:30

## 2024-05-27 RX ADMIN — Medication 3 MILLILITER(S): at 17:29

## 2024-05-27 RX ADMIN — Medication 3 MILLILITER(S): at 23:14

## 2024-05-27 RX ADMIN — Medication 975 MILLIGRAM(S): at 23:44

## 2024-05-27 RX ADMIN — PANTOPRAZOLE SODIUM 40 MILLIGRAM(S): 20 TABLET, DELAYED RELEASE ORAL at 06:26

## 2024-05-27 RX ADMIN — Medication 1 MILLIGRAM(S): at 21:29

## 2024-05-27 RX ADMIN — Medication 25 MILLIGRAM(S): at 06:27

## 2024-05-27 RX ADMIN — AMLODIPINE BESYLATE 5 MILLIGRAM(S): 2.5 TABLET ORAL at 06:27

## 2024-05-27 RX ADMIN — Medication 975 MILLIGRAM(S): at 23:14

## 2024-05-27 RX ADMIN — BUDESONIDE AND FORMOTEROL FUMARATE DIHYDRATE 2 PUFF(S): 160; 4.5 AEROSOL RESPIRATORY (INHALATION) at 06:28

## 2024-05-27 RX ADMIN — Medication 975 MILLIGRAM(S): at 06:56

## 2024-05-27 NOTE — PROGRESS NOTE ADULT - SUBJECTIVE AND OBJECTIVE BOX
A Team (General Surgery) Daily Progress Note    SUBJECTIVE:  Pt seen and examined, and is resting comfortably in bed. No acute events overnight. Pain is adequately controlled on current regimen. Pt has no complaints at this time.     OBJECTIVE:  Vital Signs Last 24 Hrs  T(C): 36.5 (27 May 2024 12:25), Max: 36.6 (27 May 2024 00:20)  T(F): 97.7 (27 May 2024 12:25), Max: 97.9 (27 May 2024 08:49)  HR: 66 (27 May 2024 12:25) (57 - 66)  BP: 134/66 (27 May 2024 12:25) (134/66 - 168/75)  BP(mean): --  RR: 18 (27 May 2024 12:25) (18 - 18)  SpO2: 95% (27 May 2024 12:25) (92% - 96%)    Parameters below as of 27 May 2024 12:25  Patient On (Oxygen Delivery Method): room air        I&O's Detail    26 May 2024 07:01  -  27 May 2024 07:00  --------------------------------------------------------  IN:    Oral Fluid: 880 mL  Total IN: 880 mL    OUT:    Voided (mL): 2100 mL  Total OUT: 2100 mL    Total NET: -1220 mL      27 May 2024 07:01  -  27 May 2024 14:31  --------------------------------------------------------  IN:    Oral Fluid: 400 mL  Total IN: 400 mL    OUT:    Voided (mL): 850 mL  Total OUT: 850 mL    Total NET: -450 mL        Exam:  GEN: NAD  HEENT: atraumatic, normocephalic  CV: pulse present regularly  RESP: no increased work of breathing, no use of accessory muscles  GI/ABD: soft, nontender, nondistended  EXTREMITIES: Right AKA ACE dressing c/d/i. Warm to touch, soft. No cyanosis/erythema/edema/hematoma.                        11.8   13.54 )-----------( 284      ( 27 May 2024 07:43 )             40.0       05-27    144  |  106  |  79<H>  ----------------------------<  78  4.8   |  24  |  1.75<H>    Ca    9.4      27 May 2024 07:42  Phos  3.2     05-27  Mg     2.2     05-27

## 2024-05-27 NOTE — PROGRESS NOTE ADULT - SUBJECTIVE AND OBJECTIVE BOX
Name of Patient : ANGIE LOPES  MRN: [id number]  Date of visit: 05-27-24       Subjective: Patient seen and examined. No new events except as noted.     REVIEW OF SYSTEMS:    CONSTITUTIONAL: No weakness, fevers or chills  EYES/ENT: No visual changes;  No vertigo or throat pain   NECK: No pain or stiffness  RESPIRATORY: No cough, wheezing, hemoptysis; No shortness of breath  CARDIOVASCULAR: No chest pain or palpitations  GASTROINTESTINAL: No abdominal or epigastric pain. No nausea, vomiting, or hematemesis; No diarrhea or constipation. No melena or hematochezia.  GENITOURINARY: No dysuria, frequency or hematuria  NEUROLOGICAL: No numbness or weakness  SKIN: No itching, burning, rashes, or lesions   All other review of systems is negative unless indicated above.    MEDICATIONS:  MEDICATIONS  (STANDING):  acetaminophen     Tablet .. 975 milliGRAM(s) Oral every 6 hours  albuterol/ipratropium for Nebulization 3 milliLiter(s) Nebulizer every 6 hours  aMIOdarone    Tablet 200 milliGRAM(s) Oral daily  amLODIPine   Tablet 5 milliGRAM(s) Oral daily  anagrelide 1 milliGRAM(s) Oral <User Schedule>  apixaban 2.5 milliGRAM(s) Oral every 12 hours  atorvastatin 40 milliGRAM(s) Oral at bedtime  budesonide 160 MICROgram(s)/formoterol 4.5 MICROgram(s) Inhaler 2 Puff(s) Inhalation two times a day  gabapentin 200 milliGRAM(s) Oral three times a day  insulin lispro (ADMELOG) corrective regimen sliding scale   SubCutaneous three times a day before meals  insulin lispro (ADMELOG) corrective regimen sliding scale   SubCutaneous at bedtime  melatonin 3 milliGRAM(s) Oral at bedtime  metoprolol succinate ER 25 milliGRAM(s) Oral daily  pantoprazole    Tablet 40 milliGRAM(s) Oral before breakfast  polyethylene glycol 3350 17 Gram(s) Oral every 12 hours  senna 2 Tablet(s) Oral at bedtime  tiotropium 2.5 MICROgram(s) Inhaler 2 Puff(s) Inhalation daily  torsemide 20 milliGRAM(s) Oral daily      PHYSICAL EXAM:  T(C): 36.6 (05-27-24 @ 22:42), Max: 36.6 (05-27-24 @ 00:20)  HR: 63 (05-27-24 @ 22:42) (60 - 67)  BP: 178/70 (05-27-24 @ 22:42) (134/66 - 184/80)  RR: 18 (05-27-24 @ 22:42) (18 - 18)  SpO2: 98% (05-27-24 @ 22:42) (92% - 98%)  Wt(kg): --  I&O's Summary    26 May 2024 07:01  -  27 May 2024 07:00  --------------------------------------------------------  IN: 880 mL / OUT: 2100 mL / NET: -1220 mL    27 May 2024 07:01  -  27 May 2024 22:53  --------------------------------------------------------  IN: 400 mL / OUT: 850 mL / NET: -450 mL          Appearance: Normal	  HEENT:  PERRLA   Lymphatic: No lymphadenopathy   Cardiovascular: Normal S1 S2, no JVD  Respiratory: normal effort , clear  Gastrointestinal:  Soft, Non-tender  Skin: No rashes,  warm to touch  Psychiatry:  Mood & affect appropriate  Musculuskeletal: No edema    recent labs, Imaging and EKGs personally reviewed     05-26-24 @ 07:01  -  05-27-24 @ 07:00  --------------------------------------------------------  IN: 880 mL / OUT: 2100 mL / NET: -1220 mL    05-27-24 @ 07:01  -  05-27-24 @ 22:53  --------------------------------------------------------  IN: 400 mL / OUT: 850 mL / NET: -450 mL                          11.8   13.54 )-----------( 284      ( 27 May 2024 07:43 )             40.0               05-27    144  |  106  |  79<H>  ----------------------------<  78  4.8   |  24  |  1.75<H>    Ca    9.4      27 May 2024 07:42  Phos  3.2     05-27  Mg     2.2     05-27                         Urinalysis Basic - ( 27 May 2024 07:42 )    Color: x / Appearance: x / SG: x / pH: x  Gluc: 78 mg/dL / Ketone: x  / Bili: x / Urobili: x   Blood: x / Protein: x / Nitrite: x   Leuk Esterase: x / RBC: x / WBC x   Sq Epi: x / Non Sq Epi: x / Bacteria: x

## 2024-05-27 NOTE — PROGRESS NOTE ADULT - ASSESSMENT
PAD  s/p AKA  fu with vascular surgery   pain control    CAD  s/p PCI   recent cath jan 2024 with patent stent  on a/c and anagrelide   off asa, plavix , high bleed risk   on statin   on BB    PAF  on amio  avoid qt prolonging drugs  on Eliquis

## 2024-05-27 NOTE — PROGRESS NOTE ADULT - ASSESSMENT
83 Female h/o CAD (1 stent placed), A-fib on Eliquis, COPD, HTN, HLD, PAD s/p  right ilio-profunda and jump femoro-above knee popliteal bypass in summer 2018, brain aneurysm p/w 3d of RLE pain and numbness concerning for Zac 2 acute limb ischemia. Right AKA cancelled on 5/20 due to desaturation, and was subsequently transferred to SICU 5/21 after another episode of desaturation. Now s/p R AKA (5/22).    PLAN:   - Pain control; Oxycodone 5mg for severe pain  - Dressing change to R AKA stump starting today 5/25 with izabella ACE  - Abx: PO Doxycycline 5/22-5/27  - Diet: Reg/DASH diet  - Home meds as appropriate  - Trend Cr FeNleatha 2.1; will workup and monitor outpatient  - Dispo: Acute rehab    Vascular Surgery  [phone]

## 2024-05-27 NOTE — PROGRESS NOTE ADULT - SUBJECTIVE AND OBJECTIVE BOX
Subjective: Patient seen and examined. No new events except as noted.     SUBJECTIVE/ROS:  nad      MEDICATIONS:  MEDICATIONS  (STANDING):  acetaminophen     Tablet .. 975 milliGRAM(s) Oral every 6 hours  albuterol/ipratropium for Nebulization 3 milliLiter(s) Nebulizer every 6 hours  aMIOdarone    Tablet 200 milliGRAM(s) Oral daily  amLODIPine   Tablet 5 milliGRAM(s) Oral daily  anagrelide 1 milliGRAM(s) Oral <User Schedule>  apixaban 2.5 milliGRAM(s) Oral every 12 hours  atorvastatin 40 milliGRAM(s) Oral at bedtime  budesonide 160 MICROgram(s)/formoterol 4.5 MICROgram(s) Inhaler 2 Puff(s) Inhalation two times a day  doxycycline monohydrate Capsule 100 milliGRAM(s) Oral every 12 hours  gabapentin 200 milliGRAM(s) Oral three times a day  insulin lispro (ADMELOG) corrective regimen sliding scale   SubCutaneous three times a day before meals  insulin lispro (ADMELOG) corrective regimen sliding scale   SubCutaneous at bedtime  melatonin 3 milliGRAM(s) Oral at bedtime  metoprolol succinate ER 25 milliGRAM(s) Oral daily  pantoprazole    Tablet 40 milliGRAM(s) Oral before breakfast  polyethylene glycol 3350 17 Gram(s) Oral every 12 hours  senna 2 Tablet(s) Oral at bedtime  tiotropium 2.5 MICROgram(s) Inhaler 2 Puff(s) Inhalation daily  torsemide 20 milliGRAM(s) Oral daily      PHYSICAL EXAM:  T(C): 36.5 (05-27-24 @ 12:25), Max: 36.6 (05-27-24 @ 00:20)  HR: 66 (05-27-24 @ 12:25) (60 - 66)  BP: 134/66 (05-27-24 @ 12:25) (134/66 - 168/75)  RR: 18 (05-27-24 @ 12:25) (18 - 18)  SpO2: 95% (05-27-24 @ 12:25) (92% - 95%)  Wt(kg): --  I&O's Summary    26 May 2024 07:01  -  27 May 2024 07:00  --------------------------------------------------------  IN: 880 mL / OUT: 2100 mL / NET: -1220 mL    27 May 2024 07:01  -  27 May 2024 17:13  --------------------------------------------------------  IN: 400 mL / OUT: 850 mL / NET: -450 mL            JVP: Normal  Neck: supple  Lung: clear   CV: S1 S2 , Murmur:  Abd: soft  Ext: No edema  neuro: Awake / alert  Psych: flat affect  Skin: normal``    LABS/DATA:    CARDIAC MARKERS:                                11.8   13.54 )-----------( 284      ( 27 May 2024 07:43 )             40.0     05-27    144  |  106  |  79<H>  ----------------------------<  78  4.8   |  24  |  1.75<H>    Ca    9.4      27 May 2024 07:42  Phos  3.2     05-27  Mg     2.2     05-27      proBNP:   Lipid Profile:   HgA1c:   TSH:     TELE:  EKG:

## 2024-05-28 ENCOUNTER — TRANSCRIPTION ENCOUNTER (OUTPATIENT)
Age: 84
End: 2024-05-28

## 2024-05-28 VITALS
DIASTOLIC BLOOD PRESSURE: 65 MMHG | TEMPERATURE: 98 F | SYSTOLIC BLOOD PRESSURE: 148 MMHG | OXYGEN SATURATION: 92 % | HEART RATE: 59 BPM | RESPIRATION RATE: 18 BRPM

## 2024-05-28 LAB
ANION GAP SERPL CALC-SCNC: 15 MMOL/L — SIGNIFICANT CHANGE UP (ref 5–17)
BUN SERPL-MCNC: 76 MG/DL — HIGH (ref 7–23)
CALCIUM SERPL-MCNC: 9.2 MG/DL — SIGNIFICANT CHANGE UP (ref 8.4–10.5)
CHLORIDE SERPL-SCNC: 104 MMOL/L — SIGNIFICANT CHANGE UP (ref 96–108)
CO2 SERPL-SCNC: 23 MMOL/L — SIGNIFICANT CHANGE UP (ref 22–31)
CREAT SERPL-MCNC: 1.52 MG/DL — HIGH (ref 0.5–1.3)
EGFR: 34 ML/MIN/1.73M2 — LOW
GLUCOSE BLDC GLUCOMTR-MCNC: 116 MG/DL — HIGH (ref 70–99)
GLUCOSE BLDC GLUCOMTR-MCNC: 147 MG/DL — HIGH (ref 70–99)
GLUCOSE SERPL-MCNC: 77 MG/DL — SIGNIFICANT CHANGE UP (ref 70–99)
HCT VFR BLD CALC: 46.2 % — HIGH (ref 34.5–45)
HGB BLD-MCNC: 13.5 G/DL — SIGNIFICANT CHANGE UP (ref 11.5–15.5)
MAGNESIUM SERPL-MCNC: 2.2 MG/DL — SIGNIFICANT CHANGE UP (ref 1.6–2.6)
MCHC RBC-ENTMCNC: 23.2 PG — LOW (ref 27–34)
MCHC RBC-ENTMCNC: 29.2 GM/DL — LOW (ref 32–36)
MCV RBC AUTO: 79.5 FL — LOW (ref 80–100)
NRBC # BLD: 0 /100 WBCS — SIGNIFICANT CHANGE UP (ref 0–0)
PHOSPHATE SERPL-MCNC: 3.1 MG/DL — SIGNIFICANT CHANGE UP (ref 2.5–4.5)
PLATELET # BLD AUTO: 355 K/UL — SIGNIFICANT CHANGE UP (ref 150–400)
POTASSIUM SERPL-MCNC: 4.8 MMOL/L — SIGNIFICANT CHANGE UP (ref 3.5–5.3)
POTASSIUM SERPL-SCNC: 4.8 MMOL/L — SIGNIFICANT CHANGE UP (ref 3.5–5.3)
RBC # BLD: 5.81 M/UL — HIGH (ref 3.8–5.2)
RBC # FLD: 22.1 % — HIGH (ref 10.3–14.5)
SODIUM SERPL-SCNC: 142 MMOL/L — SIGNIFICANT CHANGE UP (ref 135–145)
WBC # BLD: 12.8 K/UL — HIGH (ref 3.8–10.5)
WBC # FLD AUTO: 12.8 K/UL — HIGH (ref 3.8–10.5)

## 2024-05-28 PROCEDURE — 84100 ASSAY OF PHOSPHORUS: CPT

## 2024-05-28 PROCEDURE — 84484 ASSAY OF TROPONIN QUANT: CPT

## 2024-05-28 PROCEDURE — 86901 BLOOD TYPING SEROLOGIC RH(D): CPT

## 2024-05-28 PROCEDURE — 88311 DECALCIFY TISSUE: CPT

## 2024-05-28 PROCEDURE — 85027 COMPLETE CBC AUTOMATED: CPT

## 2024-05-28 PROCEDURE — 93005 ELECTROCARDIOGRAM TRACING: CPT

## 2024-05-28 PROCEDURE — 84132 ASSAY OF SERUM POTASSIUM: CPT

## 2024-05-28 PROCEDURE — 97116 GAIT TRAINING THERAPY: CPT

## 2024-05-28 PROCEDURE — 94640 AIRWAY INHALATION TREATMENT: CPT

## 2024-05-28 PROCEDURE — 82947 ASSAY GLUCOSE BLOOD QUANT: CPT

## 2024-05-28 PROCEDURE — 85018 HEMOGLOBIN: CPT

## 2024-05-28 PROCEDURE — 36415 COLL VENOUS BLD VENIPUNCTURE: CPT

## 2024-05-28 PROCEDURE — 84300 ASSAY OF URINE SODIUM: CPT

## 2024-05-28 PROCEDURE — 82435 ASSAY OF BLOOD CHLORIDE: CPT

## 2024-05-28 PROCEDURE — 80048 BASIC METABOLIC PNL TOTAL CA: CPT

## 2024-05-28 PROCEDURE — 81001 URINALYSIS AUTO W/SCOPE: CPT

## 2024-05-28 PROCEDURE — 82803 BLOOD GASES ANY COMBINATION: CPT

## 2024-05-28 PROCEDURE — 96374 THER/PROPH/DIAG INJ IV PUSH: CPT

## 2024-05-28 PROCEDURE — 85610 PROTHROMBIN TIME: CPT

## 2024-05-28 PROCEDURE — 97165 OT EVAL LOW COMPLEX 30 MIN: CPT

## 2024-05-28 PROCEDURE — 87040 BLOOD CULTURE FOR BACTERIA: CPT

## 2024-05-28 PROCEDURE — 87637 SARSCOV2&INF A&B&RSV AMP PRB: CPT

## 2024-05-28 PROCEDURE — C9399: CPT

## 2024-05-28 PROCEDURE — 93306 TTE W/DOPPLER COMPLETE: CPT

## 2024-05-28 PROCEDURE — 75635 CT ANGIO ABDOMINAL ARTERIES: CPT | Mod: MC

## 2024-05-28 PROCEDURE — 82550 ASSAY OF CK (CPK): CPT

## 2024-05-28 PROCEDURE — 85025 COMPLETE CBC W/AUTO DIFF WBC: CPT

## 2024-05-28 PROCEDURE — 97110 THERAPEUTIC EXERCISES: CPT

## 2024-05-28 PROCEDURE — 83605 ASSAY OF LACTIC ACID: CPT

## 2024-05-28 PROCEDURE — 71045 X-RAY EXAM CHEST 1 VIEW: CPT

## 2024-05-28 PROCEDURE — 93356 MYOCRD STRAIN IMG SPCKL TRCK: CPT

## 2024-05-28 PROCEDURE — 71250 CT THORAX DX C-: CPT | Mod: MC

## 2024-05-28 PROCEDURE — 85730 THROMBOPLASTIN TIME PARTIAL: CPT

## 2024-05-28 PROCEDURE — 97162 PT EVAL MOD COMPLEX 30 MIN: CPT

## 2024-05-28 PROCEDURE — 83735 ASSAY OF MAGNESIUM: CPT

## 2024-05-28 PROCEDURE — 83935 ASSAY OF URINE OSMOLALITY: CPT

## 2024-05-28 PROCEDURE — 80053 COMPREHEN METABOLIC PANEL: CPT

## 2024-05-28 PROCEDURE — 82962 GLUCOSE BLOOD TEST: CPT

## 2024-05-28 PROCEDURE — 97535 SELF CARE MNGMENT TRAINING: CPT

## 2024-05-28 PROCEDURE — 88307 TISSUE EXAM BY PATHOLOGIST: CPT

## 2024-05-28 PROCEDURE — 84295 ASSAY OF SERUM SODIUM: CPT

## 2024-05-28 PROCEDURE — 85014 HEMATOCRIT: CPT

## 2024-05-28 PROCEDURE — 86850 RBC ANTIBODY SCREEN: CPT

## 2024-05-28 PROCEDURE — 93308 TTE F-UP OR LMTD: CPT

## 2024-05-28 PROCEDURE — 99285 EMERGENCY DEPT VISIT HI MDM: CPT | Mod: 25

## 2024-05-28 PROCEDURE — 82570 ASSAY OF URINE CREATININE: CPT

## 2024-05-28 PROCEDURE — 82330 ASSAY OF CALCIUM: CPT

## 2024-05-28 PROCEDURE — 86900 BLOOD TYPING SEROLOGIC ABO: CPT

## 2024-05-28 PROCEDURE — 83880 ASSAY OF NATRIURETIC PEPTIDE: CPT

## 2024-05-28 RX ORDER — OXYCODONE HYDROCHLORIDE 5 MG/1
1 TABLET ORAL
Qty: 0 | Refills: 0 | DISCHARGE
Start: 2024-05-28

## 2024-05-28 RX ORDER — PANTOPRAZOLE SODIUM 20 MG/1
1 TABLET, DELAYED RELEASE ORAL
Qty: 0 | Refills: 0 | DISCHARGE
Start: 2024-05-28

## 2024-05-28 RX ORDER — ACETAMINOPHEN 500 MG
3 TABLET ORAL
Qty: 0 | Refills: 0 | DISCHARGE
Start: 2024-05-28

## 2024-05-28 RX ORDER — LANOLIN ALCOHOL/MO/W.PET/CERES
1 CREAM (GRAM) TOPICAL
Qty: 0 | Refills: 0 | DISCHARGE
Start: 2024-05-28

## 2024-05-28 RX ORDER — AMIODARONE HYDROCHLORIDE 400 MG/1
1 TABLET ORAL
Qty: 0 | Refills: 0 | DISCHARGE
Start: 2024-05-28

## 2024-05-28 RX ORDER — METOPROLOL TARTRATE 50 MG
1 TABLET ORAL
Qty: 0 | Refills: 0 | DISCHARGE
Start: 2024-05-28

## 2024-05-28 RX ORDER — GABAPENTIN 400 MG/1
300 CAPSULE ORAL THREE TIMES A DAY
Refills: 0 | Status: DISCONTINUED | OUTPATIENT
Start: 2024-05-28 | End: 2024-05-28

## 2024-05-28 RX ORDER — POLYETHYLENE GLYCOL 3350 17 G/17G
17 POWDER, FOR SOLUTION ORAL
Qty: 0 | Refills: 0 | DISCHARGE
Start: 2024-05-28

## 2024-05-28 RX ORDER — SENNA PLUS 8.6 MG/1
2 TABLET ORAL
Qty: 0 | Refills: 0 | DISCHARGE
Start: 2024-05-28

## 2024-05-28 RX ORDER — GABAPENTIN 400 MG/1
1 CAPSULE ORAL
Qty: 0 | Refills: 0 | DISCHARGE
Start: 2024-05-28

## 2024-05-28 RX ADMIN — Medication 975 MILLIGRAM(S): at 11:15

## 2024-05-28 RX ADMIN — GABAPENTIN 200 MILLIGRAM(S): 400 CAPSULE ORAL at 05:02

## 2024-05-28 RX ADMIN — AMLODIPINE BESYLATE 5 MILLIGRAM(S): 2.5 TABLET ORAL at 05:03

## 2024-05-28 RX ADMIN — AMIODARONE HYDROCHLORIDE 200 MILLIGRAM(S): 400 TABLET ORAL at 05:03

## 2024-05-28 RX ADMIN — Medication 975 MILLIGRAM(S): at 05:32

## 2024-05-28 RX ADMIN — Medication 975 MILLIGRAM(S): at 05:02

## 2024-05-28 RX ADMIN — Medication 975 MILLIGRAM(S): at 12:15

## 2024-05-28 RX ADMIN — GABAPENTIN 300 MILLIGRAM(S): 400 CAPSULE ORAL at 11:19

## 2024-05-28 RX ADMIN — Medication 25 MILLIGRAM(S): at 05:03

## 2024-05-28 RX ADMIN — APIXABAN 2.5 MILLIGRAM(S): 2.5 TABLET, FILM COATED ORAL at 05:03

## 2024-05-28 RX ADMIN — TIOTROPIUM BROMIDE 2 PUFF(S): 18 CAPSULE ORAL; RESPIRATORY (INHALATION) at 11:15

## 2024-05-28 RX ADMIN — Medication 1 MILLIGRAM(S): at 08:51

## 2024-05-28 RX ADMIN — PANTOPRAZOLE SODIUM 40 MILLIGRAM(S): 20 TABLET, DELAYED RELEASE ORAL at 05:03

## 2024-05-28 RX ADMIN — Medication 20 MILLIGRAM(S): at 05:03

## 2024-05-28 RX ADMIN — Medication 3 MILLILITER(S): at 05:01

## 2024-05-28 RX ADMIN — BUDESONIDE AND FORMOTEROL FUMARATE DIHYDRATE 2 PUFF(S): 160; 4.5 AEROSOL RESPIRATORY (INHALATION) at 05:01

## 2024-05-28 RX ADMIN — Medication 3 MILLILITER(S): at 11:15

## 2024-05-28 NOTE — DISCHARGE NOTE PROVIDER - NSDCCPTREATMENT_GEN_ALL_CORE_FT
PRINCIPAL PROCEDURE  Procedure: Above knee amputation  Findings and Treatment: WOUND CARE: Staples will be removed at follow up office visit.  daily dressing changes with ACE, gauze, izabella, xeroform  BATHING: Please do not submerge wound underwater. You may shower and/or sponge bathe.  ACTIVITY: No heavy lifting anything more than 10-15lbs or straining. Otherwise, you may return to your usual level of physical activity. If you are taking narcotic pain medication (such as Percocet), do NOT drive a car, operate machinery or make important decisions.  NOTIFY YOUR SURGEON IF: You have any bleeding that does not stop, any pus draining from your wound, any fever (over 100.4 F) or chills, persistent nausea/vomiting with inability to tolerate food or liquids, persistent diarrhea, or if your pain is not controlled on your discharge pain medications.  FOLLOW-UP:  1. Please call to make a follow-up appointment within one week of discharge   2. Please follow up with your primary care physician in one week regarding your hospitalization.

## 2024-05-28 NOTE — PROGRESS NOTE ADULT - ASSESSMENT
83 Female h/o CAD (1 stent placed), A-fib on Eliquis, COPD, HTN, HLD, PAD s/p  right ilio-profunda and jump femoro-above knee popliteal bypass in summer 2018, brain aneurysm p/w 3d of RLE pain and numbness concerning for Zac 2 acute limb ischemia. Right AKA cancelled on 5/20 due to desaturation, and was subsequently transferred to SICU 5/21 after another episode of desaturation. Now s/p R AKA (5/22).    PLAN:   - Pain control; Oxycodone 5mg for severe pain  - Dressing change to R AKA stump starting today 5/25 with izabella ACE  - Abx: PO Doxycycline 5/22-5/27  - Diet: Reg/DASH diet  - Home meds as appropriate  - Trend Cr FeNleatha 2.1; will workup and monitor outpatient  - Dispo: Acute rehab    Vascular Surgery  [phone]

## 2024-05-28 NOTE — DISCHARGE NOTE PROVIDER - NSDCMRMEDTOKEN_GEN_ALL_CORE_FT
acetaminophen 325 mg oral tablet: 3 tab(s) orally every 6 hours  Albuterol (Eqv-Ventolin HFA) 90 mcg/inh inhalation aerosol: 2 puff(s) inhaled 2 times a day as needed for  shortness of breath and/or wheezing  amiodarone 200 mg oral tablet: 1 tab(s) orally once a day  amLODIPine 5 mg oral tablet: 1 tab(s) orally once a day  anagrelide 0.5 mg oral capsule: 1 cap(s) orally 2 times a day  atorvastatin 40 mg oral tablet: 1 tab(s) orally once a day  Eliquis 2.5 mg oral tablet: 1 tab(s) orally 2 times a day  gabapentin 300 mg oral capsule: 1 cap(s) orally 3 times a day  melatonin 3 mg oral tablet: 1 tab(s) orally once a day (at bedtime)  metoprolol succinate 25 mg oral tablet, extended release: 1 tab(s) orally once a day  oxyCODONE 5 mg oral tablet: 1 tab(s) orally every 4 hours As needed Severe Pain (7 - 10)  pantoprazole 40 mg oral delayed release tablet: 1 tab(s) orally once a day (before a meal)  polyethylene glycol 3350 oral powder for reconstitution: 17 gram(s) orally every 12 hours  senna leaf extract oral tablet: 2 tab(s) orally once a day (at bedtime)  torsemide 20 mg oral tablet: 1 tab(s) orally once a day  umeclidinium-vilanterol 62.5 mcg-25 mcg/inh inhalation powder: 1 puff(s) inhaled once a day

## 2024-05-28 NOTE — PROVIDER CONTACT NOTE (OTHER) - BACKGROUND
83F h/o CAD (1 stent placed), COPD, HTN, HLD, PAD s/p  right ilio-profunda and jump femoro-above knee popliteal bypass in summer 2018, brain aneurysm p/w 3d of RLE pain, numbness.
See H&P
R JEROME
Pt scheduled for R AKA on 5/27
see H&P.

## 2024-05-28 NOTE — PROGRESS NOTE ADULT - PROBLEM SELECTOR PLAN 3
-Plans for AKA today. If intubation is required, suggest fast track extubation to bipap.   -Management per vascular.
-Plans for eventual AKA  -Management per vascular.
-S/p R AKA  -Management per vascular.
-S/p R AKA  -Management per vascular.

## 2024-05-28 NOTE — PROGRESS NOTE ADULT - PROVIDER SPECIALTY LIST ADULT
Cardiology
Internal Medicine
SICU
Vascular Surgery
Cardiology
Internal Medicine
Vascular Surgery
Cardiology
Internal Medicine
Internal Medicine
SICU
SICU
Vascular Surgery
Pulmonology
Vascular Surgery
Pulmonology

## 2024-05-28 NOTE — PROVIDER CONTACT NOTE (OTHER) - RECOMMENDATIONS
Notify Provider.
Notify provider for Zofran and provider to RN for early administration of PRN oxycodone 10 mg
Notify the provider
place on humidified o2, monitor for further signs of bleeding
Notify Provider.

## 2024-05-28 NOTE — PROGRESS NOTE ADULT - SUBJECTIVE AND OBJECTIVE BOX
Name of Patient : ANGIE LOPES  MRN: [id number]  Date of visit: 05-28-24      Subjective: Patient seen and examined. No new events except as noted.     REVIEW OF SYSTEMS:    CONSTITUTIONAL: No weakness, fevers or chills  EYES/ENT: No visual changes;  No vertigo or throat pain   NECK: No pain or stiffness  RESPIRATORY: No cough, wheezing, hemoptysis; No shortness of breath  CARDIOVASCULAR: No chest pain or palpitations  GASTROINTESTINAL: No abdominal or epigastric pain. No nausea, vomiting, or hematemesis; No diarrhea or constipation. No melena or hematochezia.  GENITOURINARY: No dysuria, frequency or hematuria  NEUROLOGICAL: No numbness or weakness  SKIN: No itching, burning, rashes, or lesions   All other review of systems is negative unless indicated above.    MEDICATIONS:  MEDICATIONS  (STANDING):  acetaminophen     Tablet .. 975 milliGRAM(s) Oral every 6 hours  albuterol/ipratropium for Nebulization 3 milliLiter(s) Nebulizer every 6 hours  aMIOdarone    Tablet 200 milliGRAM(s) Oral daily  amLODIPine   Tablet 5 milliGRAM(s) Oral daily  anagrelide 1 milliGRAM(s) Oral <User Schedule>  apixaban 2.5 milliGRAM(s) Oral every 12 hours  atorvastatin 40 milliGRAM(s) Oral at bedtime  budesonide 160 MICROgram(s)/formoterol 4.5 MICROgram(s) Inhaler 2 Puff(s) Inhalation two times a day  gabapentin 300 milliGRAM(s) Oral three times a day  insulin lispro (ADMELOG) corrective regimen sliding scale   SubCutaneous three times a day before meals  insulin lispro (ADMELOG) corrective regimen sliding scale   SubCutaneous at bedtime  melatonin 3 milliGRAM(s) Oral at bedtime  metoprolol succinate ER 25 milliGRAM(s) Oral daily  pantoprazole    Tablet 40 milliGRAM(s) Oral before breakfast  polyethylene glycol 3350 17 Gram(s) Oral every 12 hours  senna 2 Tablet(s) Oral at bedtime  tiotropium 2.5 MICROgram(s) Inhaler 2 Puff(s) Inhalation daily  torsemide 20 milliGRAM(s) Oral daily      PHYSICAL EXAM:  T(C): 36.4 (05-28-24 @ 16:09), Max: 36.9 (05-28-24 @ 04:31)  HR: 59 (05-28-24 @ 16:09) (59 - 65)  BP: 148/65 (05-28-24 @ 16:09) (122/68 - 173/77)  RR: 18 (05-28-24 @ 16:09) (18 - 18)  SpO2: 92% (05-28-24 @ 16:09) (92% - 98%)  Wt(kg): --  I&O's Summary    27 May 2024 07:01  -  28 May 2024 07:00  --------------------------------------------------------  IN: 640 mL / OUT: 850 mL / NET: -210 mL    28 May 2024 07:01  -  28 May 2024 23:40  --------------------------------------------------------  IN: 240 mL / OUT: 0 mL / NET: 240 mL          Appearance: Normal	  HEENT:  PERRLA   Lymphatic: No lymphadenopathy   Cardiovascular: Normal S1 S2, no JVD  Respiratory: normal effort , clear  Gastrointestinal:  Soft, Non-tender  Skin: No rashes,  warm to touch  Psychiatry:  Mood & affect appropriate  Musculuskeletal: No edema    recent labs, Imaging and EKGs personally reviewed             05-27-24 @ 07:01  -  05-28-24 @ 07:00  --------------------------------------------------------  IN: 640 mL / OUT: 850 mL / NET: -210 mL    05-28-24 @ 07:01  -  05-28-24 @ 23:40  --------------------------------------------------------  IN: 240 mL / OUT: 0 mL / NET: 240 mL

## 2024-05-28 NOTE — PROVIDER CONTACT NOTE (OTHER) - REASON
BP: 184/80
Pts dressing slightly saturated
Emesis Count
Pt had episode of bloody nose
RLE acewrap dsg slightly saturated.

## 2024-05-28 NOTE — DISCHARGE NOTE NURSING/CASE MANAGEMENT/SOCIAL WORK - PATIENT PORTAL LINK FT
You can access the FollowMyHealth Patient Portal offered by [facility] by registering at the following website: [email or web address]. By joining [employer]’s FollowMyHealth portal, you will also be able to view your health information using other applications (apps) compatible with our system.

## 2024-05-28 NOTE — PROGRESS NOTE ADULT - SUBJECTIVE AND OBJECTIVE BOX
Follow-up Pulm Progress Note    No new respiratory events overnight.  Denies SOB/CP.   Sats mid 90s on 2LNC     Medications:  MEDICATIONS  (STANDING):  acetaminophen     Tablet .. 975 milliGRAM(s) Oral every 6 hours  albuterol/ipratropium for Nebulization 3 milliLiter(s) Nebulizer every 6 hours  aMIOdarone    Tablet 200 milliGRAM(s) Oral daily  amLODIPine   Tablet 5 milliGRAM(s) Oral daily  anagrelide 1 milliGRAM(s) Oral <User Schedule>  apixaban 2.5 milliGRAM(s) Oral every 12 hours  atorvastatin 40 milliGRAM(s) Oral at bedtime  budesonide 160 MICROgram(s)/formoterol 4.5 MICROgram(s) Inhaler 2 Puff(s) Inhalation two times a day  gabapentin 300 milliGRAM(s) Oral three times a day  insulin lispro (ADMELOG) corrective regimen sliding scale   SubCutaneous three times a day before meals  insulin lispro (ADMELOG) corrective regimen sliding scale   SubCutaneous at bedtime  melatonin 3 milliGRAM(s) Oral at bedtime  metoprolol succinate ER 25 milliGRAM(s) Oral daily  pantoprazole    Tablet 40 milliGRAM(s) Oral before breakfast  polyethylene glycol 3350 17 Gram(s) Oral every 12 hours  senna 2 Tablet(s) Oral at bedtime  tiotropium 2.5 MICROgram(s) Inhaler 2 Puff(s) Inhalation daily  torsemide 20 milliGRAM(s) Oral daily    MEDICATIONS  (PRN):  oxyCODONE    IR 5 milliGRAM(s) Oral every 4 hours PRN Severe Pain (7 - 10)          Vital Signs Last 24 Hrs  T(C): 36.9 (28 May 2024 08:02), Max: 36.9 (28 May 2024 04:31)  T(F): 98.4 (28 May 2024 08:02), Max: 98.4 (28 May 2024 04:31)  HR: 63 (28 May 2024 08:02) (63 - 67)  BP: 173/77 (28 May 2024 08:02) (134/66 - 184/80)  BP(mean): --  RR: 18 (28 May 2024 04:31) (18 - 18)  SpO2: 96% (28 May 2024 04:31) (95% - 98%)    Parameters below as of 28 May 2024 04:31  Patient On (Oxygen Delivery Method): nasal cannula  O2 Flow (L/min): 2            05-27 @ 07:01  -  05-28 @ 07:00  --------------------------------------------------------  IN: 640 mL / OUT: 850 mL / NET: -210 mL          LABS:                        13.5   12.80 )-----------( 355      ( 28 May 2024 06:39 )             46.2     05-28    142  |  104  |  76<H>  ----------------------------<  77  4.8   |  23  |  1.52<H>    Ca    9.2      28 May 2024 06:39  Phos  3.1     05-28  Mg     2.2     05-28            CAPILLARY BLOOD GLUCOSE      POCT Blood Glucose.: 116 mg/dL (28 May 2024 08:18)      Urinalysis Basic - ( 28 May 2024 06:39 )    Color: x / Appearance: x / SG: x / pH: x  Gluc: 77 mg/dL / Ketone: x  / Bili: x / Urobili: x   Blood: x / Protein: x / Nitrite: x   Leuk Esterase: x / RBC: x / WBC x   Sq Epi: x / Non Sq Epi: x / Bacteria: x              CULTURES:     Culture - Blood (collected 05-21-24 @ 11:01)  Source: .Blood Blood-Peripheral  Final Report (05-26-24 @ 16:00):    No growth at 5 days    Culture - Blood (collected 05-21-24 @ 11:01)  Source: .Blood Blood-Peripheral  Final Report (05-26-24 @ 16:00):    No growth at 5 days                Physical Examination:  PULM: decreased BS, no wheezing   CVS: S1, S2 heard    RADIOLOGY REVIEWED      CT chest:  < from: CT Chest No Cont (05.19.24 @ 12:08) >  FINDINGS:    LUNGS AND AIRWAYS: Patent central airways.  Severe upper lobe predominant   centrilobular emphysema. Bibasilar dependent atelectasis.  PLEURA: Small left pleural effusion.  MEDIASTINUM AND MAIN: Enlarged mediastinal lymph nodes including a   subcarinal lymph node measures 2.5 cm short axis in AP window lymph node   measures up to 1.3 cm short axis.  VESSELS: Coronary artery and aortic atherosclerosis.4 cm pulmonary artery   calcium seen in pulmonary hypertension.  HEART: Heart is enlarged No pericardial effusion. Aortic valve   calcifications.  CHEST WALL AND LOWER NECK: 1 cm left supraclavicular lymph node.  VISUALIZED UPPER ABDOMEN: Similar splenomegaly. Cholelithiasis.  BONES: Within normal limits.    IMPRESSION:  Indeterminate mediastinal and supraclavicular adenopathy    --- End of Report ---    < end of copied text >

## 2024-05-28 NOTE — PROVIDER CONTACT NOTE (OTHER) - ASSESSMENT
Pt A&Ox4, /80 HR 67 stating 98% on 2L NC. No complaints of pain or discomfort. Denies chest pain & SOB.

## 2024-05-28 NOTE — PROVIDER CONTACT NOTE (OTHER) - SITUATION
BP: 184/80
RLE acewrap dsg slightly saturated.
Pt had an emesis episode
Pts dressing slightly saturated
Pt had episode of bloody nose

## 2024-05-28 NOTE — PROGRESS NOTE ADULT - SUBJECTIVE AND OBJECTIVE BOX
VASCULAR SURGERY DAILY PROGRESS NOTE:     SUBJECTIVE/ROS: Pt seen and examined, and is resting comfortably in bed. No acute events overnight. Pain is adequately controlled on current regimen. Pt has no complaints at this time.       MEDICATIONS  (STANDING):  acetaminophen     Tablet .. 975 milliGRAM(s) Oral every 6 hours  albuterol/ipratropium for Nebulization 3 milliLiter(s) Nebulizer every 6 hours  aMIOdarone    Tablet 200 milliGRAM(s) Oral daily  amLODIPine   Tablet 5 milliGRAM(s) Oral daily  anagrelide 1 milliGRAM(s) Oral <User Schedule>  apixaban 2.5 milliGRAM(s) Oral every 12 hours  atorvastatin 40 milliGRAM(s) Oral at bedtime  budesonide 160 MICROgram(s)/formoterol 4.5 MICROgram(s) Inhaler 2 Puff(s) Inhalation two times a day  gabapentin 300 milliGRAM(s) Oral three times a day  insulin lispro (ADMELOG) corrective regimen sliding scale   SubCutaneous three times a day before meals  insulin lispro (ADMELOG) corrective regimen sliding scale   SubCutaneous at bedtime  melatonin 3 milliGRAM(s) Oral at bedtime  metoprolol succinate ER 25 milliGRAM(s) Oral daily  pantoprazole    Tablet 40 milliGRAM(s) Oral before breakfast  polyethylene glycol 3350 17 Gram(s) Oral every 12 hours  senna 2 Tablet(s) Oral at bedtime  tiotropium 2.5 MICROgram(s) Inhaler 2 Puff(s) Inhalation daily  torsemide 20 milliGRAM(s) Oral daily    MEDICATIONS  (PRN):  oxyCODONE    IR 5 milliGRAM(s) Oral every 4 hours PRN Severe Pain (7 - 10)      OBJECTIVE:    Vital Signs Last 24 Hrs  T(C): 36.9 (28 May 2024 08:02), Max: 36.9 (28 May 2024 04:31)  T(F): 98.4 (28 May 2024 08:02), Max: 98.4 (28 May 2024 04:31)  HR: 63 (28 May 2024 08:02) (63 - 67)  BP: 173/77 (28 May 2024 08:02) (134/66 - 184/80)  BP(mean): --  RR: 18 (28 May 2024 04:31) (18 - 18)  SpO2: 96% (28 May 2024 04:31) (95% - 98%)    Parameters below as of 28 May 2024 04:31  Patient On (Oxygen Delivery Method): nasal cannula  O2 Flow (L/min): 2          I&O's Detail    27 May 2024 07:01  -  28 May 2024 07:00  --------------------------------------------------------  IN:    Oral Fluid: 640 mL  Total IN: 640 mL    OUT:    Voided (mL): 850 mL  Total OUT: 850 mL    Total NET: -210 mL          Daily     Daily     LABS:                        13.5   12.80 )-----------( 355      ( 28 May 2024 06:39 )             46.2     05-28    142  |  104  |  76<H>  ----------------------------<  77  4.8   |  23  |  1.52<H>    Ca    9.2      28 May 2024 06:39  Phos  3.1     05-28  Mg     2.2     05-28        Urinalysis Basic - ( 28 May 2024 06:39 )    Color: x / Appearance: x / SG: x / pH: x  Gluc: 77 mg/dL / Ketone: x  / Bili: x / Urobili: x   Blood: x / Protein: x / Nitrite: x   Leuk Esterase: x / RBC: x / WBC x   Sq Epi: x / Non Sq Epi: x / Bacteria: x                PHYSICAL EXAM:  GEN: NAD  HEENT: atraumatic, normocephalic  CV: pulse present regularly  RESP: no increased work of breathing, no use of accessory muscles  GI/ABD: soft, nontender, nondistended  EXTREMITIES: Right AKA ACE dressing c/d/i. Warm to touch, soft. No cyanosis/erythema/edema/hematoma.

## 2024-05-28 NOTE — PROGRESS NOTE ADULT - PROBLEM SELECTOR PROBLEM 2
COPD (chronic obstructive pulmonary disease)
PAD (peripheral artery disease)

## 2024-05-28 NOTE — PROGRESS NOTE ADULT - PROBLEM SELECTOR PLAN 1
unclear cause   -Pt seen on 4LNC with sats in 60s on 4/21, placed on 100% NRB  -ABG drawn on 100% NRB, PaO2 74, no significant CO2 retention   -Pt with no respiratory symptoms other than a cough, no chest pain  -Pt already on AC with heparin gtt, lower concern for PE   -Pts underlying emphysema possibly playing a role, although hypoxia worsened very quickly.   -Continue solumedrol 20mg IVP q8h for now   -Consider eventual TTE with bubble study  -Keep sats >88% with o2
unclear cause at this time  -Pt seen on 4LNC with sats in 60s, placed on 100% NRB  -ABG drawn on 100% NRB, PaO2 74, no significant CO2 retention   -Pt with no respiratory symptoms other than a cough, no chest pain  -Although pt already on AC with heparin gtt, suggest CTA chest to r/o PE  -Urgent CXR ordered  -Temp 100.7 F rectally, suggest start empiric abx  -Pts underlying emphysema possibly playing a role, although hypoxia worsened very quickly. Suggest solumedrol 125mg IVP x1, then solumedrol 20mg IVP q8h for now.   -Flu/COVID swab ordered (will add on full RVP once received in lab)   -Can transition from NRB to HFNC. Can start at 60L/100% and wean Down FiO2 as able, keep sats >88%.
unclear cause   -Pt seen on 4LNC with sats in 60s on 4/21, placed on 100% NRB  -ABG drawn on 100% NRB, PaO2 74, no significant CO2 retention   -Pt with no respiratory symptoms at the time of desaturation other than a cough, no chest pain  -Pt already on AC with heparin gtt at the time, lower concern for PE   -Pts underlying emphysema possibly playing a role, although hypoxia worsened very quickly.   -Consider eventual TTE with bubble study  -S/p IV solumedrol, can continue steroids as ordered   -Keep sats >88% with o2  -Pt c/o congested cough, suggest Mucinex 1200mg PO BID x 5 days
unclear cause   -Pt seen on 4LNC with sats in 60s on 4/21, placed on 100% NRB  -ABG drawn on 100% NRB, PaO2 74, no significant CO2 retention   -Pt with no respiratory symptoms at the time of desaturation other than a cough, no chest pain  -Pt already on AC with heparin gtt at the time, lower concern for PE   -Pts underlying emphysema possibly playing a role, although hypoxia worsened very quickly.   -Consider eventual TTE with bubble study  -S/p IV solumedrol, s/p short course of prednisone    -Back at baseline o2 requirements (2LNC). Keep sats >88% with o2.
-On home O2 baseline 2LNC, currently on 3LNC with o2 sats low 90s  -Continue Duoneb q6h  -Continue Symbicort 160/4.5 mcg 2 puffs BID  -Continue Spiriva  -Keep sats >88% with O2  -Reports PET/CT done 1 month ago due to enlarged LN (reportedly unchanged compared to prior). CT chest now with subcarinal LN measuring 2.5 cm and 1 cm L supraclavicular LN. Suggest close outpatient f/u with her pulmonologist.   -VBG (appears arterial) from 5/19 with mild CO2 retention

## 2024-05-28 NOTE — PROGRESS NOTE ADULT - NUTRITIONAL ASSESSMENT
This patient has been assessed with a concern for Malnutrition and has been determined to have a diagnosis/diagnoses of Severe protein-calorie malnutrition.    This patient is being managed with:   Diet Regular-  DASH/TLC {Sodium & Cholesterol Restricted} (DASH)  Entered: May 16 2024  9:32PM  
This patient has been assessed with a concern for Malnutrition and has been determined to have a diagnosis/diagnoses of Severe protein-calorie malnutrition.    This patient is being managed with:   Diet Regular-  DASH/TLC {Sodium & Cholesterol Restricted} (DASH)  Supplement Feeding Modality:  Oral  Ensure Max Cans or Servings Per Day:  1       Frequency:  Daily  Entered: May 23 2024  1:17PM  
This patient has been assessed with a concern for Malnutrition and has been determined to have a diagnosis/diagnoses of Severe protein-calorie malnutrition.    This patient is being managed with:   Diet Regular-  DASH/TLC {Sodium & Cholesterol Restricted} (DASH)  Entered: May 16 2024  9:32PM  
This patient has been assessed with a concern for Malnutrition and has been determined to have a diagnosis/diagnoses of Severe protein-calorie malnutrition.    This patient is being managed with:   Diet Regular-  DASH/TLC {Sodium & Cholesterol Restricted} (DASH)  Supplement Feeding Modality:  Oral  Ensure Max Cans or Servings Per Day:  1       Frequency:  Daily  Entered: May 23 2024  1:17PM  
This patient has been assessed with a concern for Malnutrition and has been determined to have a diagnosis/diagnoses of Severe protein-calorie malnutrition.    This patient is being managed with:   Diet Regular-  DASH/TLC {Sodium & Cholesterol Restricted} (DASH)  Supplement Feeding Modality:  Oral  Ensure Max Cans or Servings Per Day:  1       Frequency:  Daily  Entered: May 23 2024  1:17PM

## 2024-05-28 NOTE — PROVIDER CONTACT NOTE (OTHER) - ACTION/TREATMENT ORDERED:
Provider notified. Reassess vitals within an hour.
Provider notified.
Zofran ordered. Per MD, pt ok to receive PRN oxy 10mg at this time d/t possible reason for breakthrough PRN Dilaudid to cause further s/s of nausea and vomiting.
bloody nose self resolved, pt placed on humidified o2, and assessed for further s/s of bleeding. no action ordered
Yaritza Bolden MD made aware continue to monitor dressing and reinforce if necessary. Safety precautions monitored. Hourly rounding in progress.

## 2024-05-28 NOTE — PROGRESS NOTE ADULT - PROBLEM SELECTOR PROBLEM 1
Acute respiratory failure with hypoxia
Acute respiratory failure with hypoxia
COPD (chronic obstructive pulmonary disease)
Acute respiratory failure with hypoxia
Acute respiratory failure with hypoxia

## 2024-05-28 NOTE — DISCHARGE NOTE PROVIDER - HOSPITAL COURSE
82yo W pmhx of prior RLE bypass graft presenting with Zac 2 acute limb ischemia. Patient admitted to vascular surgery Dr. Sarah. Patient started on heparin gtt. Pre-Operative Cardiac Risk Stratification and Optimization. Based on patient history and physical exam, the patient is considered to have high risk on optimal meds. recent cath jan 2024 without need for intervention. echo shows normal LV /RV function. appreciate pulm input. can proceed to this time sensitive surgery with acceptable elevated risk.     On 5/21 patient was taken to the operating room and underwent right AKA. Patient was taken to the SICU post -operatively. Doxycycline started for COPD exacerbation. Physical therapy worked with patient and recommended acute rehab. On 3/25 patient was transferred from SICU to floor.  5/28 patient was accepted to acute rehab. On day of discharge, the patient was tolerating diet, ambulating with assistance and pain controlled.

## 2024-05-28 NOTE — DISCHARGE NOTE NURSING/CASE MANAGEMENT/SOCIAL WORK - NSDCPEFALRISK_GEN_ALL_CORE
For information on Fall & Injury Prevention, visit: [email or web address] OR  [email or web address] OR  https://www.cdc.gov/steadi/patient.html

## 2024-05-28 NOTE — DISCHARGE NOTE PROVIDER - DETAILS OF MALNUTRITION DIAGNOSIS/DIAGNOSES
normal gait and station , no tenderness or deformities present This patient has been assessed with a concern for Malnutrition and was treated during this hospitalization for the following Nutrition diagnosis/diagnoses:     -  05/22/2024: Severe protein-calorie malnutrition

## 2024-05-28 NOTE — PROGRESS NOTE ADULT - PROBLEM SELECTOR PLAN 2
-Plans for AKA today. Pt at higher risk for complications due to underlying lung disease. No absolute contraindications to planned procedure. Suggest continue bronchodilators, post op bipap if needed.   -Management per vascular.
-On home O2 baseline 2LNC, currently on 3LNC with o2 sats low 90s  -Continue Duoneb q6h  -Continue Symbicort 160/4.5 mcg 2 puffs BID  -Continue Spiriva  -Keep sats >88% with O2  -Reports PET/CT done 1 month ago due to enlarged LN (reportedly unchanged compared to prior). CT chest now with subcarinal LN measuring 2.5 cm and 1 cm L supraclavicular LN. Suggest close outpatient f/u with her pulmonologist.
-On home O2 2LNC  -Continue Duoneb q6h  -Continue Symbicort 160/4.5 mcg 2 puffs BID  -Continue Spiriva  -Keep sats >88% with O2  -Reports PET/CT done 1 month ago due to enlarged LN (reportedly unchanged compared to prior). CT chest now with subcarinal LN measuring 2.5 cm and 1 cm L supraclavicular LN. Suggest close outpatient f/u with her pulmonologist.
-On home O2 baseline 2LNC, currently on 3LNC with o2 sats low 90s  -Continue Duoneb q6h  -Continue Symbicort 160/4.5 mcg 2 puffs BID  -Continue Spiriva  -Keep sats >88% with O2  -Reports PET/CT done 1 month ago due to enlarged LN (reportedly unchanged compared to prior). CT chest now with subcarinal LN measuring 2.5 cm and 1 cm L supraclavicular LN. Suggest close outpatient f/u with her pulmonologist.
-On home O2 2LNC  -Continue Duoneb q6h, change to q6h PRN on discharge   -Continue Symbicort 160/4.5 mcg 2 puffs BID  -Continue Spiriva  -Keep sats >88% with O2  -Reports PET/CT done 1 month ago due to enlarged LN (reportedly unchanged compared to prior). CT chest now with subcarinal LN measuring 2.5 cm and 1 cm L supraclavicular LN. Suggest close outpatient f/u with her pulmonologist, consider biopsy as an outpatient.

## 2024-05-28 NOTE — PROGRESS NOTE ADULT - SUBJECTIVE AND OBJECTIVE BOX
Subjective: Patient seen and examined. No new events except as noted.     SUBJECTIVE/ROS:  nad      MEDICATIONS:  MEDICATIONS  (STANDING):  acetaminophen     Tablet .. 975 milliGRAM(s) Oral every 6 hours  albuterol/ipratropium for Nebulization 3 milliLiter(s) Nebulizer every 6 hours  aMIOdarone    Tablet 200 milliGRAM(s) Oral daily  amLODIPine   Tablet 5 milliGRAM(s) Oral daily  anagrelide 1 milliGRAM(s) Oral <User Schedule>  apixaban 2.5 milliGRAM(s) Oral every 12 hours  atorvastatin 40 milliGRAM(s) Oral at bedtime  budesonide 160 MICROgram(s)/formoterol 4.5 MICROgram(s) Inhaler 2 Puff(s) Inhalation two times a day  gabapentin 300 milliGRAM(s) Oral three times a day  insulin lispro (ADMELOG) corrective regimen sliding scale   SubCutaneous three times a day before meals  insulin lispro (ADMELOG) corrective regimen sliding scale   SubCutaneous at bedtime  melatonin 3 milliGRAM(s) Oral at bedtime  metoprolol succinate ER 25 milliGRAM(s) Oral daily  pantoprazole    Tablet 40 milliGRAM(s) Oral before breakfast  polyethylene glycol 3350 17 Gram(s) Oral every 12 hours  senna 2 Tablet(s) Oral at bedtime  tiotropium 2.5 MICROgram(s) Inhaler 2 Puff(s) Inhalation daily  torsemide 20 milliGRAM(s) Oral daily      PHYSICAL EXAM:  T(C): 36.9 (05-28-24 @ 04:31), Max: 36.9 (05-28-24 @ 04:31)  HR: 65 (05-28-24 @ 04:31) (63 - 67)  BP: 170/68 (05-28-24 @ 04:31) (134/66 - 184/80)  RR: 18 (05-28-24 @ 04:31) (18 - 18)  SpO2: 96% (05-28-24 @ 04:31) (92% - 98%)  Wt(kg): --  I&O's Summary    27 May 2024 07:01  -  28 May 2024 07:00  --------------------------------------------------------  IN: 640 mL / OUT: 850 mL / NET: -210 mL            JVP: Normal  Neck: supple  Lung: clear   CV: S1 S2 , Murmur:  Abd: soft  Ext: No edema  neuro: Awake / alert  Psych: flat affect  Skin: normal``    LABS/DATA:    CARDIAC MARKERS:                                13.5   12.80 )-----------( 355      ( 28 May 2024 06:39 )             46.2     05-28    142  |  104  |  76<H>  ----------------------------<  77  4.8   |  23  |  1.52<H>    Ca    9.2      28 May 2024 06:39  Phos  3.1     05-28  Mg     2.2     05-28      proBNP:   Lipid Profile:   HgA1c:   TSH:     TELE:  EKG:

## 2024-05-28 NOTE — DISCHARGE NOTE PROVIDER - CARE PROVIDER_API CALL
Bannazadeh, Mohsen  Vascular Surgery  [address] [address]  Phone: [phone]  Fax: [phone]  Follow Up Time: 2 weeks

## 2024-05-30 LAB — SURGICAL PATHOLOGY STUDY: SIGNIFICANT CHANGE UP

## 2024-06-10 RX ORDER — APIXABAN 2.5 MG/1
1 TABLET, FILM COATED ORAL
Refills: 0 | DISCHARGE

## 2024-06-10 RX ORDER — ALBUTEROL 90 UG/1
2 AEROSOL, METERED ORAL
Refills: 0 | DISCHARGE

## 2024-06-10 RX ORDER — AMIODARONE HYDROCHLORIDE 400 MG/1
1 TABLET ORAL
Refills: 0 | DISCHARGE

## 2024-06-10 RX ORDER — AMLODIPINE BESYLATE 2.5 MG/1
1 TABLET ORAL
Refills: 0 | DISCHARGE

## 2024-06-10 RX ORDER — ATORVASTATIN CALCIUM 80 MG/1
1 TABLET, FILM COATED ORAL
Refills: 0 | DISCHARGE

## 2024-06-10 RX ORDER — ANAGRELIDE HCL 0.5 MG
1 CAPSULE ORAL
Refills: 0 | DISCHARGE

## 2024-06-10 RX ORDER — METOPROLOL TARTRATE 50 MG
1 TABLET ORAL
Refills: 0 | DISCHARGE

## 2024-06-10 RX ORDER — UMECLIDINIUM BROMIDE AND VILANTEROL TRIFENATATE 62.5; 25 UG/1; UG/1
1 POWDER RESPIRATORY (INHALATION)
Refills: 0 | DISCHARGE

## 2024-06-10 RX ORDER — CLOPIDOGREL BISULFATE 75 MG/1
1 TABLET, FILM COATED ORAL
Refills: 0 | DISCHARGE

## 2024-06-11 ENCOUNTER — APPOINTMENT (OUTPATIENT)
Dept: VASCULAR SURGERY | Facility: CLINIC | Age: 84
End: 2024-06-11

## 2024-06-11 DIAGNOSIS — S78.119A COMPLETE TRAUMATIC AMPUTATION AT LVL BETWEEN UNSPECIFIED HIP AND KNEE, INITIAL ENCOUNTER: ICD-10-CM

## 2024-07-18 ENCOUNTER — APPOINTMENT (OUTPATIENT)
Dept: VASCULAR SURGERY | Facility: CLINIC | Age: 84
End: 2024-07-18

## 2024-11-14 ENCOUNTER — APPOINTMENT (OUTPATIENT)
Dept: OPHTHALMOLOGY | Facility: CLINIC | Age: 84
End: 2024-11-14

## 2024-12-05 ENCOUNTER — APPOINTMENT (OUTPATIENT)
Dept: OPHTHALMOLOGY | Facility: CLINIC | Age: 84
End: 2024-12-05

## (undated) DEVICE — VENODYNE/SCD SLEEVE CALF MEDIUM

## (undated) DEVICE — PACK EXTREMITY

## (undated) DEVICE — GOWN TRIMAX LG

## (undated) DEVICE — SUT SOFSILK 3-0 30" V-20

## (undated) DEVICE — WARMING BLANKET UPPER ADULT

## (undated) DEVICE — SUT SURGIPRO 2-0 18" C-15

## (undated) DEVICE — DRSG TELFA 3 X 8

## (undated) DEVICE — DRAPE 3/4 SHEET W REINFORCEMENT 56X77"

## (undated) DEVICE — DRSG STOCKINETTE IMPERVIOUS XL

## (undated) DEVICE — STAPLER SKIN VISI-STAT 35 WIDE

## (undated) DEVICE — SUT POLYSORB 4-0 18" TIES UNDYED

## (undated) DEVICE — SPECIMEN CONTAINER 100ML

## (undated) DEVICE — LAP PAD 18 X 18"

## (undated) DEVICE — BLADE SCALPEL SAFETYLOCK #10

## (undated) DEVICE — PREP CHLORAPREP HI-LITE ORANGE 26ML

## (undated) DEVICE — POSITIONER CARDIAC BUMP

## (undated) DEVICE — SYR ASEPTO

## (undated) DEVICE — DRAPE 1/2 SHEET 40X57"

## (undated) DEVICE — DRAPE MAGNETIC INSTRUMENT MEDIUM

## (undated) DEVICE — FOLEY TRAY 16FR 5CC LTX UMETER CLOSED

## (undated) DEVICE — SOL IRR POUR H2O 250ML

## (undated) DEVICE — DRSG STERISTRIPS 0.5 X 4"

## (undated) DEVICE — MEDICATION LABELS W MARKER

## (undated) DEVICE — DRSG XEROFORM 1 X 8"

## (undated) DEVICE — FOLEY HOLDER STATLOCK 2 WAY ADULT

## (undated) DEVICE — BLADE SCALPEL SAFETYLOCK #15

## (undated) DEVICE — DRAPE MAYO STAND 30"

## (undated) DEVICE — SUT POLYSORB 2-0 18" TIES UNDYED

## (undated) DEVICE — DRAPE ISOLATION BAG 20X20"

## (undated) DEVICE — STRYKER INTERPULSE HANDPIECE W IRR SUCTION TUBE

## (undated) DEVICE — DRSG KLING 6"

## (undated) DEVICE — DRAPE TOWEL BLUE 17" X 24"

## (undated) DEVICE — SOL IRR POUR NS 0.9% 500ML

## (undated) DEVICE — DRAPE INSTRUMENT POUCH 6.75" X 11"

## (undated) DEVICE — DRSG COMBINE 5X9"

## (undated) DEVICE — DRSG CURITY GAUZE SPONGE 4 X 4" 12-PLY

## (undated) DEVICE — SUT POLYSORB 2-0 30" GS-21 UNDYED

## (undated) DEVICE — MARKING PEN W RULER

## (undated) DEVICE — GLV 8 PROTEXIS (WHITE)

## (undated) DEVICE — DRSG ACE BANDAGE 6"

## (undated) DEVICE — DRAPE LIGHT HANDLE COVER (BLUE)

## (undated) DEVICE — DRSG TEGADERM 6"X8"

## (undated) DEVICE — SUT POLYSORB 3-0 30" V-20 UNDYED

## (undated) DEVICE — SUCTION YANKAUER NO CONTROL VENT

## (undated) DEVICE — GLV 7.5 PROTEXIS (WHITE)

## (undated) DEVICE — DRSG OPSITE 13.75 X 4"

## (undated) DEVICE — SUT MONOSOF 2-0 18" C-15

## (undated) DEVICE — POSITIONER FOAM EGG CRATE ULNAR 2PCS (PINK)

## (undated) DEVICE — SUT SOFSILK 2-0 30" V-20

## (undated) DEVICE — SAW BLADE MICROAIRE OSCILATING 25.4MM X 90MM X 1.27MM